# Patient Record
Sex: MALE | Race: WHITE | NOT HISPANIC OR LATINO | Employment: OTHER | URBAN - METROPOLITAN AREA
[De-identification: names, ages, dates, MRNs, and addresses within clinical notes are randomized per-mention and may not be internally consistent; named-entity substitution may affect disease eponyms.]

---

## 2017-02-09 ENCOUNTER — HOSPITAL ENCOUNTER (EMERGENCY)
Facility: HOSPITAL | Age: 37
Discharge: HOME | End: 2017-02-09
Attending: EMERGENCY MEDICINE

## 2017-02-09 VITALS
TEMPERATURE: 98 F | WEIGHT: 165 LBS | BODY MASS INDEX: 25.01 KG/M2 | HEIGHT: 68 IN | HEART RATE: 90 BPM | SYSTOLIC BLOOD PRESSURE: 164 MMHG | RESPIRATION RATE: 18 BRPM | OXYGEN SATURATION: 97 % | DIASTOLIC BLOOD PRESSURE: 94 MMHG

## 2017-02-09 DIAGNOSIS — M79.609 STUMP PAIN: Primary | ICD-10-CM

## 2017-02-09 DIAGNOSIS — T87.89 STUMP PAIN: Primary | ICD-10-CM

## 2017-02-09 PROCEDURE — 99283 EMERGENCY DEPT VISIT LOW MDM: CPT

## 2017-02-09 RX ORDER — HYDROCODONE BITARTRATE AND ACETAMINOPHEN 5; 325 MG/1; MG/1
1 TABLET ORAL EVERY 4 HOURS PRN
Qty: 18 TABLET | Refills: 0 | Status: SHIPPED | OUTPATIENT
Start: 2017-02-09 | End: 2017-09-03

## 2017-02-09 NOTE — ED AVS SNAPSHOT
[facility]  [address]  [place] [address]               [place]   2017  1:22 AM   ED    Description:  Male : 1980   Department:  [facility]           Your Care was Coordinated By:     Provider Role From To    Rashmi Saldaña MD Attending Provider 17 0129 --      Reason for Visit     Neck Pain     Leg Pain           Diagnoses this Visit        Comments    Stump pain    -  Primary       ED Disposition     None           To Do List           Follow-up Information     Follow up with Donald Vega MD In 2 days.    Specialty:  Family Medicine    Why:  For further care    Contact information:    [address]  [place] [address]  [phone]         These Medications        Disp Refills Start End    hydrocodone-acetaminophen 5-325mg (NORCO) 5-325 mg per tablet 18 tablet 0 2017     Take 1 tablet by mouth every 4 (four) hours as needed for Pain. - Oral    Pharmacy: [facility]/pharmacy #[id number] - [place] - [address] [facility] Ph #: [phone]         [facility]     [facility] -  Assistance  Registered nurses in the [facility] provide clinical advisement, health education, appointment booking, and other advisory services.  Call for this free service at [phone].             Medications           Message regarding Medications     Verify the changes and/or additions to your medication regime listed below are the same as discussed with your clinician today.  If any of these changes or additions are incorrect, please notify your healthcare provider.        START taking these NEW medications        Refills    hydrocodone-acetaminophen 5-325mg (NORCO) 5-325 mg per tablet 0    Sig: Take 1 tablet by mouth every 4 (four) hours as needed for Pain.    Class: Print    Route: Oral           Verify that the below list of medications is an accurate  "representation of the medications you are currently taking.  If none reported, the list may be blank. If incorrect, please contact your healthcare provider. Carry this list with you in case of emergency.           Current Medications     alprazolam (XANAX) 0.5 MG tablet Take 0.5 mg by mouth 3 (three) times daily as needed for Anxiety.     duloxetine (CYMBALTA) 60 MG capsule Take 60 mg by mouth once daily.    gabapentin (NEURONTIN) 300 MG capsule Take 2 capsules (600 mg total) by mouth 3 (three) times daily. Take one tablet by mouth 3 to 4 times daily    ibuprofen (ADVIL,MOTRIN) 200 MG tablet Take 2 tablets (400 mg total) by mouth every 6 (six) hours as needed for Pain.    INV LISINOPRIL-HCTZ 20-12.5 Take 1 tablet by mouth once daily. For investigational use only.    acetaminophen (TYLENOL) 500 MG tablet Take 2 tablets (1,000 mg total) by mouth 3 (three) times daily as needed for Pain.    amlodipine (NORVASC) 10 MG tablet Take 1 tablet (10 mg total) by mouth once daily.    hydrocodone-acetaminophen 5-325mg (NORCO) 5-325 mg per tablet Take 1 tablet by mouth every 4 (four) hours as needed for Pain.    naproxen (NAPROSYN) 500 MG tablet Take 500 mg by mouth 2 (two) times daily.    oxycodone-acetaminophen (PERCOCET) 5-325 mg per tablet Take 1 tablet by mouth every 6 (six) hours as needed for Pain.           Clinical Reference Information           Your Vitals Were     BP Pulse Temp Resp Height Weight    161/111 (BP Location: Right arm, Patient Position: Sitting) 94 98 °F (36.7 °C) (Oral) 20 5' 8" (1.727 m) 74.8 kg (165 lb)    SpO2 BMI             100% 25.09 kg/m2         Allergies as of 2/9/2017     No Known Allergies      Immunizations Administered on Date of Encounter - 2/9/2017     None      ED Micro, Lab, POCT     None      ED Imaging Orders     None      Discharge References/Attachments     STUMP WRAPPING, BELOW THE KNEE, DISCHARGE INSTRUCTIONS (ENGLISH)      [facility] Sign-Up     Activating your [facility] account is " as easy as 1-2-3!     1) Visit [email or web address], select Sign Up Now, enter this activation code and your date of birth, then select Next.  [email or web address]  Expires: 3/26/2017  2:12 AM      2) Create a username and password to use when you visit [facility] in the future and select a security question in case you lose your password and select Next.    3) Enter your e-mail address and click Sign Up!    Additional Information  If you have questions, please e-mail [email or web address] or call [phone] to talk to our [employer] staff. Remember, [facility] is NOT to be used for urgent needs. For medical emergencies, dial 911.         Smoking Cessation     If you would like to quit smoking:   You may be eligible for free services if you are a [place] resident and started smoking cigarettes before September 1, 1988.  Call the Smoking Cessation Trust ([facility]) toll free at (599) 999-6689 or ([phone] [phone].   Call 3-074-QUIT-NOW if you do not meet the above criteria.             [facility] complies with applicable Federal civil rights laws and does not discriminate on the basis of race, color, national origin, age, disability, or sex.        Language Assistance Services     ATTENTION: Language assistance services are available, free of charge. Please call [phone].      ATENCIÓN: Si habla español, tiene a aly disposición servicios gratuitos de asistencia lingüística. Llame al [phone].     CHÚ Ý: N?u b?n nói Ti?ng Vi?t, có các d?ch v? h? tr? ngôn ng? mi?n phí dành cho b?n. G?i s? [phone].

## 2017-09-03 ENCOUNTER — HOSPITAL ENCOUNTER (EMERGENCY)
Facility: HOSPITAL | Age: 37
Discharge: HOME | End: 2017-09-03
Attending: FAMILY MEDICINE

## 2017-09-03 VITALS
DIASTOLIC BLOOD PRESSURE: 108 MMHG | TEMPERATURE: 98 F | SYSTOLIC BLOOD PRESSURE: 184 MMHG | HEIGHT: 68 IN | OXYGEN SATURATION: 97 % | WEIGHT: 188 LBS | HEART RATE: 90 BPM | RESPIRATION RATE: 18 BRPM | BODY MASS INDEX: 28.49 KG/M2

## 2017-09-03 DIAGNOSIS — M79.602 ARM PAIN, ANTERIOR, LEFT: ICD-10-CM

## 2017-09-03 DIAGNOSIS — M79.622 LEFT UPPER ARM PAIN: Primary | ICD-10-CM

## 2017-09-03 DIAGNOSIS — S46.912A MUSCLE STRAIN OF LEFT UPPER ARM, INITIAL ENCOUNTER: ICD-10-CM

## 2017-09-03 PROCEDURE — 99283 EMERGENCY DEPT VISIT LOW MDM: CPT

## 2017-09-03 PROCEDURE — 25000003 PHARM REV CODE 250: Performed by: FAMILY MEDICINE

## 2017-09-03 RX ORDER — HYDROCODONE BITARTRATE AND ACETAMINOPHEN 5; 325 MG/1; MG/1
1 TABLET ORAL EVERY 8 HOURS PRN
Qty: 10 TABLET | Refills: 0 | Status: ON HOLD | OUTPATIENT
Start: 2017-09-03 | End: 2020-02-21 | Stop reason: CLARIF

## 2017-09-03 RX ORDER — ATORVASTATIN CALCIUM 40 MG/1
40 TABLET, FILM COATED ORAL DAILY
Status: ON HOLD | COMMUNITY
End: 2020-02-27 | Stop reason: HOSPADM

## 2017-09-03 RX ORDER — KETOROLAC TROMETHAMINE 10 MG/1
10 TABLET, FILM COATED ORAL
Status: COMPLETED | OUTPATIENT
Start: 2017-09-03 | End: 2017-09-03

## 2017-09-03 RX ADMIN — KETOROLAC TROMETHAMINE 10 MG: 10 TABLET, FILM COATED ORAL at 11:09

## 2017-09-04 NOTE — ED NOTES
1st encounter, patient ambulatory to room with steady gait.  C/O left arm pain 7.5/10 after near fall down stairs this afternoon.  Patient states he tripped on stairs and reached out to catch self, stretching left arm; also tripped and fell two weeks ago landing on left arm on carpet.  Since this afternoon patient states he is unable to straighten left arm and feels like bicep muscle is really tight, unable to completely close fist.  (+) distal pulses and cap refill, has scar from laceration to left bicep from fall two weeks ago, states he is not sure what he cut it on but it did not require sutures.  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

## 2017-09-04 NOTE — ED PROVIDER NOTES
Encounter Date: 9/3/2017       History     Chief Complaint   Patient presents with    Arm Pain     Two weeks ago lost balance on crutches and fell on left arm. Tonight dog dropped a ball under foot and he fell. He dropped to grab something with left arm to prevent fall and aggrevated the arm     Patient says he injured his left arm while slipped and fell when his dog ran.  And also he had injury to his left arm today.  Complains of pain in the biceps region.  Patient has a right BKA and uses crutches.  Patient took OTC pain medication at home which did not make him any better.  Patient is able to move his arm but with some pain.  Patient also complains of mild pain in his left shoulder area.  Mild bruising on his skin that nothing deep.  Patient has breath of alcohol and when I asked him if he had any drinks initially denied but in and I told them that I can feel the smells and he admitted to drinking one beer about 4 hours ago but smell looks much stronger than what he says.      The history is provided by the patient.     Review of patient's allergies indicates:  No Known Allergies  Past Medical History:   Diagnosis Date    Amputation of lower limb     left below the knee    Compartment syndrome of left lower extremity     Compartment syndrome of right lower extremity     Depression 3/18/2016    DVT (deep venous thrombosis)     Encounter for cholesteral screening for cardiovascular disease     Hypertension     Tachycardia      Past Surgical History:   Procedure Laterality Date    AMPUTATION  2012    right BKA    KNEE SURGERY      TIBIAL FASCIOTOMY Right 12/08/2015     No family history on file.  Social History   Substance Use Topics    Smoking status: Current Every Day Smoker     Packs/day: 1.00     Types: Cigarettes    Smokeless tobacco: Never Used    Alcohol use Yes      Comment: occas     Review of Systems   Constitutional: Negative for appetite change, chills and fever.   HENT: Negative for  congestion, ear pain and sore throat.    Eyes: Negative for pain, discharge, redness and itching.   Respiratory: Negative for cough, choking, shortness of breath and wheezing.    Cardiovascular: Negative for chest pain, palpitations and leg swelling.   Gastrointestinal: Negative for abdominal distention, abdominal pain and diarrhea.   Genitourinary: Negative for dysuria and frequency.   Musculoskeletal: Negative for back pain, neck pain and neck stiffness.   Skin: Negative for rash.   Neurological: Negative for dizziness, tremors, speech difficulty, weakness, light-headedness and headaches.   Psychiatric/Behavioral: The patient is not nervous/anxious.    All other systems reviewed and are negative.      Physical Exam     Initial Vitals [09/03/17 2107]   BP Pulse Resp Temp SpO2   (!) 188/108 96 18 98.4 °F (36.9 °C) 97 %      MAP       134.67         Physical Exam    Nursing note and vitals reviewed.  Constitutional: He appears well-developed and well-nourished.   HENT:   Head: Normocephalic and atraumatic.   Right Ear: External ear normal.   Left Ear: External ear normal.   Nose: Nose normal.   Mouth/Throat: Oropharynx is clear and moist.   Eyes: Conjunctivae and EOM are normal. Pupils are equal, round, and reactive to light.   Neck: Normal range of motion. Neck supple.   Cardiovascular: Normal rate, regular rhythm, normal heart sounds and intact distal pulses. Exam reveals no friction rub.    No murmur heard.  Pulmonary/Chest: Breath sounds normal. No respiratory distress. He has no wheezes. He has no rhonchi. He has no rales.   Abdominal: Soft. Bowel sounds are normal. He exhibits no distension. There is no tenderness. There is no guarding.   Musculoskeletal: Normal range of motion.        Left shoulder: He exhibits tenderness and pain.        Left elbow: He exhibits normal range of motion. Tenderness found.   Most of the tenderness is localized to the biceps muscle and tendon insertion area.  Mild tenderness in  the left shoulder area.   Neurological: He is alert and oriented to person, place, and time. He has normal reflexes. He displays normal reflexes. No cranial nerve deficit or sensory deficit.   Skin: Skin is warm. Capillary refill takes less than 2 seconds.   Psychiatric: He has a normal mood and affect. Thought content normal.         ED Course   Procedures  Labs Reviewed - No data to display          Medical Decision Making:   Differential Diagnosis:   Biceps tendon strain.  Humerus fracture.  Muscle strain.                   ED Course      Clinical Impression:   The primary encounter diagnosis was Left upper arm pain. Diagnoses of Arm pain, anterior, left and Muscle strain of left upper arm, initial encounter were also pertinent to this visit.    Disposition:   Disposition: Discharged  Condition: Fair  Advised to follow PCP if pain persist.                        Alphonso Zhao MD  09/04/17 0602

## 2017-09-18 ENCOUNTER — HOSPITAL ENCOUNTER (EMERGENCY)
Facility: HOSPITAL | Age: 37
Discharge: HOME | End: 2017-09-18
Attending: EMERGENCY MEDICINE

## 2017-09-18 VITALS
HEART RATE: 88 BPM | HEIGHT: 69 IN | TEMPERATURE: 98 F | WEIGHT: 190 LBS | SYSTOLIC BLOOD PRESSURE: 206 MMHG | RESPIRATION RATE: 17 BRPM | DIASTOLIC BLOOD PRESSURE: 116 MMHG | BODY MASS INDEX: 28.14 KG/M2 | OXYGEN SATURATION: 98 %

## 2017-09-18 DIAGNOSIS — F19.90 MISUSE OF PRESCRIPTION ONLY DRUGS: Primary | ICD-10-CM

## 2017-09-18 DIAGNOSIS — M25.569 KNEE PAIN, ACUTE: ICD-10-CM

## 2017-09-18 PROCEDURE — 99284 EMERGENCY DEPT VISIT MOD MDM: CPT

## 2017-09-18 RX ORDER — TRAMADOL HYDROCHLORIDE 50 MG/1
50 TABLET ORAL
Status: DISCONTINUED | OUTPATIENT
Start: 2017-09-18 | End: 2017-09-18 | Stop reason: HOSPADM

## 2017-09-18 RX ORDER — TRAMADOL HYDROCHLORIDE 50 MG/1
50 TABLET ORAL EVERY 6 HOURS PRN
Qty: 8 TABLET | Refills: 0 | Status: SHIPPED | OUTPATIENT
Start: 2017-09-18 | End: 2018-01-17

## 2018-01-17 ENCOUNTER — HOSPITAL ENCOUNTER (EMERGENCY)
Facility: HOSPITAL | Age: 38
Discharge: HOME | End: 2018-01-17
Attending: FAMILY MEDICINE

## 2018-01-17 VITALS
DIASTOLIC BLOOD PRESSURE: 90 MMHG | HEART RATE: 93 BPM | SYSTOLIC BLOOD PRESSURE: 160 MMHG | HEIGHT: 68 IN | RESPIRATION RATE: 18 BRPM | WEIGHT: 195 LBS | OXYGEN SATURATION: 98 % | BODY MASS INDEX: 29.55 KG/M2 | TEMPERATURE: 98 F

## 2018-01-17 DIAGNOSIS — M25.569 KNEE PAIN: Primary | ICD-10-CM

## 2018-01-17 PROCEDURE — 99283 EMERGENCY DEPT VISIT LOW MDM: CPT | Mod: 25

## 2018-01-17 PROCEDURE — 96372 THER/PROPH/DIAG INJ SC/IM: CPT

## 2018-01-17 PROCEDURE — 63600175 PHARM REV CODE 636 W HCPCS: Performed by: FAMILY MEDICINE

## 2018-01-17 RX ORDER — TRAMADOL HYDROCHLORIDE 50 MG/1
50 TABLET ORAL
Status: DISCONTINUED | OUTPATIENT
Start: 2018-01-17 | End: 2018-01-17 | Stop reason: HOSPADM

## 2018-01-17 RX ORDER — DICLOFENAC SODIUM 50 MG/1
50 TABLET, DELAYED RELEASE ORAL 2 TIMES DAILY
Qty: 21 TABLET | Refills: 0 | Status: ON HOLD | OUTPATIENT
Start: 2018-01-17 | End: 2020-02-21 | Stop reason: CLARIF

## 2018-01-17 RX ORDER — TRAMADOL HYDROCHLORIDE 50 MG/1
50 TABLET ORAL EVERY 6 HOURS PRN
Qty: 8 TABLET | Refills: 0 | Status: ON HOLD | OUTPATIENT
Start: 2018-01-17 | End: 2020-02-21 | Stop reason: CLARIF

## 2018-01-17 RX ORDER — KETOROLAC TROMETHAMINE 30 MG/ML
60 INJECTION, SOLUTION INTRAMUSCULAR; INTRAVENOUS
Status: COMPLETED | OUTPATIENT
Start: 2018-01-17 | End: 2018-01-17

## 2018-01-17 RX ADMIN — KETOROLAC TROMETHAMINE 60 MG: 30 INJECTION, SOLUTION INTRAMUSCULAR at 12:01

## 2018-01-17 NOTE — ED PROVIDER NOTES
Encounter Date: 1/17/2018       History     Chief Complaint   Patient presents with    Leg Pain     c/o R leg/stump pain s/p slip and fall tonight; denies LOC or hitting head     37-year-old male patient comes in with complaint of a recent fall injuring his right stump and right knee.  Patient states that he had a prosthesis on slipped on the ice and twisted his knee behind his leg.  Otherwise patient has no obvious injury no swelling no redness or bruises.  Upon reviewing patient's chart it was noted to patient's last visit that he was at risk for overuse of narcotics for lying about receiving multiple prescriptions from multiple doctors at several hospitals for oxycodone and pain medicines.          Review of patient's allergies indicates:  No Known Allergies  Past Medical History:   Diagnosis Date    Amputation of lower limb     left below the knee    Compartment syndrome of left lower extremity     Compartment syndrome of right lower extremity     Depression 3/18/2016    DVT (deep venous thrombosis)     Encounter for cholesteral screening for cardiovascular disease     Hypertension     Tachycardia      Past Surgical History:   Procedure Laterality Date    AMPUTATION  2012    right BKA    KNEE SURGERY      TIBIAL FASCIOTOMY Right 12/08/2015     History reviewed. No pertinent family history.  Social History   Substance Use Topics    Smoking status: Current Every Day Smoker     Packs/day: 1.00     Types: Cigarettes    Smokeless tobacco: Never Used    Alcohol use Yes      Comment: occas     Review of Systems   Constitutional: Negative for fever.   HENT: Negative for sore throat.    Respiratory: Negative for shortness of breath.    Cardiovascular: Negative for chest pain.   Gastrointestinal: Negative for nausea.   Genitourinary: Negative for dysuria.   Musculoskeletal: Positive for arthralgias. Negative for back pain.   Skin: Negative for rash.   Neurological: Negative for weakness.   Hematological:  Does not bruise/bleed easily.   All other systems reviewed and are negative.      Physical Exam     Initial Vitals [01/17/18 0039]   BP Pulse Resp Temp SpO2   (!) 188/99 95 18 97.9 °F (36.6 °C) 97 %      MAP       128.67         Physical Exam    Nursing note and vitals reviewed.  Constitutional: He appears well-developed and well-nourished.   HENT:   Head: Normocephalic and atraumatic.   Eyes: Conjunctivae and EOM are normal. Pupils are equal, round, and reactive to light.   Neck: Normal range of motion. Neck supple.   Cardiovascular: Normal rate, regular rhythm and normal heart sounds.   Pulmonary/Chest: Breath sounds normal.   Abdominal: Soft. Bowel sounds are normal.   Musculoskeletal: He exhibits tenderness.   Patient has no prosthetic on his below-the-knee amputation of his right leg complains of right knee pain based on a fall where he twisted his leg patient has stability of the medial collateral lateral collateral ligament also has stability of the anterior cruciate ligament and PCL.  Patient has no joint effusion.  Does complain of pain with movement and palpation although patient has full stability to the knee.  No obvious signs of injury   Neurological: He is alert. He has normal reflexes.         ED Course   Procedures  Labs Reviewed - No data to display          Medical Decision Making:   Initial Assessment:   Patient in moderate distress and no other complains    Differential Diagnosis:   Fracture  Edema  Sprain   strain    ED Management:  Even before I finished examining the patient the patient is comfortable and talking but he did ask for pain medicines 3 times during my initial encounter with the patient.  By the time I was finishing examining the patient the patient asked for pain medicines very aggressively but otherwise did not appear to be in pain while he was talking and relaying his history of falls.                   ED Course      Clinical Impression:   The encounter diagnosis was Knee  pain.                           Krishna Lerma MD  01/17/18 0124

## 2019-02-04 ENCOUNTER — HOSPITAL ENCOUNTER (INPATIENT)
Facility: HOSPITAL | Age: 39
LOS: 10 days | Discharge: HOME | DRG: 565 | End: 2019-02-15
Attending: EMERGENCY MEDICINE | Admitting: HOSPITALIST
Payer: MEDICARE

## 2019-02-04 DIAGNOSIS — Z72.0 TOBACCO ABUSE: Chronic | ICD-10-CM

## 2019-02-04 DIAGNOSIS — Z79.01 WARFARIN ANTICOAGULATION: ICD-10-CM

## 2019-02-04 DIAGNOSIS — I99.9 VASCULAR COMPLICATION: Primary | ICD-10-CM

## 2019-02-04 DIAGNOSIS — F41.9 ANXIETY AND DEPRESSION: Chronic | ICD-10-CM

## 2019-02-04 DIAGNOSIS — F32.A ANXIETY AND DEPRESSION: Chronic | ICD-10-CM

## 2019-02-04 DIAGNOSIS — E87.1 HYPONATREMIA: ICD-10-CM

## 2019-02-04 DIAGNOSIS — M79.661 PAIN IN RIGHT LOWER LEG: ICD-10-CM

## 2019-02-04 DIAGNOSIS — E78.2 MIXED HYPERLIPIDEMIA: Chronic | ICD-10-CM

## 2019-02-04 DIAGNOSIS — T87.9 BKA STUMP COMPLICATION: ICD-10-CM

## 2019-02-04 DIAGNOSIS — R58 ECCHYMOSIS: ICD-10-CM

## 2019-02-04 DIAGNOSIS — I10 ESSENTIAL HYPERTENSION: Chronic | ICD-10-CM

## 2019-02-04 LAB
ALBUMIN SERPL BCP-MCNC: 4 G/DL
ALP SERPL-CCNC: 97 U/L
ALT SERPL W/O P-5'-P-CCNC: 10 U/L
ANION GAP SERPL CALC-SCNC: 9 MMOL/L
AST SERPL-CCNC: 21 U/L
BASOPHILS # BLD AUTO: 0.07 K/UL
BASOPHILS NFR BLD: 0.6 %
BILIRUB SERPL-MCNC: 0.4 MG/DL
BUN SERPL-MCNC: 12 MG/DL
CALCIUM SERPL-MCNC: 9.9 MG/DL
CHLORIDE SERPL-SCNC: 108 MMOL/L
CK SERPL-CCNC: 411 U/L
CO2 SERPL-SCNC: 23 MMOL/L
CREAT SERPL-MCNC: 0.7 MG/DL
CRP SERPL-MCNC: 8.7 MG/L
DIFFERENTIAL METHOD: NORMAL
EOSINOPHIL # BLD AUTO: 0.1 K/UL
EOSINOPHIL NFR BLD: 1.1 %
ERYTHROCYTE [DISTWIDTH] IN BLOOD BY AUTOMATED COUNT: 13.1 %
ERYTHROCYTE [SEDIMENTATION RATE] IN BLOOD BY WESTERGREN METHOD: 43 MM/HR
EST. GFR  (AFRICAN AMERICAN): >60 ML/MIN/1.73 M^2
EST. GFR  (NON AFRICAN AMERICAN): >60 ML/MIN/1.73 M^2
GLUCOSE SERPL-MCNC: 89 MG/DL
HCT VFR BLD AUTO: 46.6 %
HGB BLD-MCNC: 16.1 G/DL
IMM GRANULOCYTES # BLD AUTO: 0.03 K/UL
IMM GRANULOCYTES NFR BLD AUTO: 0.3 %
LACTATE SERPL-SCNC: 0.8 MMOL/L
LYMPHOCYTES # BLD AUTO: 3.5 K/UL
LYMPHOCYTES NFR BLD: 29.6 %
MCH RBC QN AUTO: 30.9 PG
MCHC RBC AUTO-ENTMCNC: 34.5 G/DL
MCV RBC AUTO: 89 FL
MONOCYTES # BLD AUTO: 0.7 K/UL
MONOCYTES NFR BLD: 5.5 %
NEUTROPHILS # BLD AUTO: 7.5 K/UL
NEUTROPHILS NFR BLD: 62.9 %
NRBC BLD-RTO: 0 /100 WBC
PLATELET # BLD AUTO: 268 K/UL
PMV BLD AUTO: 10.4 FL
POTASSIUM SERPL-SCNC: 3.7 MMOL/L
PROT SERPL-MCNC: 7.9 G/DL
RBC # BLD AUTO: 5.21 M/UL
SODIUM SERPL-SCNC: 140 MMOL/L
WBC # BLD AUTO: 11.91 K/UL

## 2019-02-04 PROCEDURE — G0378 HOSPITAL OBSERVATION PER HR: HCPCS

## 2019-02-04 PROCEDURE — 82550 ASSAY OF CK (CPK): CPT

## 2019-02-04 PROCEDURE — 99285 EMERGENCY DEPT VISIT HI MDM: CPT

## 2019-02-04 PROCEDURE — 85025 COMPLETE CBC W/AUTO DIFF WBC: CPT

## 2019-02-04 PROCEDURE — 25500020 PHARM REV CODE 255: Performed by: EMERGENCY MEDICINE

## 2019-02-04 PROCEDURE — 99285 PR EMERGENCY DEPT VISIT,LEVEL V: ICD-10-PCS | Mod: ,,, | Performed by: EMERGENCY MEDICINE

## 2019-02-04 PROCEDURE — 99285 EMERGENCY DEPT VISIT HI MDM: CPT | Mod: ,,, | Performed by: EMERGENCY MEDICINE

## 2019-02-04 PROCEDURE — 99223 PR INITIAL HOSPITAL CARE,LEVL III: ICD-10-PCS | Mod: ,,, | Performed by: PHYSICIAN ASSISTANT

## 2019-02-04 PROCEDURE — 85652 RBC SED RATE AUTOMATED: CPT

## 2019-02-04 PROCEDURE — 99223 1ST HOSP IP/OBS HIGH 75: CPT | Mod: ,,, | Performed by: PHYSICIAN ASSISTANT

## 2019-02-04 PROCEDURE — 96374 THER/PROPH/DIAG INJ IV PUSH: CPT

## 2019-02-04 PROCEDURE — 83605 ASSAY OF LACTIC ACID: CPT

## 2019-02-04 PROCEDURE — 86140 C-REACTIVE PROTEIN: CPT

## 2019-02-04 PROCEDURE — 25000003 PHARM REV CODE 250: Performed by: PHYSICIAN ASSISTANT

## 2019-02-04 PROCEDURE — 80053 COMPREHEN METABOLIC PANEL: CPT

## 2019-02-04 PROCEDURE — 63600175 PHARM REV CODE 636 W HCPCS: Performed by: PHYSICIAN ASSISTANT

## 2019-02-04 RX ORDER — ONDANSETRON 2 MG/ML
4 INJECTION INTRAMUSCULAR; INTRAVENOUS EVERY 8 HOURS PRN
Status: DISCONTINUED | OUTPATIENT
Start: 2019-02-04 | End: 2019-02-05

## 2019-02-04 RX ORDER — SODIUM CHLORIDE 0.9 % (FLUSH) 0.9 %
5 SYRINGE (ML) INJECTION
Status: DISCONTINUED | OUTPATIENT
Start: 2019-02-04 | End: 2019-02-15 | Stop reason: HOSPADM

## 2019-02-04 RX ORDER — ACETAMINOPHEN 325 MG/1
650 TABLET ORAL EVERY 8 HOURS PRN
Status: DISCONTINUED | OUTPATIENT
Start: 2019-02-04 | End: 2019-02-05

## 2019-02-04 RX ORDER — HYDROCODONE BITARTRATE AND ACETAMINOPHEN 10; 325 MG/1; MG/1
1 TABLET ORAL
Status: COMPLETED | OUTPATIENT
Start: 2019-02-04 | End: 2019-02-04

## 2019-02-04 RX ORDER — HYDROCODONE BITARTRATE AND ACETAMINOPHEN 10; 325 MG/1; MG/1
1 TABLET ORAL EVERY 4 HOURS PRN
Status: DISCONTINUED | OUTPATIENT
Start: 2019-02-04 | End: 2019-02-05

## 2019-02-04 RX ORDER — KETOROLAC TROMETHAMINE 30 MG/ML
10 INJECTION, SOLUTION INTRAMUSCULAR; INTRAVENOUS
Status: COMPLETED | OUTPATIENT
Start: 2019-02-04 | End: 2019-02-04

## 2019-02-04 RX ADMIN — HYDROCODONE BITARTRATE AND ACETAMINOPHEN 1 TABLET: 10; 325 TABLET ORAL at 07:02

## 2019-02-04 RX ADMIN — KETOROLAC TROMETHAMINE 10 MG: 30 INJECTION, SOLUTION INTRAMUSCULAR at 06:02

## 2019-02-04 RX ADMIN — IOHEXOL 100 ML: 350 INJECTION, SOLUTION INTRAVENOUS at 07:02

## 2019-02-04 NOTE — ED NOTES
.  Patient identifiers for Ren Grove 38 y.o. male checked and correct.  Chief Complaint   Patient presents with    Leg Pain     bka stump cold to touch, had blood clot that caused amputation     Past Medical History:   Diagnosis Date    Amputation of lower limb     left below the knee    Compartment syndrome of left lower extremity     Compartment syndrome of right lower extremity     Depression 3/18/2016    DVT (deep venous thrombosis)     Encounter for cholesteral screening for cardiovascular disease     Hypertension     Tachycardia      Allergies reported: Review of patient's allergies indicates:  No Known Allergies      LOC: Patient is awake, alert, and aware of environment with an appropriate affect. Patient is oriented x 3 and speaking appropriately.  APPEARANCE: Patient resting comfortably and in no acute distress. Patient is clean and well groomed, patient's clothing is properly fastened.  SKIN: The skin is warm and dry. Patient has normal skin turgor and moist mucus membranes. Skin is intact; pt right leg below the amputation from 2012, cold and purple color. Pt reports leg is painful, symptoms started last night. Popliteal pulses present, femoral pulse present.   MUSKULOSKELETAL: Patient is moving all extremities well, no obvious deformities noted. Pulses intact.   RESPIRATORY: Airway is open and patent. Respirations are spontaneous and non-labored with normal effort and rate, BBS=clear  CARDIAC: Patient has a normal rate and rhythm.ABDOMEN: No distention noted. Bowel sounds active in all 4 quadrants. Soft and non-tender upon palpation.  NEUROLOGICAL:  PERRL. Facial expression is symmetrical. Hand grasps are equal bilaterally. Normal sensation in all extremities when touched with finger.

## 2019-02-04 NOTE — ED PROVIDER NOTES
Encounter Date: 2/4/2019       History     Chief Complaint   Patient presents with    Leg Pain     bka stump cold to touch, had blood clot that caused amputation     The patient, who has a past medical history significant for DVT, compartment syndrome, and right BKA, presents to the ER for an emergent evaluation due to acute pain of his right stump since last night. He states that the stump feels cold. He states that he noticed skin color change to the area of the leg that is in contact with his prosthetic, which he describes as purple. He describes the pain degree as moderate. He states that the pain course is constant. He states that the pain is worse with palpation. He denies any mitigating factors. He denies any pre-arrival treatment.            Review of patient's allergies indicates:  No Known Allergies  Past Medical History:   Diagnosis Date    Amputation of lower limb     left below the knee    Compartment syndrome of left lower extremity     Compartment syndrome of right lower extremity     Depression 3/18/2016    DVT (deep venous thrombosis)     Encounter for cholesteral screening for cardiovascular disease     Hypertension     Tachycardia      Past Surgical History:   Procedure Laterality Date     Fasciotomy closure Left 12/11/2015    Performed by Kaylan Valencia MD at [facility] OR [address] [place] [address]    AMPUTATION, LOWER LIMB  2012    right BKA    FASCIOTOMY-COMPARTMENT Left 12/9/2015    [facility]    INCISION-DRAINAGE-HEMATOMA Left 12/30/2015    [facility]    KNEE SURGERY      TIBIAL FASCIOTOMY Right 12/08/2015    VENOGRAM Left 3/3/2016    Performed by Filemon Goddard MD at [facility] CATH LAB     History reviewed. No pertinent family history.  Social History     Tobacco Use    Smoking status: Current Every Day Smoker     Packs/day: 1.00     Types: Cigarettes    Smokeless tobacco: Never Used   Substance Use Topics     Alcohol use: Yes     Comment: occas    Drug use: No     Review of Systems   Constitutional: Negative for chills and fever.   Respiratory: Negative for chest tightness and shortness of breath.    Cardiovascular: Negative for chest pain and leg swelling.   Gastrointestinal: Negative for abdominal pain, diarrhea, nausea and vomiting.   Genitourinary: Negative for decreased urine volume.   Musculoskeletal: Negative for arthralgias, back pain and joint swelling.   Skin: Positive for color change. Negative for wound.   Neurological: Negative for dizziness, syncope, weakness, light-headedness, numbness and headaches.   Psychiatric/Behavioral: Negative for confusion.       Physical Exam     Initial Vitals [02/04/19 1510]   BP Pulse Resp Temp SpO2   (!) 176/99 108 18 97.8 °F (36.6 °C) 97 %      MAP       --         Physical Exam    Nursing note and vitals reviewed.  Constitutional: He appears well-developed and well-nourished. He is not diaphoretic.   HENT:   Head: Normocephalic.   Eyes: Conjunctivae are normal.   Cardiovascular: Normal rate.   Palpable right femoral and popliteal pulses. Right distal stump is cool to touch.    Pulmonary/Chest: No respiratory distress.   Abdominal: Soft. He exhibits no distension. There is no tenderness. There is no guarding.   Musculoskeletal:   S/P right BKA; pain to palpation of right stump; skin intact; confluent ecchymosis noted; no erythema or heat; stump significantly cool to touch compared to contralateral leg; compartments soft and compressible; normal ROM to right knee.    Neurological: He is alert and oriented to person, place, and time. He has normal strength. No sensory deficit.   Skin: Skin is warm and dry. No rash noted. No erythema.   Psychiatric: He has a normal mood and affect. His behavior is normal.         ED Course   Procedures  Labs Reviewed   CK - Abnormal; Notable for the following components:       Result Value     (*)     All other components within normal  limits    Narrative:     shared lavender   SEDIMENTATION RATE - Abnormal; Notable for the following components:    Sed Rate 43 (*)     All other components within normal limits    Narrative:     shared lavender   C-REACTIVE PROTEIN - Abnormal; Notable for the following components:    CRP 8.7 (*)     All other components within normal limits    Narrative:     shared lavender   CBC W/ AUTO DIFFERENTIAL    Narrative:     shared lavender   COMPREHENSIVE METABOLIC PANEL    Narrative:     shared lavender   LACTIC ACID, PLASMA    Narrative:     shared lavender     Results for orders placed or performed during the hospital encounter of 02/04/19   CBC auto differential   Result Value Ref Range    WBC 11.91 3.90 - 12.70 K/uL    RBC 5.21 4.60 - 6.20 M/uL    Hemoglobin 16.1 14.0 - 18.0 g/dL    Hematocrit 46.6 40.0 - 54.0 %    MCV 89 82 - 98 fL    MCH 30.9 27.0 - 31.0 pg    MCHC 34.5 32.0 - 36.0 g/dL    RDW 13.1 11.5 - 14.5 %    Platelets 268 150 - 350 K/uL    MPV 10.4 9.2 - 12.9 fL    Immature Granulocytes 0.3 0.0 - 0.5 %    Gran # (ANC) 7.5 1.8 - 7.7 K/uL    Immature Grans (Abs) 0.03 0.00 - 0.04 K/uL    Lymph # 3.5 1.0 - 4.8 K/uL    Mono # 0.7 0.3 - 1.0 K/uL    Eos # 0.1 0.0 - 0.5 K/uL    Baso # 0.07 0.00 - 0.20 K/uL    nRBC 0 0 /100 WBC    Gran% 62.9 38.0 - 73.0 %    Lymph% 29.6 18.0 - 48.0 %    Mono% 5.5 4.0 - 15.0 %    Eosinophil% 1.1 0.0 - 8.0 %    Basophil% 0.6 0.0 - 1.9 %    Differential Method Automated    Comprehensive metabolic panel   Result Value Ref Range    Sodium 140 136 - 145 mmol/L    Potassium 3.7 3.5 - 5.1 mmol/L    Chloride 108 95 - 110 mmol/L    CO2 23 23 - 29 mmol/L    Glucose 89 70 - 110 mg/dL    BUN, Bld 12 6 - 20 mg/dL    Creatinine 0.7 0.5 - 1.4 mg/dL    Calcium 9.9 8.7 - 10.5 mg/dL    Total Protein 7.9 6.0 - 8.4 g/dL    Albumin 4.0 3.5 - 5.2 g/dL    Total Bilirubin 0.4 0.1 - 1.0 mg/dL    Alkaline Phosphatase 97 55 - 135 U/L    AST 21 10 - 40 U/L    ALT 10 10 - 44 U/L    Anion Gap 9 8 - 16 mmol/L     eGFR if African American >60.0 >60 mL/min/1.73 m^2    eGFR if non African American >60.0 >60 mL/min/1.73 m^2   CPK   Result Value Ref Range     (H) 20 - 200 U/L   Lactic acid, plasma   Result Value Ref Range    Lactate (Lactic Acid) 0.8 0.5 - 2.2 mmol/L   Sedimentation rate   Result Value Ref Range    Sed Rate 43 (H) 0 - 23 mm/Hr   C-reactive protein   Result Value Ref Range    CRP 8.7 (H) 0.0 - 8.2 mg/L            Imaging Results          CTA Lower Extremity (In process)                US Lower Extremity Veins Right (Final result)  Result time 02/04/19 17:35:45    Final result by Eddie Giraldo MD (02/04/19 17:35:45)                 Impression:      No convincing evidence of deep venous thrombosis in the right lower extremity, noting patient is status post right-sided below-the-knee amputation.      Electronically signed by: Eddie Giraldo MD  Date:    02/04/2019  Time:    17:35             Narrative:    EXAMINATION:  US LOWER EXTREMITY VEINS RIGHT    CLINICAL HISTORY:  Pain in right lower leg    TECHNIQUE:  Duplex and color flow Doppler evaluation and graded compression of the right lower extremity veins was performed.    COMPARISON:  Bilateral lower extremity venous upper ultrasound 02/12/2016 and right lower extremity venous upper ultrasound 09/18/2017    FINDINGS:  Right thigh veins: The common femoral, femoral, popliteal, upper greater saphenous, and deep femoral veins are patent and free of thrombus.  Popliteal vein is compressible; however demonstrates slow flow but grossly similar to previous exam of 09/18/2017.  The remaining veins are normally compressible and have normal phasic flow and augmentation response.    Patient is status post right sided below the knee amputation.    Contralateral CFV: The contralateral (left) common femoral vein is patent and free of thrombus.    Miscellaneous: None                               US Lower Extremity Arteries Right (Final result)  Result time 02/04/19  17:49:09    Final result by Eddie Giraldo MD (02/04/19 17:49:09)                 Impression:      1.  No evidence of hemodynamically significant stenosis in the imaged right lower extremity, noting patient is status post right-sided below-the-knee amputation.      Electronically signed by: Eddie Giraldo MD  Date:    02/04/2019  Time:    17:49             Narrative:    EXAMINATION:  US LOWER EXTREMITY ARTERIES RIGHT    CLINICAL HISTORY:  Pain in leg, unspecified    TECHNIQUE:  Spectral, color and grayscale images of the large arteries of the right lower extremity was performed.    COMPARISON:  Right lower extremity venous upper ultrasound same day and 09/18/2017    FINDINGS:  Triphasic and biphasic waveforms are noted throughout the right lower extremity, noting patient is status post right-sided below the knee amputation.    Peak systolic velocities are as follows:    Right:    Common femoral artery: 113 centimeters/second    Superficial femoral artery proximal: 79centimeters/seconds    Superficial femoral artery mid: 83centimeters/seconds    Superficial femoral artery distal: 45centimeters/seconds    Popliteal artery: 8 centimeters/seconds                                 Medical Decision Making:   History:   Old Medical Records: I decided to obtain old medical records.  Initial Assessment:   Pt here for an emergent evaluation due to right lower extremity stump c/o acute pain, ecchymosis, and cold to touch since last night.   Differential Diagnosis:   Arterial occlusion, DVT, Infection, Ischemia, Compartment syndrome, phantom pain, Rash, etc   Clinical Tests:   Radiological Study: Ordered and Reviewed  ED Management:  I discussed the case in detail with the ER attending physician who also examined the patient. She requested that we notify vascular surgery promptly. Stat US ordered.   Vascular returned page, case discussed in detail, no further testing or imaging requested, states that he will evaluate the patient  in the ER.  Vascular evaluated the patient, requested CTA of RLE   I discussed with  - obs requested   Will discusse with hospital medicine to request admission for further evaluation and treatment.      Additional MDM:     Well's Criteria Score:  -Clinical symptoms of DVT (leg swelling, pain with palpation) = 3.0  -Other diagnosis less likely than pulmonary embolism =            0.0  -Heart Rate >100 =   1.5  -Immobilization (= or > than 3 days) or surgery in the previous 4 weeks = 0.0  -Previous DVT/PE = 1.5  -Hemoptysis =          0.0  -Malignancy =           0.0  Well's Probability Score =    6                 Attending Attestation:     Physician Attestation Statement for NP/PA:   I have conducted a face to face encounter with this patient in addition to the NP/PA, due to Medical Complexity    Other NP/PA Attestation Additions:    History of Present Illness: Pt with hx of vascular dz with previous unprovoked dvt and compartment syndrome with bka hit right leg presents to the ED with leg pain and darkening of his leg in the area of his prostetic sleeve. The leg is cool to the touch. No recent trauma, f/c, n/v/d    Physical Exam: Right stump is cool to the touch, and darkened in the area where sleeve the sleeve was with a strong line of demarcation in color and warmth, palpable pulse, thigh compartment is soft    Medical Decision Making: Pt with prevous vascular occlusive dz will eval for recurrence, sono and pt to be seen by vascular.    9:33 PM  Pt being admitted to medicine for continued eval and close monitoring of leg. No occulusion seen at this time.                     Clinical Impression:   The primary encounter diagnosis was Vascular complication. Diagnoses of Pain in right lower leg, BKA stump complication, and Ecchymosis were also pertinent to this visit.      Disposition:   Disposition: Admitted  Condition: Stable                        Stephon Guadarrama PA-C  02/04/19 0468        Rufina Freed MD  02/04/19 [id number]       Rufina Freed MD  02/04/19 2873

## 2019-02-04 NOTE — ED TRIAGE NOTES
Pt presents with c/o pain to right leg, and cold to the touch, redness to right leg. Pt has below knee amputation.  Symptoms started last night.

## 2019-02-05 PROBLEM — Z72.0 TOBACCO ABUSE: Chronic | Status: ACTIVE | Noted: 2019-02-04

## 2019-02-05 LAB
ALBUMIN SERPL BCP-MCNC: 3.7 G/DL
ALP SERPL-CCNC: 78 U/L
ALT SERPL W/O P-5'-P-CCNC: 11 U/L
ANION GAP SERPL CALC-SCNC: 9 MMOL/L
APTT BLDCRRT: 30.2 SEC
APTT BLDCRRT: 31.1 SEC
APTT BLDCRRT: 75.9 SEC
APTT BLDCRRT: 85.8 SEC
AST SERPL-CCNC: 20 U/L
BASOPHILS # BLD AUTO: 0.05 K/UL
BASOPHILS # BLD AUTO: 0.08 K/UL
BASOPHILS NFR BLD: 0.6 %
BASOPHILS NFR BLD: 0.8 %
BILIRUB SERPL-MCNC: 0.7 MG/DL
BUN SERPL-MCNC: 12 MG/DL
CALCIUM SERPL-MCNC: 9.4 MG/DL
CHLORIDE SERPL-SCNC: 105 MMOL/L
CO2 SERPL-SCNC: 25 MMOL/L
CREAT SERPL-MCNC: 0.8 MG/DL
DIFFERENTIAL METHOD: ABNORMAL
DIFFERENTIAL METHOD: NORMAL
EOSINOPHIL # BLD AUTO: 0.2 K/UL
EOSINOPHIL # BLD AUTO: 0.3 K/UL
EOSINOPHIL NFR BLD: 2.6 %
EOSINOPHIL NFR BLD: 2.8 %
ERYTHROCYTE [DISTWIDTH] IN BLOOD BY AUTOMATED COUNT: 13.2 %
ERYTHROCYTE [DISTWIDTH] IN BLOOD BY AUTOMATED COUNT: 13.2 %
EST. GFR  (AFRICAN AMERICAN): >60 ML/MIN/1.73 M^2
EST. GFR  (NON AFRICAN AMERICAN): >60 ML/MIN/1.73 M^2
ESTIMATED AVG GLUCOSE: 100 MG/DL
GLUCOSE SERPL-MCNC: 88 MG/DL
HBA1C MFR BLD HPLC: 5.1 %
HCT VFR BLD AUTO: 34.4 %
HCT VFR BLD AUTO: 45.5 %
HGB BLD-MCNC: 11.4 G/DL
HGB BLD-MCNC: 15.1 G/DL
IMM GRANULOCYTES # BLD AUTO: 0.01 K/UL
IMM GRANULOCYTES # BLD AUTO: 0.01 K/UL
IMM GRANULOCYTES NFR BLD AUTO: 0.1 %
IMM GRANULOCYTES NFR BLD AUTO: 0.1 %
INR PPP: 0.9
LYMPHOCYTES # BLD AUTO: 3.1 K/UL
LYMPHOCYTES # BLD AUTO: 4 K/UL
LYMPHOCYTES NFR BLD: 38.5 %
LYMPHOCYTES NFR BLD: 41.7 %
MAGNESIUM SERPL-MCNC: 2.3 MG/DL
MCH RBC QN AUTO: 30.4 PG
MCH RBC QN AUTO: 30.6 PG
MCHC RBC AUTO-ENTMCNC: 33.1 G/DL
MCHC RBC AUTO-ENTMCNC: 33.2 G/DL
MCV RBC AUTO: 92 FL
MCV RBC AUTO: 93 FL
MONOCYTES # BLD AUTO: 0.6 K/UL
MONOCYTES # BLD AUTO: 0.6 K/UL
MONOCYTES NFR BLD: 6.4 %
MONOCYTES NFR BLD: 6.9 %
NEUTROPHILS # BLD AUTO: 4.1 K/UL
NEUTROPHILS # BLD AUTO: 4.6 K/UL
NEUTROPHILS NFR BLD: 48.2 %
NEUTROPHILS NFR BLD: 51.3 %
NRBC BLD-RTO: 0 /100 WBC
NRBC BLD-RTO: 0 /100 WBC
PHOSPHATE SERPL-MCNC: 2.8 MG/DL
PLATELET # BLD AUTO: 193 K/UL
PLATELET # BLD AUTO: 265 K/UL
PMV BLD AUTO: 10.4 FL
PMV BLD AUTO: 11.1 FL
POTASSIUM SERPL-SCNC: 3.6 MMOL/L
PROT SERPL-MCNC: 7.1 G/DL
PROTHROMBIN TIME: 10 SEC
RBC # BLD AUTO: 3.72 M/UL
RBC # BLD AUTO: 4.97 M/UL
SODIUM SERPL-SCNC: 139 MMOL/L
WBC # BLD AUTO: 8 K/UL
WBC # BLD AUTO: 9.48 K/UL

## 2019-02-05 PROCEDURE — 84100 ASSAY OF PHOSPHORUS: CPT

## 2019-02-05 PROCEDURE — 99406 PR TOBACCO USE CESSATION INTERMEDIATE 3-10 MINUTES: ICD-10-PCS | Mod: ,,, | Performed by: INTERNAL MEDICINE

## 2019-02-05 PROCEDURE — 83735 ASSAY OF MAGNESIUM: CPT

## 2019-02-05 PROCEDURE — 99232 PR SUBSEQUENT HOSPITAL CARE,LEVL II: ICD-10-PCS | Mod: ,,, | Performed by: INTERNAL MEDICINE

## 2019-02-05 PROCEDURE — 83036 HEMOGLOBIN GLYCOSYLATED A1C: CPT

## 2019-02-05 PROCEDURE — 85730 THROMBOPLASTIN TIME PARTIAL: CPT | Mod: 91

## 2019-02-05 PROCEDURE — 25000003 PHARM REV CODE 250: Performed by: PHYSICIAN ASSISTANT

## 2019-02-05 PROCEDURE — 85730 THROMBOPLASTIN TIME PARTIAL: CPT

## 2019-02-05 PROCEDURE — 63600175 PHARM REV CODE 636 W HCPCS: Performed by: PHYSICIAN ASSISTANT

## 2019-02-05 PROCEDURE — 99223 PR INITIAL HOSPITAL CARE,LEVL III: ICD-10-PCS | Mod: ,,, | Performed by: SURGERY

## 2019-02-05 PROCEDURE — 85610 PROTHROMBIN TIME: CPT

## 2019-02-05 PROCEDURE — 80053 COMPREHEN METABOLIC PANEL: CPT

## 2019-02-05 PROCEDURE — 99223 1ST HOSP IP/OBS HIGH 75: CPT | Mod: ,,, | Performed by: SURGERY

## 2019-02-05 PROCEDURE — 36415 COLL VENOUS BLD VENIPUNCTURE: CPT

## 2019-02-05 PROCEDURE — 85025 COMPLETE CBC W/AUTO DIFF WBC: CPT

## 2019-02-05 PROCEDURE — 11000001 HC ACUTE MED/SURG PRIVATE ROOM

## 2019-02-05 PROCEDURE — 99406 BEHAV CHNG SMOKING 3-10 MIN: CPT | Mod: ,,, | Performed by: INTERNAL MEDICINE

## 2019-02-05 PROCEDURE — 99232 SBSQ HOSP IP/OBS MODERATE 35: CPT | Mod: ,,, | Performed by: INTERNAL MEDICINE

## 2019-02-05 PROCEDURE — S4991 NICOTINE PATCH NONLEGEND: HCPCS | Performed by: PHYSICIAN ASSISTANT

## 2019-02-05 RX ORDER — ACETAMINOPHEN 325 MG/1
650 TABLET ORAL EVERY 4 HOURS PRN
Status: DISCONTINUED | OUTPATIENT
Start: 2019-02-05 | End: 2019-02-15 | Stop reason: HOSPADM

## 2019-02-05 RX ORDER — ALPRAZOLAM 0.25 MG/1
0.5 TABLET ORAL 3 TIMES DAILY PRN
Status: DISCONTINUED | OUTPATIENT
Start: 2019-02-05 | End: 2019-02-15 | Stop reason: HOSPADM

## 2019-02-05 RX ORDER — SODIUM CHLORIDE 0.9 % (FLUSH) 0.9 %
5 SYRINGE (ML) INJECTION
Status: DISCONTINUED | OUTPATIENT
Start: 2019-02-05 | End: 2019-02-15 | Stop reason: HOSPADM

## 2019-02-05 RX ORDER — ONDANSETRON 8 MG/1
8 TABLET, ORALLY DISINTEGRATING ORAL EVERY 8 HOURS PRN
Status: DISCONTINUED | OUTPATIENT
Start: 2019-02-05 | End: 2019-02-15 | Stop reason: HOSPADM

## 2019-02-05 RX ORDER — IBUPROFEN 200 MG
1 TABLET ORAL DAILY
Status: DISCONTINUED | OUTPATIENT
Start: 2019-02-05 | End: 2019-02-06

## 2019-02-05 RX ORDER — RAMELTEON 8 MG/1
8 TABLET ORAL NIGHTLY PRN
Status: DISCONTINUED | OUTPATIENT
Start: 2019-02-05 | End: 2019-02-15 | Stop reason: HOSPADM

## 2019-02-05 RX ORDER — HEPARIN SODIUM,PORCINE/D5W 25000/250
18 INTRAVENOUS SOLUTION INTRAVENOUS CONTINUOUS
Status: DISCONTINUED | OUTPATIENT
Start: 2019-02-05 | End: 2019-02-05

## 2019-02-05 RX ORDER — HYDROCHLOROTHIAZIDE 12.5 MG/1
12.5 TABLET ORAL DAILY
Status: DISCONTINUED | OUTPATIENT
Start: 2019-02-05 | End: 2019-02-15

## 2019-02-05 RX ORDER — AMOXICILLIN 250 MG
1 CAPSULE ORAL 2 TIMES DAILY
Status: DISCONTINUED | OUTPATIENT
Start: 2019-02-05 | End: 2019-02-15 | Stop reason: HOSPADM

## 2019-02-05 RX ORDER — LISINOPRIL 20 MG/1
20 TABLET ORAL DAILY
Status: DISCONTINUED | OUTPATIENT
Start: 2019-02-05 | End: 2019-02-15 | Stop reason: HOSPADM

## 2019-02-05 RX ORDER — GLUCAGON 1 MG
1 KIT INJECTION
Status: DISCONTINUED | OUTPATIENT
Start: 2019-02-05 | End: 2019-02-15 | Stop reason: HOSPADM

## 2019-02-05 RX ORDER — GABAPENTIN 300 MG/1
600 CAPSULE ORAL 3 TIMES DAILY
Status: DISCONTINUED | OUTPATIENT
Start: 2019-02-05 | End: 2019-02-15 | Stop reason: HOSPADM

## 2019-02-05 RX ORDER — NAPROXEN 250 MG/1
500 TABLET ORAL 2 TIMES DAILY
Status: DISCONTINUED | OUTPATIENT
Start: 2019-02-05 | End: 2019-02-10

## 2019-02-05 RX ORDER — ENOXAPARIN SODIUM 100 MG/ML
40 INJECTION SUBCUTANEOUS EVERY 24 HOURS
Status: DISCONTINUED | OUTPATIENT
Start: 2019-02-06 | End: 2019-02-06

## 2019-02-05 RX ORDER — OXYCODONE HYDROCHLORIDE 5 MG/1
5 TABLET ORAL EVERY 6 HOURS PRN
Status: DISCONTINUED | OUTPATIENT
Start: 2019-02-05 | End: 2019-02-15 | Stop reason: HOSPADM

## 2019-02-05 RX ORDER — MORPHINE SULFATE 2 MG/ML
2 INJECTION, SOLUTION INTRAMUSCULAR; INTRAVENOUS EVERY 4 HOURS PRN
Status: DISCONTINUED | OUTPATIENT
Start: 2019-02-05 | End: 2019-02-10

## 2019-02-05 RX ORDER — NAPROXEN SODIUM 220 MG/1
81 TABLET, FILM COATED ORAL DAILY
Status: DISCONTINUED | OUTPATIENT
Start: 2019-02-06 | End: 2019-02-15 | Stop reason: HOSPADM

## 2019-02-05 RX ORDER — HEPARIN SODIUM,PORCINE/D5W 25000/250
18 INTRAVENOUS SOLUTION INTRAVENOUS CONTINUOUS
Status: DISCONTINUED | OUTPATIENT
Start: 2019-02-06 | End: 2019-02-15

## 2019-02-05 RX ORDER — IBUPROFEN 200 MG
16 TABLET ORAL
Status: DISCONTINUED | OUTPATIENT
Start: 2019-02-05 | End: 2019-02-15 | Stop reason: HOSPADM

## 2019-02-05 RX ORDER — IBUPROFEN 200 MG
24 TABLET ORAL
Status: DISCONTINUED | OUTPATIENT
Start: 2019-02-05 | End: 2019-02-15 | Stop reason: HOSPADM

## 2019-02-05 RX ORDER — PROMETHAZINE HYDROCHLORIDE 25 MG/1
25 TABLET ORAL EVERY 6 HOURS PRN
Status: DISCONTINUED | OUTPATIENT
Start: 2019-02-05 | End: 2019-02-15 | Stop reason: HOSPADM

## 2019-02-05 RX ORDER — ATORVASTATIN CALCIUM 10 MG/1
40 TABLET, FILM COATED ORAL DAILY
Status: DISCONTINUED | OUTPATIENT
Start: 2019-02-05 | End: 2019-02-15 | Stop reason: HOSPADM

## 2019-02-05 RX ORDER — IPRATROPIUM BROMIDE AND ALBUTEROL SULFATE 2.5; .5 MG/3ML; MG/3ML
3 SOLUTION RESPIRATORY (INHALATION) EVERY 4 HOURS PRN
Status: DISCONTINUED | OUTPATIENT
Start: 2019-02-05 | End: 2019-02-15 | Stop reason: HOSPADM

## 2019-02-05 RX ORDER — DULOXETIN HYDROCHLORIDE 60 MG/1
60 CAPSULE, DELAYED RELEASE ORAL DAILY
Status: DISCONTINUED | OUTPATIENT
Start: 2019-02-05 | End: 2019-02-15 | Stop reason: HOSPADM

## 2019-02-05 RX ORDER — AMLODIPINE BESYLATE 10 MG/1
10 TABLET ORAL DAILY
Status: DISCONTINUED | OUTPATIENT
Start: 2019-02-05 | End: 2019-02-15 | Stop reason: HOSPADM

## 2019-02-05 RX ORDER — OXYCODONE HYDROCHLORIDE 10 MG/1
10 TABLET ORAL EVERY 6 HOURS PRN
Status: DISCONTINUED | OUTPATIENT
Start: 2019-02-05 | End: 2019-02-05

## 2019-02-05 RX ADMIN — OXYCODONE HYDROCHLORIDE 5 MG: 5 TABLET ORAL at 03:02

## 2019-02-05 RX ADMIN — Medication 2 MG: at 06:02

## 2019-02-05 RX ADMIN — NICOTINE 1 PATCH: 21 PATCH, EXTENDED RELEASE TRANSDERMAL at 02:02

## 2019-02-05 RX ADMIN — Medication 2 MG: at 11:02

## 2019-02-05 RX ADMIN — OXYCODONE HYDROCHLORIDE 5 MG: 5 TABLET ORAL at 02:02

## 2019-02-05 RX ADMIN — DULOXETINE 60 MG: 60 CAPSULE, DELAYED RELEASE ORAL at 08:02

## 2019-02-05 RX ADMIN — Medication 2 MG: at 08:02

## 2019-02-05 RX ADMIN — HYDROCODONE BITARTRATE AND ACETAMINOPHEN 1 TABLET: 10; 325 TABLET ORAL at 12:02

## 2019-02-05 RX ADMIN — HYDROCHLOROTHIAZIDE 12.5 MG: 12.5 TABLET ORAL at 08:02

## 2019-02-05 RX ADMIN — LISINOPRIL 20 MG: 20 TABLET ORAL at 08:02

## 2019-02-05 RX ADMIN — GABAPENTIN 600 MG: 100 CAPSULE ORAL at 03:02

## 2019-02-05 RX ADMIN — ATORVASTATIN CALCIUM 40 MG: 10 TABLET, FILM COATED ORAL at 08:02

## 2019-02-05 RX ADMIN — NAPROXEN 500 MG: 250 TABLET ORAL at 08:02

## 2019-02-05 RX ADMIN — GABAPENTIN 600 MG: 100 CAPSULE ORAL at 08:02

## 2019-02-05 RX ADMIN — OXYCODONE HYDROCHLORIDE 5 MG: 5 TABLET ORAL at 10:02

## 2019-02-05 RX ADMIN — OXYCODONE HYDROCHLORIDE 5 MG: 5 TABLET ORAL at 08:02

## 2019-02-05 RX ADMIN — STANDARDIZED SENNA CONCENTRATE AND DOCUSATE SODIUM 1 TABLET: 8.6; 5 TABLET, FILM COATED ORAL at 08:02

## 2019-02-05 RX ADMIN — AMLODIPINE BESYLATE 10 MG: 10 TABLET ORAL at 08:02

## 2019-02-05 NOTE — PROGRESS NOTES
[facility]  Vascular Surgery  Progress Note    Patient Name: Ren Grove  MRN: [id number]  Admission Date: 2/4/2019  Primary Care Provider: Donald Vega MD    Subjective:     Interval History: NAEON. AFVSS. Pt resting comfortably in bed. Pt has improved pain and increased warmth to the R stump with smoking cessation.    Post-Op Info:  * No surgery found *           Medications Prior to Admission   Medication Sig Dispense Refill Last Dose    alprazolam (XANAX) 0.5 MG tablet Take 0.5 mg by mouth 3 (three) times daily as needed for Anxiety.    Past Week    amlodipine (NORVASC) 10 MG tablet Take 1 tablet (10 mg total) by mouth once daily. 30 tablet 11 9/18/2017    atorvastatin (LIPITOR) 40 MG tablet Take 40 mg by mouth once daily.   9/18/2017    diclofenac (VOLTAREN) 50 MG EC tablet Take 1 tablet (50 mg total) by mouth 2 (two) times daily. 21 tablet 0     duloxetine (CYMBALTA) 60 MG capsule Take 60 mg by mouth once daily.   9/18/2017    gabapentin (NEURONTIN) 300 MG capsule Take 2 capsules (600 mg total) by mouth 3 (three) times daily. Take one tablet by mouth 3 to 4 times daily 180 capsule 1 2/9/2017    hydrocodone-acetaminophen 5-325mg (NORCO) 5-325 mg per tablet Take 1 tablet by mouth every 8 (eight) hours as needed. 10 tablet 0 Unknown    ibuprofen (ADVIL,MOTRIN) 200 MG tablet Take 2 tablets (400 mg total) by mouth every 6 (six) hours as needed for Pain. 30 tablet 0 Past Week    INV LISINOPRIL-HCTZ 20-12.5 Take 1 tablet by mouth once daily. For investigational use only.   9/18/2017    naproxen (NAPROSYN) 500 MG tablet Take 500 mg by mouth 2 (two) times daily.  0 Unknown    traMADol (ULTRAM) 50 mg tablet Take 1 tablet (50 mg total) by mouth every 6 (six) hours as needed for Pain. 8 tablet 0        Review of patient's allergies indicates:  No Known Allergies    Past Medical History:   Diagnosis Date    Amputation of lower limb     left below the knee    Compartment syndrome of  left lower extremity     Compartment syndrome of right lower extremity     Depression 3/18/2016    DVT (deep venous thrombosis)     Encounter for cholesteral screening for cardiovascular disease     Hypertension     Tachycardia      Past Surgical History:   Procedure Laterality Date     Fasciotomy closure Left 12/11/2015    [facility] OR [facility]    AMPUTATION, LOWER LIMB  2012    right BKA    FASCIOTOMY-COMPARTMENT Left 12/9/2015    Per[facility] Kaylan [facility] at [facility] OR [facility]    INCISION-DRAINAGE-HEMATOMA Left 12/30/2015    Performed by Kaylan Valencia MD at [facility] OR [facility]ND [facility]    KNEE SURGERY      TIBIAL FASCIOTOMY Right 12/08/2015    VENOGRAM Left 3/3/2016    Performed by Filemon Goddard MD at [facility] CATH LAB     Family History     None        Tobacco Use    Smoking status: Current Every Day Smoker     Packs/day: 1.00     Types: Cigarettes    Smokeless tobacco: Never Used   Substance and Sexual Activity    Alcohol use: Yes     Comment: occas    Drug use: No    Sexual activity: Not on file     Review of Systems   Constitutional: Negative for chills and fever.   Respiratory: Negative for cough and shortness of breath.    Cardiovascular: Negative for chest pain and palpitations.   Gastrointestinal: Negative for abdominal distention and abdominal pain.   Neurological: Negative for dizziness and headaches.   Psychiatric/Behavioral: Negative for agitation and confusion.     Objective:     Vital Signs (Most Recent):  Temp: 98.4 °F (36.9 °C) (02/05/19 0343)  Pulse: 85 (02/05/19 0343)  Resp: 18 (02/05/19 0343)  BP: (!) 145/94 (02/05/19 0343)  SpO2: 96 % (02/05/19 0343) Vital Signs (24h Range):  Temp:  [97.8 °F (36.6 °C)-98.5 °F (36.9 °C)] 98.4 °F (36.9 °C)  Pulse:  [] 85  Resp:  [16-20] 18  SpO2:  [96 %-99 %] 96 %  BP: (140-176)/(74-99) 145/94     Weight: 90.7 kg (200 lb)  Body mass index is 29.53 kg/m².    Physical Exam   Constitutional: He is oriented to person,  place, and time. He appears well-developed and well-nourished. No distress.   Cardiovascular: Normal rate.   Pulmonary/Chest: Effort normal. No respiratory distress.   Musculoskeletal:   Right BKA tender to palpation, well demarcated erythema extending to mid thigh. Stump cool to touch. 2+ femoral pulses.  Left leg with well healed fasciotomy scars   Neurological: He is alert and oriented to person, place, and time.       Significant Labs:  CBC:   Recent Labs   Lab 02/05/19  0523   WBC 9.48   RBC 4.97   HGB 15.1   HCT 45.5      MCV 92   MCH 30.4   MCHC 33.2     CMP:   Recent Labs   Lab 02/05/19  0523   GLU 88   CALCIUM 9.4   ALBUMIN 3.7   PROT 7.1      K 3.6   CO2 25      BUN 12   CREATININE 0.8   ALKPHOS 78   ALT 11   AST 20   BILITOT 0.7       Significant Diagnostics:  I have reviewed all pertinent imaging results/findings within the past 24 hours.    Assessment/Plan:     Leg erythema    Similar presentation to 2015, cool leg with strict demarcation of erythema at mid thigh, no leukocytosis. No evidence of DVT on doppler.     - CTA lower extremity with 50% stenosis to R popliteal.  - Discussed smoking cessation again. Patient improving with increased warmth to the R stump. Will continue smoking cessation and continue to monitor.           Donn Cardona MD  Vascular Surgery  [facility]

## 2019-02-05 NOTE — ASSESSMENT & PLAN NOTE
Similar presentation to 2015, cool leg with strict demarcation of erythema at mid thigh, no leukocytosis. No evidence of DVT on doppler.     - CTA lower extremity with 50% stenosis to R popliteal.  - Discussed smoking cessation again. Patient improving with increased warmth to the R stump. Will continue smoking cessation and continue to monitor.

## 2019-02-05 NOTE — SUBJECTIVE & OBJECTIVE
Past Medical History:   Diagnosis Date    Amputation of lower limb     left below the knee    Compartment syndrome of left lower extremity     Compartment syndrome of right lower extremity     Depression 3/18/2016    DVT (deep venous thrombosis)     Encounter for cholesteral screening for cardiovascular disease     Hypertension     Tachycardia        Past Surgical History:   Procedure Laterality Date     Fasciotomy closure Left 12/11/2015    Performed by [facility]lan Valencia MD at [facility] OR 2ND FLR    AMPUTATION, LOWER LIMB  2012    right BKA    FASCIOTOMY-COMPARTMENT Left 12/9/2015    Performed by Kay[facility] MD at [facility] OR 2ND FLR    INCISION-DRAINAGE-HEMATOMA Left 12/30/2015    Performed by Kaylan Valencia MD at [facility] OR 2ND FLR    KNEE SURGERY      TIBIAL FASCIOTOMY Right 12/08/2015    VENOGRAM Left 3/3/2016    Performed by Filemon Goddard MD at [facility] CATH LAB       Review of patient's allergies indicates:  No Known Allergies    No current facility-administered medications on file prior to encounter.      Current Outpatient Medications on File Prior to Encounter   Medication Sig    alprazolam (XANAX) 0.5 MG tablet Take 0.5 mg by mouth 3 (three) times daily as needed for Anxiety.     amlodipine (NORVASC) 10 MG tablet Take 1 tablet (10 mg total) by mouth once daily.    atorvastatin (LIPITOR) 40 MG tablet Take 40 mg by mouth once daily.    diclofenac (VOLTAREN) 50 MG EC tablet Take 1 tablet (50 mg total) by mouth 2 (two) times daily.    duloxetine (CYMBALTA) 60 MG capsule Take 60 mg by mouth once daily.    gabapentin (NEURONTIN) 300 MG capsule Take 2 capsules (600 mg total) by mouth 3 (three) times daily. Take one tablet by mouth 3 to 4 times daily    hydrocodone-acetaminophen 5-325mg (NORCO) 5-325 mg per tablet Take 1 tablet by mouth every 8 (eight) hours as needed.    ibuprofen (ADVIL,MOTRIN) 200 MG tablet Take 2 tablets (400 mg total) by mouth every 6 (six) hours as needed for Pain.     INV LISINOPRIL-HCTZ 20-12.5 Take 1 tablet by mouth once daily. For investigational use only.    naproxen (NAPROSYN) 500 MG tablet Take 500 mg by mouth 2 (two) times daily.    traMADol (ULTRAM) 50 mg tablet Take 1 tablet (50 mg total) by mouth every 6 (six) hours as needed for Pain.     Family History     None        Tobacco Use    Smoking status: Current Every Day Smoker     Packs/day: 1.00     Types: Cigarettes    Smokeless tobacco: Never Used   Substance and Sexual Activity    Alcohol use: Yes     Comment: occas    Drug use: No    Sexual activity: Not on file     Review of Systems   Constitutional: Negative for activity change, appetite change, chills, diaphoresis, fatigue, fever and unexpected weight change.   HENT: Negative for congestion, rhinorrhea, sore throat, trouble swallowing and voice change.    Eyes: Negative for visual disturbance.   Respiratory: Negative for cough, choking, chest tightness, shortness of breath and wheezing.    Cardiovascular: Negative for chest pain, palpitations and leg swelling.   Gastrointestinal: Negative for abdominal distention, abdominal pain, anal bleeding, blood in stool, constipation, diarrhea, nausea and vomiting.   Endocrine: Negative for cold intolerance, heat intolerance, polydipsia and polyuria.   Genitourinary: Negative for decreased urine volume, dysuria, flank pain, frequency, hematuria and urgency.   Musculoskeletal: Positive for arthralgias (RLE) and myalgias (RLE). Negative for back pain and joint swelling.   Skin: Positive for color change (RLE). Negative for rash.   Neurological: Negative for dizziness, seizures, syncope, facial asymmetry, speech difficulty, weakness, light-headedness, numbness and headaches.   Hematological: Negative for adenopathy. Does not bruise/bleed easily.   Psychiatric/Behavioral: Negative for agitation, confusion, hallucinations and suicidal ideas.     Objective:     Vital Signs (Most Recent):  Temp: 97.9 °F (36.6 °C) (02/04/19  2000)  Pulse: 82 (02/05/19 0030)  Resp: 20 (02/05/19 0030)  BP: (!) 140/74 (02/05/19 0030)  SpO2: 99 % (02/05/19 0030) Vital Signs (24h Range):  Temp:  [97.8 °F (36.6 °C)-98.5 °F (36.9 °C)] 97.9 °F (36.6 °C)  Pulse:  [] 82  Resp:  [16-20] 20  SpO2:  [97 %-99 %] 99 %  BP: (140-176)/(74-99) 140/74     Weight: 90.7 kg (200 lb)  Body mass index is 29.53 kg/m².    Physical Exam   Constitutional: He is oriented to person, place, and time. He appears well-developed and well-nourished. No distress.   HENT:   Head: Normocephalic and atraumatic.   Eyes: Pupils are equal, round, and reactive to light.   Neck: Neck supple. Carotid bruit is not present. No thyromegaly present.   Cardiovascular: Normal rate and regular rhythm. Exam reveals no gallop.   No murmur heard.  Pulmonary/Chest: Effort normal and breath sounds normal. No respiratory distress. He has no wheezes.   Abdominal: Bowel sounds are normal. He exhibits no distension. There is no splenomegaly or hepatomegaly. There is no tenderness.   Musculoskeletal: Normal range of motion. He exhibits no edema.   S/p right BKA now with pain to palpation of R stump, erythema to mid thigh, 2+ femoral pulses, no evidence of trauma, skin cool to touch   Neurological: He is alert and oriented to person, place, and time. No cranial nerve deficit or sensory deficit.   Skin: Skin is warm and dry. No rash noted.   Psychiatric: He has a normal mood and affect. His behavior is normal.         CRANIAL NERVES     CN III, IV, VI   Pupils are equal, round, and reactive to light.       Significant Labs:   Blood Culture: No results for input(s): LABBLOO in the last 48 hours.  CBC:   Recent Labs   Lab 02/04/19  1640 02/05/19  0159   WBC 11.91 8.00   HGB 16.1 11.4*   HCT 46.6 34.4*    193     CMP:   Recent Labs   Lab 02/04/19  1640      K 3.7      CO2 23   GLU 89   BUN 12   CREATININE 0.7   CALCIUM 9.9   PROT 7.9   ALBUMIN 4.0   BILITOT 0.4   ALKPHOS 97   AST 21   ALT 10    ANIONGAP 9   EGFRNONAA >60.0     Coagulation:   Recent Labs   Lab 02/05/19  0159   INR 0.9   APTT 30.2       Significant Imaging: I have reviewed all pertinent imaging results/findings within the past 24 hours.

## 2019-02-05 NOTE — PROGRESS NOTES
Hospital Medicine   Progress note   Team: [facility] HOSP MED O Letty Todd MD   Admit Date: 2/4/2019   COLEEN 2/6/2019   Code status: Full Code   Principal Problem: Vascular complication     Interval hx:  Patient admitted overnight for right BKA pain.  CTA performed which shows about 50% stenosis at the right popliteal artery. Vascular surgery on board, no acute surgical intervention.  Stump is much warmer today.  Currently on heparin drip.  No evidence of DVT.    ROS   Constitutional: Negative for chills, fatigue, fever.   HENT: Negative for sore throat, trouble swallowing.    Eyes: Negative for photophobia, visual disturbance.   Respiratory: Negative for cough, shortness of breath.    Cardiovascular: Negative for chest pain, palpitations, leg swelling.   Gastrointestinal: Negative for abdominal pain, constipation, diarrhea, nausea, vomiting.   Endocrine: Negative for cold intolerance, heat intolerance.   Genitourinary: Negative for dysuria, frequency.   Musculoskeletal: + arthralgias, myalgias (RLE)  Skin: Negative for rash, erythema , +skin irritation in RLE  Neurological: Negative for dizziness, syncope, weakness, light-headedness.   Psychiatric/Behavioral: Negative for confusion, hallucinations, anxiety  All other systems reviewed and are negative.    PEx   Temp:  [97.8 °F (36.6 °C)-98.5 °F (36.9 °C)]   Pulse:  []   Resp:  [16-20]   BP: (140-176)/(74-99)   SpO2:  [96 %-99 %]      I & O (Last 24H):   No intake or output data in the 24 hours ending 02/05/19 0753    General appearance: no distress, alert and oriented x 3  HEENT:  conjunctivae/corneas clear, PERRL, mucous membranes moist   Neck: supple, thyroid not enlarged  Pulm:   normal respiratory effort, CTAB, no wheezes, rales or rhonchi  Card: RRR, S1, S2 normal, no murmur, click, rub or gallop  Abd: soft, NT, ND, BS present; no masses, no organomegaly  Ext: R BKA with warmth noted and mild erythema, still less warm then LLE, mild skin irritation at the base  around the site where prosthesis attaches  Pulses: 2+, symmetric  Skin: color, texture, turgor normal. No rashes or lesions  Neuro: CN II-XII grossly intact, no focal numbness or weakness, normal strength and tone   Psych: normal mood and affect      Recent Results (from the past 24 hour(s))   CBC auto differential    Collection Time: 02/04/19  4:40 PM   Result Value Ref Range    WBC 11.91 3.90 - 12.70 K/uL    RBC 5.21 4.60 - 6.20 M/uL    Hemoglobin 16.1 14.0 - 18.0 g/dL    Hematocrit 46.6 40.0 - 54.0 %    MCV 89 82 - 98 fL    MCH 30.9 27.0 - 31.0 pg    MCHC 34.5 32.0 - 36.0 g/dL    RDW 13.1 11.5 - 14.5 %    Platelets 268 150 - 350 K/uL    MPV 10.4 9.2 - 12.9 fL    Immature Granulocytes 0.3 0.0 - 0.5 %    Gran # (ANC) 7.5 1.8 - 7.7 K/uL    Immature Grans (Abs) 0.03 0.00 - 0.04 K/uL    Lymph # 3.5 1.0 - 4.8 K/uL    Mono # 0.7 0.3 - 1.0 K/uL    Eos # 0.1 0.0 - 0.5 K/uL    Baso # 0.07 0.00 - 0.20 K/uL    nRBC 0 0 /100 WBC    Gran% 62.9 38.0 - 73.0 %    Lymph% 29.6 18.0 - 48.0 %    Mono% 5.5 4.0 - 15.0 %    Eosinophil% 1.1 0.0 - 8.0 %    Basophil% 0.6 0.0 - 1.9 %    Differential Method Automated    Comprehensive metabolic panel    Collection Time: 02/04/19  4:40 PM   Result Value Ref Range    Sodium 140 136 - 145 mmol/L    Potassium 3.7 3.5 - 5.1 mmol/L    Chloride 108 95 - 110 mmol/L    CO2 23 23 - 29 mmol/L    Glucose 89 70 - 110 mg/dL    BUN, Bld 12 6 - 20 mg/dL    Creatinine 0.7 0.5 - 1.4 mg/dL    Calcium 9.9 8.7 - 10.5 mg/dL    Total Protein 7.9 6.0 - 8.4 g/dL    Albumin 4.0 3.5 - 5.2 g/dL    Total Bilirubin 0.4 0.1 - 1.0 mg/dL    Alkaline Phosphatase 97 55 - 135 U/L    AST 21 10 - 40 U/L    ALT 10 10 - 44 U/L    Anion Gap 9 8 - 16 mmol/L    eGFR if African American >60.0 >60 mL/min/1.73 m^2    eGFR if non African American >60.0 >60 mL/min/1.73 m^2   CPK    Collection Time: 02/04/19  4:40 PM   Result Value Ref Range     (H) 20 - 200 U/L   Lactic acid, plasma    Collection Time: 02/04/19  4:40 PM   Result  Value Ref Range    Lactate (Lactic Acid) 0.8 0.5 - 2.2 mmol/L   Sedimentation rate    Collection Time: 02/04/19  4:40 PM   Result Value Ref Range    Sed Rate 43 (H) 0 - 23 mm/Hr   C-reactive protein    Collection Time: 02/04/19  4:40 PM   Result Value Ref Range    CRP 8.7 (H) 0.0 - 8.2 mg/L   APTT    Collection Time: 02/05/19  1:59 AM   Result Value Ref Range    aPTT 30.2 21.0 - 32.0 sec   Protime-INR    Collection Time: 02/05/19  1:59 AM   Result Value Ref Range    Prothrombin Time 10.0 9.0 - 12.5 sec    INR 0.9 0.8 - 1.2   CBC auto differential    Collection Time: 02/05/19  1:59 AM   Result Value Ref Range    WBC 8.00 3.90 - 12.70 K/uL    RBC 3.72 (L) 4.60 - 6.20 M/uL    Hemoglobin 11.4 (L) 14.0 - 18.0 g/dL    Hematocrit 34.4 (L) 40.0 - 54.0 %    MCV 93 82 - 98 fL    MCH 30.6 27.0 - 31.0 pg    MCHC 33.1 32.0 - 36.0 g/dL    RDW 13.2 11.5 - 14.5 %    Platelets 193 150 - 350 K/uL    MPV 10.4 9.2 - 12.9 fL    Immature Granulocytes 0.1 0.0 - 0.5 %    Gran # (ANC) 4.1 1.8 - 7.7 K/uL    Immature Grans (Abs) 0.01 0.00 - 0.04 K/uL    Lymph # 3.1 1.0 - 4.8 K/uL    Mono # 0.6 0.3 - 1.0 K/uL    Eos # 0.2 0.0 - 0.5 K/uL    Baso # 0.05 0.00 - 0.20 K/uL    nRBC 0 0 /100 WBC    Gran% 51.3 38.0 - 73.0 %    Lymph% 38.5 18.0 - 48.0 %    Mono% 6.9 4.0 - 15.0 %    Eosinophil% 2.6 0.0 - 8.0 %    Basophil% 0.6 0.0 - 1.9 %    Differential Method Automated    CBC auto differential    Collection Time: 02/05/19  5:23 AM   Result Value Ref Range    WBC 9.48 3.90 - 12.70 K/uL    RBC 4.97 4.60 - 6.20 M/uL    Hemoglobin 15.1 14.0 - 18.0 g/dL    Hematocrit 45.5 40.0 - 54.0 %    MCV 92 82 - 98 fL    MCH 30.4 27.0 - 31.0 pg    MCHC 33.2 32.0 - 36.0 g/dL    RDW 13.2 11.5 - 14.5 %    Platelets 265 150 - 350 K/uL    MPV 11.1 9.2 - 12.9 fL    Immature Granulocytes 0.1 0.0 - 0.5 %    Gran # (ANC) 4.6 1.8 - 7.7 K/uL    Immature Grans (Abs) 0.01 0.00 - 0.04 K/uL    Lymph # 4.0 1.0 - 4.8 K/uL    Mono # 0.6 0.3 - 1.0 K/uL    Eos # 0.3 0.0 - 0.5 K/uL     Baso # 0.08 0.00 - 0.20 K/uL    nRBC 0 0 /100 WBC    Gran% 48.2 38.0 - 73.0 %    Lymph% 41.7 18.0 - 48.0 %    Mono% 6.4 4.0 - 15.0 %    Eosinophil% 2.8 0.0 - 8.0 %    Basophil% 0.8 0.0 - 1.9 %    Differential Method Automated    Comprehensive Metabolic Panel (CMP)    Collection Time: 02/05/19  5:23 AM   Result Value Ref Range    Sodium 139 136 - 145 mmol/L    Potassium 3.6 3.5 - 5.1 mmol/L    Chloride 105 95 - 110 mmol/L    CO2 25 23 - 29 mmol/L    Glucose 88 70 - 110 mg/dL    BUN, Bld 12 6 - 20 mg/dL    Creatinine 0.8 0.5 - 1.4 mg/dL    Calcium 9.4 8.7 - 10.5 mg/dL    Total Protein 7.1 6.0 - 8.4 g/dL    Albumin 3.7 3.5 - 5.2 g/dL    Total Bilirubin 0.7 0.1 - 1.0 mg/dL    Alkaline Phosphatase 78 55 - 135 U/L    AST 20 10 - 40 U/L    ALT 11 10 - 44 U/L    Anion Gap 9 8 - 16 mmol/L    eGFR if African American >60.0 >60 mL/min/1.73 m^2    eGFR if non African American >60.0 >60 mL/min/1.73 m^2   Magnesium    Collection Time: 02/05/19  5:23 AM   Result Value Ref Range    Magnesium 2.3 1.6 - 2.6 mg/dL   Phosphorus    Collection Time: 02/05/19  5:23 AM   Result Value Ref Range    Phosphorus 2.8 2.7 - 4.5 mg/dL   Hemoglobin A1c    Collection Time: 02/05/19  5:23 AM   Result Value Ref Range    Hemoglobin A1C 5.1 4.0 - 5.6 %    Estimated Avg Glucose 100 68 - 131 mg/dL       No results for input(s): POCTGLUCOSE in the last 168 hours.    Hemoglobin A1C   Date Value Ref Range Status   02/05/2019 5.1 4.0 - 5.6 % Final     Comment:     ADA Screening Guidelines:  5.7-6.4%  Consistent with prediabetes  >or=6.5%  Consistent with diabetes  High levels of fetal hemoglobin interfere with the HbA1C  assay. Heterozygous hemoglobin variants (HbS, HgC, etc)do  not significantly interfere with this assay.   However, presence of multiple variants may affect accuracy.     12/12/2015 5.3 4.5 - 6.2 % Final        Active Hospital Problems    Diagnosis  POA    *Vascular complication [I99.9]  Yes    Tobacco abuse [Z72.0]  Yes     Chronic    HLD  (hyperlipidemia) [E78.5]  Yes     Chronic    Essential hypertension [I10]  Yes     Chronic    Anxiety and depression [F41.9, F32.9]  Yes     Chronic      Resolved Hospital Problems   No resolved problems to display.         amLODIPine  10 mg Oral Daily    atorvastatin  40 mg Oral Daily    DULoxetine  60 mg Oral Daily    gabapentin  600 mg Oral TID    hydroCHLOROthiazide  12.5 mg Oral Daily    lisinopril  20 mg Oral Daily    naproxen  500 mg Oral BID    nicotine  1 patch Transdermal Daily    senna-docusate 8.6-50 mg  1 tablet Oral BID     acetaminophen, albuterol-ipratropium, ALPRAZolam, dextrose 50%, dextrose 50%, glucagon (human recombinant), glucose, glucose, heparin (PORCINE), heparin (PORCINE), morphine, ondansetron, oxyCODONE, promethazine, ramelteon, sodium chloride 0.9%, sodium chloride 0.9%      Assessment and Plan for problems addressed today:   1. Vascular complication with history of right below knee amputation  - ESR 43, CRP 8.7, .  - UA RLE arteries and veins unremarkable.  - CTA RLE showed extensive atherosclerosis resulting in focal narrowing at the proximal aspect of the right popliteal artery likely representing 50% stenosis.  No vascular flow visualized beyond the proximal anterior tibial artery; uncertain if this represents normal postoperative vascular morphology given lack of prior imaging.  - Appreciate vascular surgery's assistance. No acute surgical intervention planned  - will d/c heparin drip per vascular recs      2. Tobacco abuse  - Nicoderm patch.  - Counseled on cessation      3. Anxiety and depression  - Continue Cymbalta 60mg daily and PRN Xanax 0.5mg TID PRN.     4. HLD (hyperlipidemia)  - Continue Lipitor 40mg daily.     5. Essential hypertension  - Continue amlodipine 10mg daily, HCTZ 12.5mg daily, lisinopril 20mg daily.           Diet: cardiac  DVT PPx: heparin   Code status: FULL     Discharge plan and follow up:  D/c heparin drip per vascular surgery recs, will  follow up blood pressure in a.m., if leg continues to improve, possible discharge home tomorrow, will ask case management if can get assistance with the prosthesis since he does not have insurance until June    Letty Todd MD  [facility] Medicine Staff  [phone] pager

## 2019-02-05 NOTE — ASSESSMENT & PLAN NOTE
Similar presentation to 2015, cool leg with strict demarcation of erythema at mid thigh, no leukocytosis. No evidence of DVT on doppler.     - CTA lower extremity, further recs to follow pending results   - Discussed smoking cessation

## 2019-02-05 NOTE — PLAN OF CARE
Problem: Adult Inpatient Plan of Care  Goal: Rounds/Family Conference  Patient resting in bed comfortably. IV intact and free of infection and irration, fall precautions maintained no falls noted. Call light in reach bed locked and in lowest position. Non skid socks on while out of bed. Patient instructed to call for assistance. Skin integrity maintained as patient is independent with frequent positioning, C/o pain managed with PRN meds, No other complaints or concerns. Progressing towards goals. Hep infusion stopped per order, Will continue to monitor and follow careplan of care.

## 2019-02-05 NOTE — SUBJECTIVE & OBJECTIVE
Interval Hx: NAEON. AFVSS. Pt resting comfortably in bed. Pt has improved pain and coldness to the RLE after smoking cessation.     Medications Prior to Admission   Medication Sig Dispense Refill Last Dose    alprazolam (XANAX) 0.5 MG tablet Take 0.5 mg by mouth 3 (three) times daily as needed for Anxiety.    Past Week    amlodipine (NORVASC) 10 MG tablet Take 1 tablet (10 mg total) by mouth once daily. 30 tablet 11 9/18/2017    atorvastatin (LIPITOR) 40 MG tablet Take 40 mg by mouth once daily.   9/18/2017    diclofenac (VOLTAREN) 50 MG EC tablet Take 1 tablet (50 mg total) by mouth 2 (two) times daily. 21 tablet 0     duloxetine (CYMBALTA) 60 MG capsule Take 60 mg by mouth once daily.   9/18/2017    gabapentin (NEURONTIN) 300 MG capsule Take 2 capsules (600 mg total) by mouth 3 (three) times daily. Take one tablet by mouth 3 to 4 times daily 180 capsule 1 2/9/2017    hydrocodone-acetaminophen 5-325mg (NORCO) 5-325 mg per tablet Take 1 tablet by mouth every 8 (eight) hours as needed. 10 tablet 0 Unknown    ibuprofen (ADVIL,MOTRIN) 200 MG tablet Take 2 tablets (400 mg total) by mouth every 6 (six) hours as needed for Pain. 30 tablet 0 Past Week    INV LISINOPRIL-HCTZ 20-12.5 Take 1 tablet by mouth once daily. For investigational use only.   9/18/2017    naproxen (NAPROSYN) 500 MG tablet Take 500 mg by mouth 2 (two) times daily.  0 Unknown    traMADol (ULTRAM) 50 mg tablet Take 1 tablet (50 mg total) by mouth every 6 (six) hours as needed for Pain. 8 tablet 0        Review of patient's allergies indicates:  No Known Allergies    Past Medical History:   Diagnosis Date    Amputation of lower limb     left below the knee    Compartment syndrome of left lower extremity     Compartment syndrome of right lower extremity     Depression 3/18/2016    DVT (deep venous thrombosis)     Encounter for cholesteral screening for cardiovascular disease     Hypertension     Tachycardia      Past Surgical History:    Procedure Laterality Date     Fasciotomy closure Left 12/11/2015    Performed by [facility] at [facility] 2ND FLR    AMPUTATION, LOWER LIMB  2012    right BKA    FASCIOTOMY-COMPARTMENT Left 12/9/2015    Performed by [facility] MD at [facility] OR 2ND FLR    INCISION-DRAINAGE-HEMATOMA Left 12/30/2015    Performed by [facility] MD at [facility] OR 2ND FLR    KNEE SURGERY      TIBIAL FASCIOTOMY Right 12/08/2015    VENOGRAM Left 3/3/2016    Performed by Filemon Goddard MD at [facility] CATH LAB     Family History     None        Tobacco Use    Smoking status: Current Every Day Smoker     Packs/day: 1.00     Types: Cigarettes    Smokeless tobacco: Never Used   Substance and Sexual Activity    Alcohol use: Yes     Comment: occas    Drug use: No    Sexual activity: Not on file     Review of Systems   Constitutional: Negative for chills and fever.   Respiratory: Negative for cough and shortness of breath.    Cardiovascular: Negative for chest pain and palpitations.   Gastrointestinal: Negative for abdominal distention and abdominal pain.   Neurological: Negative for dizziness and headaches.   Psychiatric/Behavioral: Negative for agitation and confusion.     Objective:     Vital Signs (Most Recent):  Temp: 98.4 °F (36.9 °C) (02/05/19 0343)  Pulse: 85 (02/05/19 0343)  Resp: 18 (02/05/19 0343)  BP: (!) 145/94 (02/05/19 0343)  SpO2: 96 % (02/05/19 0343) Vital Signs (24h Range):  Temp:  [97.8 °F (36.6 °C)-98.5 °F (36.9 °C)] 98.4 °F (36.9 °C)  Pulse:  [] 85  Resp:  [16-20] 18  SpO2:  [96 %-99 %] 96 %  BP: (140-176)/(74-99) 145/94     Weight: 90.7 kg (200 lb)  Body mass index is 29.53 kg/m².    Physical Exam   Constitutional: He is oriented to person, place, and time. He appears well-developed and well-nourished. No distress.   Cardiovascular: Normal rate.   Pulmonary/Chest: Effort normal. No respiratory distress.   Musculoskeletal:   Right BKA tender to palpation, well demarcated erythema extending to mid  thigh. Stump cool to touch. 2+ femoral pulses.  Left leg with well healed fasciotomy scars   Neurological: He is alert and oriented to person, place, and time.       Significant Labs:  CBC:   Recent Labs   Lab 02/05/19  0523   WBC 9.48   RBC 4.97   HGB 15.1   HCT 45.5      MCV 92   MCH 30.4   MCHC 33.2     CMP:   Recent Labs   Lab 02/05/19  0523   GLU 88   CALCIUM 9.4   ALBUMIN 3.7   PROT 7.1      K 3.6   CO2 25      BUN 12   CREATININE 0.8   ALKPHOS 78   ALT 11   AST 20   BILITOT 0.7       Significant Diagnostics:  I have reviewed all pertinent imaging results/findings within the past 24 hours.

## 2019-02-05 NOTE — PLAN OF CARE
Primary Care  Dr. Bermeo   Phone [phone]      [id number] - [place], [place] - [address]  [address] [address]  Phone: [phone] Fax: [phone]    Payor: /         02/05/19 1322   Discharge Assessment   Assessment Type Discharge Planning Assessment   Confirmed/corrected address and phone number on facesheet? Yes   Assessment information obtained from? Patient   Prior to hospitilization cognitive status: Alert/Oriented   Prior to hospitalization functional status: Assistive Equipment   Current cognitive status: Alert/Oriented   Current Functional Status: Assistive Equipment   Lives With spouse   Able to Return to Prior Arrangements yes   Is patient able to care for self after discharge? Yes   Patient currently receives any other outside agency services? No   Equipment Currently Used at Home crutches;bath bench  (right leg prosthesis )   Do you have any problems affording any of your prescribed medications? Yes  (patient only has Medicare part A coverage )   Does the patient have transportation home? Yes   Transportation Anticipated family or friend will provide   Discharge Plan A Home with family   Discharge Plan B Home with family   DME Needed Upon Discharge  none   Patient/Family in Agreement with Plan yes

## 2019-02-05 NOTE — SUBJECTIVE & OBJECTIVE
(Not in a hospital admission)    Review of patient's allergies indicates:  No Known Allergies    Past Medical History:   Diagnosis Date    Amputation of lower limb     left below the knee    Compartment syndrome of left lower extremity     Compartment syndrome of right lower extremity     Depression 3/18/2016    DVT (deep venous thrombosis)     Encounter for cholesteral screening for cardiovascular disease     Hypertension     Tachycardia      Past Surgical History:   Procedure Laterality Date     Fasciotomy closure Left 12/11/2015    Performed by Kaylan Valencia [facility] at [facility] OR 2ND FLR    AMPUTATION, LOWER LIMB  2012    right BKA    FASCIOTOMY-COMPARTMENT Left 12/9/2015    Performed by Kaylan Valencia MD at [facility] OR 2ND FLR    INCISION-DRAINAGE-HEMATOMA Left 12/30/2015    Performed by Kaylan Valencia MD at [facility] OR 2ND FLR    KNEE SURGERY      TIBIAL FASCIOTOMY Right 12/08/2015    VENOGRAM Left 3/3/2016    Performed by Filemon Goddard MD at [facility] CATH LAB     Family History     None        Tobacco Use    Smoking status: Current Every Day Smoker     Packs/day: 1.00     Types: Cigarettes    Smokeless tobacco: Never Used   Substance and Sexual Activity    Alcohol use: Yes     Comment: occas    Drug use: No    Sexual activity: Not on file     Review of Systems   Constitutional: Negative for chills and fever.   Respiratory: Negative for cough and shortness of breath.    Cardiovascular: Negative for chest pain and palpitations.   Gastrointestinal: Negative for abdominal distention and abdominal pain.   Neurological: Negative for dizziness and headaches.   Psychiatric/Behavioral: Negative for agitation and confusion.     Objective:     Vital Signs (Most Recent):  Temp: 98.5 °F (36.9 °C) (02/04/19 1758)  Pulse: 98 (02/04/19 1758)  Resp: 16 (02/04/19 1758)  BP: (!) 143/97 (02/04/19 1758)  SpO2: 97 % (02/04/19 1758) Vital Signs (24h Range):  Temp:  [97.8 °F (36.6 °C)-98.5 °F (36.9 °C)] 98.5 °F (36.9  °C)  Pulse:  [] 98  Resp:  [16-18] 16  SpO2:  [97 %] 97 %  BP: (143-176)/(97-99) 143/97     Weight: 90.7 kg (200 lb)  Body mass index is 29.53 kg/m².    Physical Exam   Constitutional: He is oriented to person, place, and time. He appears well-developed and well-nourished. No distress.   Cardiovascular: Normal rate.   Pulmonary/Chest: Effort normal. No respiratory distress.   Musculoskeletal:   Right BKA tender to palpation, well demarcated erythema extending to mid thigh. Stump cool to touch. 2+ femoral pulses.  Left leg with well healed fasciotomy scars   Neurological: He is alert and oriented to person, place, and time.       Significant Labs:  CBC:   Recent Labs   Lab 02/04/19  1640   WBC 11.91   RBC 5.21   HGB 16.1   HCT 46.6      MCV 89   MCH 30.9   MCHC 34.5     CMP:   Recent Labs   Lab 02/04/19  1640   GLU 89   CALCIUM 9.9   ALBUMIN 4.0   PROT 7.9      K 3.7   CO2 23      BUN 12   CREATININE 0.7   ALKPHOS 97   ALT 10   AST 21   BILITOT 0.4       Significant Diagnostics:  I have reviewed all pertinent imaging results/findings within the past 24 hours.

## 2019-02-05 NOTE — H&P
[facility] Hospital Medicine  History & Physical    Patient Name: Ren Grove  MRN: [id number]  Admission Date: 2/4/2019  Attending Physician: Monica Bradshaw MD   Primary Care Provider: Donald Vega MD    [facility] Medicine Team: [facility] HOSP MED F Graciela Petty PA-C     Patient information was obtained from patient, spouse/SO, past medical records and ER records.     Subjective:     Principal Problem:Vascular complication    Chief Complaint:   Chief Complaint   Patient presents with    Leg Pain     bka stump cold to touch, had blood clot that caused amputation        HPI: Patient is a 38 year old gentleman with a h/o R BKA following DVT in 2012 and L compartment syndrome, HTN, and anxiety.  He presents with pain, coldness, and erythema to his right stump.  Pain began Sunday night and continued to worsen the following day.  Color change is consistent with area of contact with his prostetic liner.  It has not spread past demarcation.  Pain is worse with palpation and movement.  Denies trauma and is otherwise in his usual state of health.  Denies chest pain, SOB, dizziness, palpitaitons, fever/chills, N/V/D.    ED Course:  CRP, ESR, CPK elevated.  US of arteries and veins of RLE was unremarkable.  Patient was seen by Vascular surgery, who recommended CTA of lower extremity.  This showed extensive atherosclerosis resulting in focal narrowing at the proximal aspect of the right popliteal artery likely representing 50% stenosis.  No vascular flow visualized beyond the proximal anterior tibial artery; uncertain if this represents normal postoperative vascular morphology given lack of prior imaging.    Past Medical History:   Diagnosis Date    Amputation of lower limb     left below the knee    Compartment syndrome of left lower extremity     Compartment syndrome of right lower extremity     Depression 3/18/2016    DVT (deep venous thrombosis)     Encounter for cholesteral screening for  cardiovascular disease     Hypertension     Tachycardia        Past Surgical History:   Procedure Laterality Date     Fasciotomy closure Left 12/11/2015    Performed by Kaylan [facility] MD at [facility] OR 2ND FLR    AMPUTATION, LOWER LIMB  2012    right BKA    FASCIOTOMY-COMPARTMENT Left 12/9/2015    Performed by Kaylan Valencia MD at [facility] OR 2ND FLR    INCISION-DRAINAGE-HEMATOMA Left 12/30/2015    Performed by Kaylan Valencia MD at [facility] OR 2ND FLR    KNEE SURGERY      TIBIAL FASCIOTOMY Right 12/08/2015    VENOGRAM Left 3/3/2016    Performed by Filemon Goddard MD at [facility] CATH LAB       Review of patient's allergies indicates:  No Known Allergies    No current facility-administered medications on file prior to encounter.      Current Outpatient Medications on File Prior to Encounter   Medication Sig    alprazolam (XANAX) 0.5 MG tablet Take 0.5 mg by mouth 3 (three) times daily as needed for Anxiety.     amlodipine (NORVASC) 10 MG tablet Take 1 tablet (10 mg total) by mouth once daily.    atorvastatin (LIPITOR) 40 MG tablet Take 40 mg by mouth once daily.    diclofenac (VOLTAREN) 50 MG EC tablet Take 1 tablet (50 mg total) by mouth 2 (two) times daily.    duloxetine (CYMBALTA) 60 MG capsule Take 60 mg by mouth once daily.    gabapentin (NEURONTIN) 300 MG capsule Take 2 capsules (600 mg total) by mouth 3 (three) times daily. Take one tablet by mouth 3 to 4 times daily    hydrocodone-acetaminophen 5-325mg (NORCO) 5-325 mg per tablet Take 1 tablet by mouth every 8 (eight) hours as needed.    ibuprofen (ADVIL,MOTRIN) 200 MG tablet Take 2 tablets (400 mg total) by mouth every 6 (six) hours as needed for Pain.    INV LISINOPRIL-HCTZ 20-12.5 Take 1 tablet by mouth once daily. For investigational use only.    naproxen (NAPROSYN) 500 MG tablet Take 500 mg by mouth 2 (two) times daily.    traMADol (ULTRAM) 50 mg tablet Take 1 tablet (50 mg total) by mouth every 6 (six) hours as needed for Pain.     Family  History     None        Tobacco Use    Smoking status: Current Every Day Smoker     Packs/day: 1.00     Types: Cigarettes    Smokeless tobacco: Never Used   Substance and Sexual Activity    Alcohol use: Yes     Comment: occas    Drug use: No    Sexual activity: Not on file     Review of Systems   Constitutional: Negative for activity change, appetite change, chills, diaphoresis, fatigue, fever and unexpected weight change.   HENT: Negative for congestion, rhinorrhea, sore throat, trouble swallowing and voice change.    Eyes: Negative for visual disturbance.   Respiratory: Negative for cough, choking, chest tightness, shortness of breath and wheezing.    Cardiovascular: Negative for chest pain, palpitations and leg swelling.   Gastrointestinal: Negative for abdominal distention, abdominal pain, anal bleeding, blood in stool, constipation, diarrhea, nausea and vomiting.   Endocrine: Negative for cold intolerance, heat intolerance, polydipsia and polyuria.   Genitourinary: Negative for decreased urine volume, dysuria, flank pain, frequency, hematuria and urgency.   Musculoskeletal: Positive for arthralgias (RLE) and myalgias (RLE). Negative for back pain and joint swelling.   Skin: Positive for color change (RLE). Negative for rash.   Neurological: Negative for dizziness, seizures, syncope, facial asymmetry, speech difficulty, weakness, light-headedness, numbness and headaches.   Hematological: Negative for adenopathy. Does not bruise/bleed easily.   Psychiatric/Behavioral: Negative for agitation, confusion, hallucinations and suicidal ideas.     Objective:     Vital Signs (Most Recent):  Temp: 97.9 °F (36.6 °C) (02/04/19 2000)  Pulse: 82 (02/05/19 0030)  Resp: 20 (02/05/19 0030)  BP: (!) 140/74 (02/05/19 0030)  SpO2: 99 % (02/05/19 0030) Vital Signs (24h Range):  Temp:  [97.8 °F (36.6 °C)-98.5 °F (36.9 °C)] 97.9 °F (36.6 °C)  Pulse:  [] 82  Resp:  [16-20] 20  SpO2:  [97 %-99 %] 99 %  BP: (140-176)/(74-99)  140/74     Weight: 90.7 kg (200 lb)  Body mass index is 29.53 kg/m².    Physical Exam   Constitutional: He is oriented to person, place, and time. He appears well-developed and well-nourished. No distress.   HENT:   Head: Normocephalic and atraumatic.   Eyes: Pupils are equal, round, and reactive to light.   Neck: Neck supple. Carotid bruit is not present. No thyromegaly present.   Cardiovascular: Normal rate and regular rhythm. Exam reveals no gallop.   No murmur heard.  Pulmonary/Chest: Effort normal and breath sounds normal. No respiratory distress. He has no wheezes.   Abdominal: Bowel sounds are normal. He exhibits no distension. There is no splenomegaly or hepatomegaly. There is no tenderness.   Musculoskeletal: Normal range of motion. He exhibits no edema.   S/p right BKA now with pain to palpation of R stump, erythema to mid thigh, 2+ femoral pulses, no evidence of trauma, skin cool to touch   Neurological: He is alert and oriented to person, place, and time. No cranial nerve deficit or sensory deficit.   Skin: Skin is warm and dry. No rash noted.   Psychiatric: He has a normal mood and affect. His behavior is normal.         CRANIAL NERVES     CN III, IV, VI   Pupils are equal, round, and reactive to light.       Significant Labs:   Blood Culture: No results for input(s): LABBLOO in the last 48 hours.  CBC:   Recent Labs   Lab 02/04/19  1640 02/05/19  0159   WBC 11.91 8.00   HGB 16.1 11.4*   HCT 46.6 34.4*    193     CMP:   Recent Labs   Lab 02/04/19  1640      K 3.7      CO2 23   GLU 89   BUN 12   CREATININE 0.7   CALCIUM 9.9   PROT 7.9   ALBUMIN 4.0   BILITOT 0.4   ALKPHOS 97   AST 21   ALT 10   ANIONGAP 9   EGFRNONAA >60.0     Coagulation:   Recent Labs   Lab 02/05/19  0159   INR 0.9   APTT 30.2       Significant Imaging: I have reviewed all pertinent imaging results/findings within the past 24 hours.    Assessment/Plan:     * Vascular complication    Leg erythema  History of right  below knee amputation    - ESR 43, CRP 8.7, .  - UA RLE arteries and veins unremarkable.  - CTA RLE showed extensive atherosclerosis resulting in focal narrowing at the proximal aspect of the right popliteal artery likely representing 50% stenosis.  No vascular flow visualized beyond the proximal anterior tibial artery; uncertain if this represents normal postoperative vascular morphology given lack of prior imaging.  - Appreciate vascular surgery's assistance.  Recommendations pending.  - Patient discussed with attending.  Will make NPO and order heparin gtt.  - Supportive care and close monitoring.     Tobacco abuse    - Nicoderm patch.  - Complete cessation recommended.       Anxiety and depression    - Continue Cymbalta 60mg daily and PRN Xanax 0.5mg TID PRN.       HLD (hyperlipidemia)    - Continue Lipitor 40mg daily.       Essential hypertension    - Continue amlodipine 10mg daily, HCTZ 12.5mg daily, lisinopril 20mg daily.         VTE Risk Mitigation (From admission, onward)        Ordered     heparin 25,000 units in dextrose 5% (100 units/ml) IV bolus from bag INITIAL BOLUS  Once      02/05/19 0143     heparin 25,000 units in dextrose 5% 250 mL (100 units/mL) infusion HIGH INTENSITY nomogram - [facility]  Continuous      02/05/19 0143     heparin 25,000 units in dextrose 5% (100 units/ml) IV bolus from bag - ADDITIONAL PRN BOLUS - 60 units/kg  As needed (PRN)      02/05/19 0143     heparin 25,000 units in dextrose 5% (100 units/ml) IV bolus from bag - ADDITIONAL PRN BOLUS - 30 units/kg  As needed (PRN)      02/05/19 0143             MARISSA LimaC  Department of Hospital Medicine   [facility]

## 2019-02-05 NOTE — ED NOTES
Assumed care for this patient, pt resting in stretcher with family at bedside, pt reports increasing pain, awaiting orders for pain medicine. Pt and family updated on POC, verbalizes understanding.    Patient Identifiers for Ren Grove checked and correct  LOC: The patient is awake, alert and aware of environment with an appropriate affect, the patient is oriented x 3 and speaking appropriate.  APPEARANCE: Patient resting comfortably and in no acute distress, patient is clean and well groomed, patient's clothing is properly fastened.  SKIN: The skin is warm and dry, patient has normal skin turgor and moist mucus membranes,no rashes or lesions.Skin Intact , No Breakdown Noted  Musculoskeletal :  Normal range of motion noted. Moves all extremeties well, pt has R BKA from 2012, redness and purple petechiae noted to extremity, redness stops at the area of patient's thigh where prosthetic sleeve ends. Extremity feels cool to touch  RESPIRATORY: Airway is open and patent, respirations are spontaneous, patient has a normal effort and rate   CARDIAC: Patient has a normal rate and rhythm, no periphreal edema noted, capillary refill < 3 seconds.   ABDOMEN: Soft and non tender to palpation, no distention noted.   PULSES: 2+  And symmetrical in all extremeties  NEUROLOGIC: PERRL. facial expression is symmetrical, patient moving all extremities, normal sensation in all extremities when touched with a finger.The patient is awake, alert and cooperative with a calm affect, patient is aware of environment.    Will continue to monitor

## 2019-02-05 NOTE — HPI
Ren Grove is a 38 y.o. male with h/o right BKA following DVT in 2012 and left compartment syndrome who presents with acute pain of BKA stump which started last night and is worsening. Reports color change which is sharply demarcated at the level where his prosthetic liner stops. The stump is cold and there is pain to palpation, especially at the distal aspect of the stump and behind the knee. He denies any trauma to the area. No fevers or chills. On admission, he is hemodynamically stable with no leukocytosis. Slight elevation in sed rate and CRP. US showed no stenosis or evidence of thrombosis.   Of note, patient has had similar symptoms in 2015 when he was last seen by Vascular Surgery. He presented with similar demarcation of his left leg (photos in media) and this resulted in fasciotomy. Rheumatology was also involved at that time to work up for autoimmune or vasculitis. Autoimmune work up was negative, skin biopsy showed non-inflammatory purpura not consistent with vasculitis.

## 2019-02-05 NOTE — ED NOTES
Pt resting comfortably and independently repositioned in stretcher with bed locked in lowest position for safety. NAD and patient denies pain at this time. Respirations even and unlabored and visible chest rise noted. Patient offered bathroom assistance and denies need at this time. Pt instructed to call if assistance is needed. Pt on  cardiac, BP, and O2 monitoring. No needs at this time. Will continue to monitor.     Family at bedside.

## 2019-02-05 NOTE — HPI
Patient is a 38 year old gentleman with a h/o R BKA following DVT in 2012 and L compartment syndrome, HTN, and anxiety.  He presents with pain, coldness, and erythema to his right stump.  Pain began Sunday night and continued to worsen the following day.  Color change is consistent with area of contact with his prostetic liner.  It has not spread past demarcation.  Pain is worse with palpation and movement.  Denies trauma and is otherwise in his usual state of health.  Denies chest pain, SOB, dizziness, palpitaitons, fever/chills, N/V/D.    ED Course:  CRP, ESR, CPK elevated.  US of arteries and veins of RLE was unremarkable.  Patient was seen by Vascular surgery, who recommended CTA of lower extremity.  This showed extensive atherosclerosis resulting in focal narrowing at the proximal aspect of the right popliteal artery likely representing 50% stenosis.  No vascular flow visualized beyond the proximal anterior tibial artery; uncertain if this represents normal postoperative vascular morphology given lack of prior imaging.

## 2019-02-05 NOTE — ASSESSMENT & PLAN NOTE
Leg erythema  History of right below knee amputation    - ESR 43, CRP 8.7, .  - UA RLE arteries and veins unremarkable.  - CTA RLE showed extensive atherosclerosis resulting in focal narrowing at the proximal aspect of the right popliteal artery likely representing 50% stenosis.  No vascular flow visualized beyond the proximal anterior tibial artery; uncertain if this represents normal postoperative vascular morphology given lack of prior imaging.  - Appreciate vascular surgery's assistance.  Recommendations pending.  - Patient discussed with attending.  Will make NPO and order heparin gtt.  - Supportive care and close monitoring.

## 2019-02-05 NOTE — CONSULTS
[facility]  Vascular Surgery  Consult Note    Consults  Subjective:     Chief Complaint/Reason for Admission: cold, painful BKA stump    History of Present Illness: Ren Grove is a 38 y.o. male with h/o right BKA following DVT in 2012 and left compartment syndrome who presents with acute pain of BKA stump which started last night and is worsening. Reports color change which is sharply demarcated at the level where his prosthetic liner stops. The stump is cold and there is pain to palpation, especially at the distal aspect of the stump and behind the knee. He denies any trauma to the area. No fevers or chills. On admission, he is hemodynamically stable with no leukocytosis. Slight elevation in sed rate and CRP. US showed no stenosis or evidence of thrombosis.   Of note, patient has had similar symptoms in 2015 when he was last seen by Vascular Surgery. He presented with similar demarcation of his left leg (photos in media) and this resulted in fasciotomy. Rheumatology was also involved at that time to work up for autoimmune or vasculitis. Autoimmune work up was negative, skin biopsy showed non-inflammatory purpura not consistent with vasculitis.           (Not in a hospital admission)    Review of patient's allergies indicates:  No Known Allergies    Past Medical History:   Diagnosis Date    Amputation of lower limb     left below the knee    Compartment syndrome of left lower extremity     Compartment syndrome of right lower extremity     Depression 3/18/2016    DVT (deep venous thrombosis)     Encounter for cholesteral screening for cardiovascular disease     Hypertension     Tachycardia      Past Surgical History:   Procedure Laterality Date     Fasciotomy closure Left 12/11/2015    [facility]    AMPUTATION, LOWER LIMB  2012    right BKA    FASCIOTOMY-COMPARTMENT Left 12/9/2015    [facility]     INCISION-DRAINAGE-HEMATOMA Left 12/30/2015    Per[facility] 2ND FLR    KNEE SURGERY      TIBIAL FASCIOTOMY Right 12/08/2015    VENOGRAM Left 3/3/2016    Performed by Filemon [facility]ard [facility] at [facility] CATH LAB     Family History     None        Tobacco Use    Smoking status: Current Every Day Smoker     Packs/day: 1.00     Types: Cigarettes    Smokeless tobacco: Never Used   Substance and Sexual Activity    Alcohol use: Yes     Comment: occas    Drug use: No    Sexual activity: Not on file     Review of Systems   Constitutional: Negative for chills and fever.   Respiratory: Negative for cough and shortness of breath.    Cardiovascular: Negative for chest pain and palpitations.   Gastrointestinal: Negative for abdominal distention and abdominal pain.   Neurological: Negative for dizziness and headaches.   Psychiatric/Behavioral: Negative for agitation and confusion.     Objective:     Vital Signs (Most Recent):  Temp: 98.5 °F (36.9 °C) (02/04/19 1758)  Pulse: 98 (02/04/19 1758)  Resp: 16 (02/04/19 1758)  BP: (!) 143/97 (02/04/19 1758)  SpO2: 97 % (02/04/19 1758) Vital Signs (24h Range):  Temp:  [97.8 °F (36.6 °C)-98.5 °F (36.9 °C)] 98.5 °F (36.9 °C)  Pulse:  [] 98  Resp:  [16-18] 16  SpO2:  [97 %] 97 %  BP: (143-176)/(97-99) 143/97     Weight: 90.7 kg (200 lb)  Body mass index is 29.53 kg/m².    Physical Exam   Constitutional: He is oriented to person, place, and time. He appears well-developed and well-nourished. No distress.   Cardiovascular: Normal rate.   Pulmonary/Chest: Effort normal. No respiratory distress.   Musculoskeletal:   Right BKA tender to palpation, well demarcated erythema extending to mid thigh. Stump cool to touch. 2+ femoral pulses.  Left leg with well healed fasciotomy scars   Neurological: He is alert and oriented to person, place, and time.       Significant Labs:  CBC:   Recent Labs   Lab 02/04/19  1640   WBC 11.91   RBC 5.21   HGB 16.1   HCT 46.6   PLT  268   MCV 89   MCH 30.9   MCHC 34.5     CMP:   Recent Labs   Lab 02/04/19  1640   GLU 89   CALCIUM 9.9   ALBUMIN 4.0   PROT 7.9      K 3.7   CO2 23      BUN 12   CREATININE 0.7   ALKPHOS 97   ALT 10   AST 21   BILITOT 0.4       Significant Diagnostics:  I have reviewed all pertinent imaging results/findings within the past 24 hours.    Assessment/Plan:     Leg erythema    Similar presentation to 2015, cool leg with strict demarcation of erythema at mid thigh, no leukocytosis. No evidence of DVT on doppler.     - CTA lower extremity, further recs to follow pending results   - Discussed smoking cessation             Ana Rosa Scherer MD  Vascular Surgery  [facility]

## 2019-02-06 LAB
ALBUMIN SERPL BCP-MCNC: 3.7 G/DL
ALP SERPL-CCNC: 82 U/L
ALT SERPL W/O P-5'-P-CCNC: 12 U/L
ANION GAP SERPL CALC-SCNC: 8 MMOL/L
ANISOCYTOSIS BLD QL SMEAR: SLIGHT
ANISOCYTOSIS BLD QL SMEAR: SLIGHT
APTT BLDCRRT: 32.5 SEC
APTT BLDCRRT: 40.1 SEC
APTT BLDCRRT: 92.7 SEC
AST SERPL-CCNC: 22 U/L
BASOPHILS # BLD AUTO: 0.06 K/UL
BASOPHILS # BLD AUTO: 0.08 K/UL
BASOPHILS # BLD AUTO: 0.08 K/UL
BASOPHILS NFR BLD: 0.8 %
BASOPHILS NFR BLD: 1 %
BASOPHILS NFR BLD: 1 %
BILIRUB SERPL-MCNC: 0.7 MG/DL
BUN SERPL-MCNC: 13 MG/DL
CALCIUM SERPL-MCNC: 9.4 MG/DL
CHLORIDE SERPL-SCNC: 106 MMOL/L
CO2 SERPL-SCNC: 22 MMOL/L
CREAT SERPL-MCNC: 0.8 MG/DL
DIFFERENTIAL METHOD: ABNORMAL
DIFFERENTIAL METHOD: ABNORMAL
DIFFERENTIAL METHOD: NORMAL
EOSINOPHIL # BLD AUTO: 0.3 K/UL
EOSINOPHIL NFR BLD: 3.4 %
EOSINOPHIL NFR BLD: 3.4 %
EOSINOPHIL NFR BLD: 3.7 %
ERYTHROCYTE [DISTWIDTH] IN BLOOD BY AUTOMATED COUNT: 12.8 %
ERYTHROCYTE [DISTWIDTH] IN BLOOD BY AUTOMATED COUNT: 12.8 %
ERYTHROCYTE [DISTWIDTH] IN BLOOD BY AUTOMATED COUNT: 12.9 %
EST. GFR  (AFRICAN AMERICAN): >60 ML/MIN/1.73 M^2
EST. GFR  (NON AFRICAN AMERICAN): >60 ML/MIN/1.73 M^2
GLUCOSE SERPL-MCNC: 88 MG/DL
HCT VFR BLD AUTO: 45.9 %
HCT VFR BLD AUTO: 46 %
HCT VFR BLD AUTO: 46 %
HGB BLD-MCNC: 14.9 G/DL
HYPOCHROMIA BLD QL SMEAR: ABNORMAL
HYPOCHROMIA BLD QL SMEAR: ABNORMAL
IMM GRANULOCYTES # BLD AUTO: 0.02 K/UL
IMM GRANULOCYTES NFR BLD AUTO: 0.2 %
IMM GRANULOCYTES NFR BLD AUTO: 0.2 %
IMM GRANULOCYTES NFR BLD AUTO: 0.3 %
INR PPP: 1
LYMPHOCYTES # BLD AUTO: 3.2 K/UL
LYMPHOCYTES # BLD AUTO: 4 K/UL
LYMPHOCYTES # BLD AUTO: 4 K/UL
LYMPHOCYTES NFR BLD: 44.2 %
LYMPHOCYTES NFR BLD: 48.4 %
LYMPHOCYTES NFR BLD: 48.4 %
MCH RBC QN AUTO: 29.4 PG
MCH RBC QN AUTO: 29.6 PG
MCH RBC QN AUTO: 29.6 PG
MCHC RBC AUTO-ENTMCNC: 32.4 G/DL
MCHC RBC AUTO-ENTMCNC: 32.4 G/DL
MCHC RBC AUTO-ENTMCNC: 32.5 G/DL
MCV RBC AUTO: 91 FL
MCV RBC AUTO: 92 FL
MCV RBC AUTO: 92 FL
MONOCYTES # BLD AUTO: 0.7 K/UL
MONOCYTES NFR BLD: 10 %
MONOCYTES NFR BLD: 8.4 %
MONOCYTES NFR BLD: 8.4 %
NEUTROPHILS # BLD AUTO: 3 K/UL
NEUTROPHILS # BLD AUTO: 3.2 K/UL
NEUTROPHILS # BLD AUTO: 3.2 K/UL
NEUTROPHILS NFR BLD: 38.6 %
NEUTROPHILS NFR BLD: 38.6 %
NEUTROPHILS NFR BLD: 41 %
NRBC BLD-RTO: 0 /100 WBC
OVALOCYTES BLD QL SMEAR: ABNORMAL
OVALOCYTES BLD QL SMEAR: ABNORMAL
PLATELET # BLD AUTO: 246 K/UL
PLATELET # BLD AUTO: 247 K/UL
PLATELET # BLD AUTO: 247 K/UL
PMV BLD AUTO: 10.1 FL
PMV BLD AUTO: 10.5 FL
PMV BLD AUTO: 10.5 FL
POIKILOCYTOSIS BLD QL SMEAR: SLIGHT
POIKILOCYTOSIS BLD QL SMEAR: SLIGHT
POLYCHROMASIA BLD QL SMEAR: ABNORMAL
POLYCHROMASIA BLD QL SMEAR: ABNORMAL
POTASSIUM SERPL-SCNC: 4 MMOL/L
PROT SERPL-MCNC: 7.2 G/DL
PROTHROMBIN TIME: 10.2 SEC
RBC # BLD AUTO: 5.03 M/UL
RBC # BLD AUTO: 5.03 M/UL
RBC # BLD AUTO: 5.07 M/UL
SODIUM SERPL-SCNC: 136 MMOL/L
WBC # BLD AUTO: 7.33 K/UL
WBC # BLD AUTO: 8.33 K/UL
WBC # BLD AUTO: 8.33 K/UL

## 2019-02-06 PROCEDURE — 85025 COMPLETE CBC W/AUTO DIFF WBC: CPT

## 2019-02-06 PROCEDURE — S4991 NICOTINE PATCH NONLEGEND: HCPCS | Performed by: PHYSICIAN ASSISTANT

## 2019-02-06 PROCEDURE — 25000003 PHARM REV CODE 250: Performed by: PHYSICIAN ASSISTANT

## 2019-02-06 PROCEDURE — 63600175 PHARM REV CODE 636 W HCPCS: Performed by: PHYSICIAN ASSISTANT

## 2019-02-06 PROCEDURE — 99232 SBSQ HOSP IP/OBS MODERATE 35: CPT | Mod: ,,, | Performed by: INTERNAL MEDICINE

## 2019-02-06 PROCEDURE — 80053 COMPREHEN METABOLIC PANEL: CPT

## 2019-02-06 PROCEDURE — 25000003 PHARM REV CODE 250: Performed by: INTERNAL MEDICINE

## 2019-02-06 PROCEDURE — 85610 PROTHROMBIN TIME: CPT

## 2019-02-06 PROCEDURE — 99232 PR SUBSEQUENT HOSPITAL CARE,LEVL II: ICD-10-PCS | Mod: ,,, | Performed by: INTERNAL MEDICINE

## 2019-02-06 PROCEDURE — 11000001 HC ACUTE MED/SURG PRIVATE ROOM

## 2019-02-06 PROCEDURE — 85730 THROMBOPLASTIN TIME PARTIAL: CPT | Mod: 91

## 2019-02-06 PROCEDURE — 36415 COLL VENOUS BLD VENIPUNCTURE: CPT

## 2019-02-06 RX ORDER — IBUPROFEN 200 MG
1 TABLET ORAL DAILY
Status: DISCONTINUED | OUTPATIENT
Start: 2019-02-06 | End: 2019-02-15 | Stop reason: HOSPADM

## 2019-02-06 RX ORDER — ACETAMINOPHEN 500 MG
1000 TABLET ORAL ONCE
Status: COMPLETED | OUTPATIENT
Start: 2019-02-06 | End: 2019-02-06

## 2019-02-06 RX ADMIN — Medication 2 MG: at 12:02

## 2019-02-06 RX ADMIN — ATORVASTATIN CALCIUM 40 MG: 10 TABLET, FILM COATED ORAL at 09:02

## 2019-02-06 RX ADMIN — ACETAMINOPHEN 1000 MG: 500 TABLET ORAL at 05:02

## 2019-02-06 RX ADMIN — OXYCODONE HYDROCHLORIDE 5 MG: 5 TABLET ORAL at 09:02

## 2019-02-06 RX ADMIN — ASPIRIN 81 MG CHEWABLE TABLET 81 MG: 81 TABLET CHEWABLE at 09:02

## 2019-02-06 RX ADMIN — GABAPENTIN 600 MG: 100 CAPSULE ORAL at 03:02

## 2019-02-06 RX ADMIN — Medication 2 MG: at 05:02

## 2019-02-06 RX ADMIN — Medication 2 MG: at 10:02

## 2019-02-06 RX ADMIN — HEPARIN SODIUM 18 UNITS/KG/HR: 10000 INJECTION, SOLUTION INTRAVENOUS at 12:02

## 2019-02-06 RX ADMIN — DULOXETINE 60 MG: 60 CAPSULE, DELAYED RELEASE ORAL at 09:02

## 2019-02-06 RX ADMIN — GABAPENTIN 600 MG: 100 CAPSULE ORAL at 08:02

## 2019-02-06 RX ADMIN — GABAPENTIN 600 MG: 100 CAPSULE ORAL at 09:02

## 2019-02-06 RX ADMIN — STANDARDIZED SENNA CONCENTRATE AND DOCUSATE SODIUM 1 TABLET: 8.6; 5 TABLET, FILM COATED ORAL at 09:02

## 2019-02-06 RX ADMIN — HEPARIN SODIUM 14 UNITS/KG/HR: 10000 INJECTION, SOLUTION INTRAVENOUS at 05:02

## 2019-02-06 RX ADMIN — OXYCODONE HYDROCHLORIDE 5 MG: 5 TABLET ORAL at 03:02

## 2019-02-06 RX ADMIN — NAPROXEN 500 MG: 250 TABLET ORAL at 09:02

## 2019-02-06 RX ADMIN — HEPARIN SODIUM 14 UNITS/KG/HR: 10000 INJECTION, SOLUTION INTRAVENOUS at 09:02

## 2019-02-06 RX ADMIN — NAPROXEN 500 MG: 250 TABLET ORAL at 08:02

## 2019-02-06 RX ADMIN — WARFARIN SODIUM 7.5 MG: 2.5 TABLET ORAL at 05:02

## 2019-02-06 RX ADMIN — LISINOPRIL 20 MG: 20 TABLET ORAL at 09:02

## 2019-02-06 RX ADMIN — Medication 2 MG: at 11:02

## 2019-02-06 RX ADMIN — STANDARDIZED SENNA CONCENTRATE AND DOCUSATE SODIUM 1 TABLET: 8.6; 5 TABLET, FILM COATED ORAL at 08:02

## 2019-02-06 RX ADMIN — NICOTINE 1 PATCH: 21 PATCH, EXTENDED RELEASE TRANSDERMAL at 02:02

## 2019-02-06 RX ADMIN — HYDROCHLOROTHIAZIDE 12.5 MG: 12.5 TABLET ORAL at 09:02

## 2019-02-06 RX ADMIN — AMLODIPINE BESYLATE 10 MG: 10 TABLET ORAL at 09:02

## 2019-02-06 NOTE — PROGRESS NOTES
Hospital Medicine   Progress note   Team: [facility] [place] [facility] O Letty Todd MD   Admit Date: 2/4/2019   COLEEN 2/8/2019   Code status: Full Code   Principal Problem: Vascular complication     Interval hx:  Heparin drip stopped yesterday per vascular recs but overnight leg was cold and heparin restarted.  D/w vascular this am and still do not recommend surgical intervention, will need anticoagulation.  Leg warm this am.      ROS   Constitutional: Negative for chills, fatigue, fever.   HENT: Negative for sore throat, trouble swallowing.    Eyes: Negative for photophobia, visual disturbance.   Respiratory: Negative for cough, shortness of breath.    Cardiovascular: Negative for chest pain, palpitations, leg swelling.   Gastrointestinal: Negative for abdominal pain, constipation, diarrhea, nausea, vomiting.   Endocrine: Negative for cold intolerance, heat intolerance.   Genitourinary: Negative for dysuria, frequency.   Musculoskeletal: + arthralgias, myalgias (RLE)  Skin: Negative for rash, erythema , +skin irritation in RLE  Neurological: Negative for dizziness, syncope, weakness, light-headedness.   Psychiatric/Behavioral: Negative for confusion, hallucinations, anxiety  All other systems reviewed and are negative.    PEx   Temp:  [96 °F (35.6 °C)-98.5 °F (36.9 °C)]   Pulse:  []   Resp:  [16-20]   BP: (127-177)/()   SpO2:  [94 %-100 %]      I & O (Last 24H):     Intake/Output Summary (Last 24 hours) at 2/6/2019 0751  Last data filed at 2/5/2019 1736  Gross per 24 hour   Intake 1000 ml   Output --   Net 1000 ml       General appearance: no distress, alert and oriented x 3  HEENT:  conjunctivae/corneas clear, PERRL, mucous membranes moist   Neck: supple, thyroid not enlarged  Pulm:   normal respiratory effort, CTAB, no wheezes, rales or rhonchi  Card: RRR, S1, S2 normal, no murmur, click, rub or gallop  Abd: soft, NT, ND, BS present; no masses, no organomegaly  Ext: R BKA with warmth noted and mild erythema, still  less warm then LLE but improving, mild skin irritation at the base around the site where prosthesis attaches  Pulses: 2+, symmetric  Skin: color, texture, turgor normal. No rashes or lesions  Neuro: CN II-XII grossly intact, no focal numbness or weakness, normal strength and tone   Psych: normal mood and affect      Recent Results (from the past 24 hour(s))   APTT    Collection Time: 02/05/19  8:04 AM   Result Value Ref Range    aPTT 85.8 (H) 21.0 - 32.0 sec   APTT    Collection Time: 02/05/19  9:49 AM   Result Value Ref Range    aPTT 75.9 (H) 21.0 - 32.0 sec   APTT    Collection Time: 02/05/19  5:53 PM   Result Value Ref Range    aPTT 31.1 21.0 - 32.0 sec   APTT    Collection Time: 02/05/19 11:58 PM   Result Value Ref Range    aPTT 32.5 (H) 21.0 - 32.0 sec   CBC auto differential    Collection Time: 02/05/19 11:58 PM   Result Value Ref Range    WBC 7.33 3.90 - 12.70 K/uL    RBC 5.07 4.60 - 6.20 M/uL    Hemoglobin 14.9 14.0 - 18.0 g/dL    Hematocrit 45.9 40.0 - 54.0 %    MCV 91 82 - 98 fL    MCH 29.4 27.0 - 31.0 pg    MCHC 32.5 32.0 - 36.0 g/dL    RDW 12.9 11.5 - 14.5 %    Platelets 246 150 - 350 K/uL    MPV 10.1 9.2 - 12.9 fL    Immature Granulocytes 0.3 0.0 - 0.5 %    Gran # (ANC) 3.0 1.8 - 7.7 K/uL    Immature Grans (Abs) 0.02 0.00 - 0.04 K/uL    Lymph # 3.2 1.0 - 4.8 K/uL    Mono # 0.7 0.3 - 1.0 K/uL    Eos # 0.3 0.0 - 0.5 K/uL    Baso # 0.06 0.00 - 0.20 K/uL    nRBC 0 0 /100 WBC    Gran% 41.0 38.0 - 73.0 %    Lymph% 44.2 18.0 - 48.0 %    Mono% 10.0 4.0 - 15.0 %    Eosinophil% 3.7 0.0 - 8.0 %    Basophil% 0.8 0.0 - 1.9 %    Differential Method Automated    Comprehensive Metabolic Panel (CMP)    Collection Time: 02/06/19  5:35 AM   Result Value Ref Range    Sodium 136 136 - 145 mmol/L    Potassium 4.0 3.5 - 5.1 mmol/L    Chloride 106 95 - 110 mmol/L    CO2 22 (L) 23 - 29 mmol/L    Glucose 88 70 - 110 mg/dL    BUN, Bld 13 6 - 20 mg/dL    Creatinine 0.8 0.5 - 1.4 mg/dL    Calcium 9.4 8.7 - 10.5 mg/dL    Total  Protein 7.2 6.0 - 8.4 g/dL    Albumin 3.7 3.5 - 5.2 g/dL    Total Bilirubin 0.7 0.1 - 1.0 mg/dL    Alkaline Phosphatase 82 55 - 135 U/L    AST 22 10 - 40 U/L    ALT 12 10 - 44 U/L    Anion Gap 8 8 - 16 mmol/L    eGFR if African American >60.0 >60 mL/min/1.73 m^2    eGFR if non African American >60.0 >60 mL/min/1.73 m^2   CBC with Automated Differential    Collection Time: 02/06/19  5:35 AM   Result Value Ref Range    WBC 8.33 3.90 - 12.70 K/uL    RBC 5.03 4.60 - 6.20 M/uL    Hemoglobin 14.9 14.0 - 18.0 g/dL    Hematocrit 46.0 40.0 - 54.0 %    MCV 92 82 - 98 fL    MCH 29.6 27.0 - 31.0 pg    MCHC 32.4 32.0 - 36.0 g/dL    RDW 12.8 11.5 - 14.5 %    Platelets 247 150 - 350 K/uL    MPV 10.5 9.2 - 12.9 fL    Immature Granulocytes 0.2 0.0 - 0.5 %    Gran # (ANC) 3.2 1.8 - 7.7 K/uL    Immature Grans (Abs) 0.02 0.00 - 0.04 K/uL    Lymph # 4.0 1.0 - 4.8 K/uL    Mono # 0.7 0.3 - 1.0 K/uL    Eos # 0.3 0.0 - 0.5 K/uL    Baso # 0.08 0.00 - 0.20 K/uL    nRBC 0 0 /100 WBC    Gran% 38.6 38.0 - 73.0 %    Lymph% 48.4 (H) 18.0 - 48.0 %    Mono% 8.4 4.0 - 15.0 %    Eosinophil% 3.4 0.0 - 8.0 %    Basophil% 1.0 0.0 - 1.9 %    Aniso Slight     Poik Slight     Poly Occasional     Hypo Occasional     Ovalocytes Occasional     Differential Method Automated    CBC auto differential    Collection Time: 02/06/19  5:35 AM   Result Value Ref Range    WBC 8.33 3.90 - 12.70 K/uL    RBC 5.03 4.60 - 6.20 M/uL    Hemoglobin 14.9 14.0 - 18.0 g/dL    Hematocrit 46.0 40.0 - 54.0 %    MCV 92 82 - 98 fL    MCH 29.6 27.0 - 31.0 pg    MCHC 32.4 32.0 - 36.0 g/dL    RDW 12.8 11.5 - 14.5 %    Platelets 247 150 - 350 K/uL    MPV 10.5 9.2 - 12.9 fL    Immature Granulocytes 0.2 0.0 - 0.5 %    Gran # (ANC) 3.2 1.8 - 7.7 K/uL    Immature Grans (Abs) 0.02 0.00 - 0.04 K/uL    Lymph # 4.0 1.0 - 4.8 K/uL    Mono # 0.7 0.3 - 1.0 K/uL    Eos # 0.3 0.0 - 0.5 K/uL    Baso # 0.08 0.00 - 0.20 K/uL    nRBC 0 0 /100 WBC    Gran% 38.6 38.0 - 73.0 %    Lymph% 48.4 (H) 18.0 - 48.0 %     Mono% 8.4 4.0 - 15.0 %    Eosinophil% 3.4 0.0 - 8.0 %    Basophil% 1.0 0.0 - 1.9 %    Aniso Slight     Poik Slight     Poly Occasional     Hypo Occasional     Ovalocytes Occasional     Differential Method Automated    APTT    Collection Time: 02/06/19  5:35 AM   Result Value Ref Range    aPTT 92.7 (H) 21.0 - 32.0 sec       No results for input(s): POCTGLUCOSE in the last 168 hours.    Hemoglobin A1C   Date Value Ref Range Status   02/05/2019 5.1 4.0 - 5.6 % Final     Comment:     ADA Screening Guidelines:  5.7-6.4%  Consistent with prediabetes  >or=6.5%  Consistent with diabetes  High levels of fetal hemoglobin interfere with the HbA1C  assay. Heterozygous hemoglobin variants (HbS, HgC, etc)do  not significantly interfere with this assay.   However, presence of multiple variants may affect accuracy.     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         amLODIPine  10 mg Oral Daily    aspirin  81 mg Oral Daily    atorvastatin  40 mg Oral Daily    DULoxetine  60 mg Oral Daily    enoxaparin  40 mg Subcutaneous Daily    gabapentin  600 mg Oral TID    hydroCHLOROthiazide  12.5 mg Oral Daily    lisinopril  20 mg Oral Daily    naproxen  500 mg Oral BID    nicotine  1 patch Transdermal Daily    senna-docusate 8.6-50 mg  1 tablet Oral BID     acetaminophen, albuterol-ipratropium, ALPRAZolam, dextrose 50%, dextrose 50%, glucagon (human recombinant), glucose, glucose, heparin (PORCINE), heparin (PORCINE), morphine, ondansetron, oxyCODONE, promethazine, ramelteon,  sodium chloride 0.9%, sodium chloride 0.9%      Assessment and Plan for problems addressed today:   1. Vascular complication with history of right below knee amputation  - ESR 43, CRP 8.7, .  - UA RLE arteries and veins unremarkable.  - CTA RLE showed extensive atherosclerosis resulting in focal narrowing at the proximal aspect of the right popliteal artery likely representing 50% stenosis.  No vascular flow visualized beyond the proximal anterior tibial artery; uncertain if this represents normal postoperative vascular morphology given lack of prior imaging, good flow per vascular sx review  - Appreciate vascular surgery's assistance. No acute surgical intervention planned  - continue heparin drip, will start coumadin tonight  - consult pharm to assist with coumadin dosing       2. Tobacco abuse  - Nicoderm patch.  - Counseled on cessation      3. Anxiety and depression  - Continue Cymbalta 60mg daily and PRN Xanax 0.5mg TID PRN.     4. HLD (hyperlipidemia)  - Continue Lipitor 40mg daily.     5. Essential hypertension  - Continue amlodipine 10mg daily, HCTZ 12.5mg daily, lisinopril 20mg daily.           Diet: cardiac  DVT PPx: heparin   Code status: FULL     Discharge plan and follow up:  Continue heparin drip, start coumadin tonight,will need to await until therpeutic, vascular to re-evaluate today but no acute surgical plan as of now, leg is warm on exam today       Letty Todd MD  Hospital Medicine Staff  [phone] pager

## 2019-02-06 NOTE — CONSULTS
PHARMACY CONSULT NOTE: WARFARIN  Ren Grove is a 38 y.o. male on warfarin therapy for right leg stenosis. PharmD has been consulted for warfarin dosing.    Current order: not yet ordered  Home dose: never on warfarin before  Coumadin clinic enrollment: Requires enrollment (only a candidate if he has a PCP or cardiologist at [facility])  INR goal: 2-3    Lab Results   Component Value Date    INR 1.0 02/06/2019    INR 0.9 02/05/2019    INR 0.9 02/12/2016     Significant drug interactions: naproxen  Diet: Adult Cardiac    Recommendation(s):    Start warfarin 7.5mg PO daily. Will order add on INR this AM for baseline, and daily thereafter.   APTT supratherapeutic, 92.7 this AM. Heparin gtt stopped and decreased by nurse per protocol. Goal 39-69.  · Will need to figure out if he can attend our coumadin clinic or needs to follow up elsewhere.    Pharmacy will continue to follow and monitor warfarin    Thank you for the consult,  Clara Saunders, PharmD, BCPS  [phone]      **Note: Consults are reviewed Monday-Friday 7:00am-3:30pm. THE ABOVE RECOMMENDATIONS ARE ONLY SUGGESTED.THE RECOMMENDATIONS SHOULD BE CONSIDERED IN CONJUNCTION WITH ALL PATIENT FACTORS. **

## 2019-02-06 NOTE — PLAN OF CARE
Problem: Adult Inpatient Plan of Care  Goal: Plan of Care Review  Outcome: Ongoing (interventions implemented as appropriate)  Patient resting in bed comfortably. IV intact and heparin infusing free of infection and irration, fall precautions maintained no falls noted. Call light in reach bed locked and in lowest position. Non skid socks on while out of bed. Patient instructed to call for assistance. Skin integrity maintained as patient is independent with frequent positioning, C/o pain managed with PRN meds, No other complaints or concerns. Progressing towards goals. Will continue to monitor and follow careplan of care.

## 2019-02-07 LAB
ALBUMIN SERPL BCP-MCNC: 3.9 G/DL
ALP SERPL-CCNC: 85 U/L
ALT SERPL W/O P-5'-P-CCNC: 12 U/L
ANION GAP SERPL CALC-SCNC: 9 MMOL/L
ANISOCYTOSIS BLD QL SMEAR: SLIGHT
APTT BLDCRRT: 41.5 SEC
APTT BLDCRRT: 42.3 SEC
APTT BLDCRRT: 44.9 SEC
AST SERPL-CCNC: 21 U/L
BASOPHILS # BLD AUTO: 0.07 K/UL
BASOPHILS NFR BLD: 1 %
BILIRUB SERPL-MCNC: 0.5 MG/DL
BUN SERPL-MCNC: 13 MG/DL
CALCIUM SERPL-MCNC: 9.5 MG/DL
CHLORIDE SERPL-SCNC: 104 MMOL/L
CO2 SERPL-SCNC: 26 MMOL/L
CREAT SERPL-MCNC: 0.8 MG/DL
DIFFERENTIAL METHOD: NORMAL
EOSINOPHIL # BLD AUTO: 0.3 K/UL
EOSINOPHIL NFR BLD: 3.7 %
ERYTHROCYTE [DISTWIDTH] IN BLOOD BY AUTOMATED COUNT: 12.7 %
EST. GFR  (AFRICAN AMERICAN): >60 ML/MIN/1.73 M^2
EST. GFR  (NON AFRICAN AMERICAN): >60 ML/MIN/1.73 M^2
GLUCOSE SERPL-MCNC: 89 MG/DL
HCT VFR BLD AUTO: 45.7 %
HGB BLD-MCNC: 15.2 G/DL
HYPOCHROMIA BLD QL SMEAR: NORMAL
IMM GRANULOCYTES # BLD AUTO: 0.02 K/UL
IMM GRANULOCYTES NFR BLD AUTO: 0.3 %
INR PPP: 1
LYMPHOCYTES # BLD AUTO: 2.9 K/UL
LYMPHOCYTES NFR BLD: 41.4 %
MCH RBC QN AUTO: 30.5 PG
MCHC RBC AUTO-ENTMCNC: 33.3 G/DL
MCV RBC AUTO: 92 FL
MONOCYTES # BLD AUTO: 0.4 K/UL
MONOCYTES NFR BLD: 6.3 %
NEUTROPHILS # BLD AUTO: 3.3 K/UL
NEUTROPHILS NFR BLD: 47.3 %
NRBC BLD-RTO: 0 /100 WBC
OVALOCYTES BLD QL SMEAR: NORMAL
PLATELET # BLD AUTO: 245 K/UL
PMV BLD AUTO: 10.6 FL
POIKILOCYTOSIS BLD QL SMEAR: SLIGHT
POLYCHROMASIA BLD QL SMEAR: NORMAL
POTASSIUM SERPL-SCNC: 4.1 MMOL/L
PROT SERPL-MCNC: 7.3 G/DL
PROTHROMBIN TIME: 10.1 SEC
RBC # BLD AUTO: 4.99 M/UL
SODIUM SERPL-SCNC: 139 MMOL/L
TARGETS BLD QL SMEAR: NORMAL
WBC # BLD AUTO: 6.94 K/UL

## 2019-02-07 PROCEDURE — 80053 COMPREHEN METABOLIC PANEL: CPT

## 2019-02-07 PROCEDURE — 36415 COLL VENOUS BLD VENIPUNCTURE: CPT

## 2019-02-07 PROCEDURE — 25000003 PHARM REV CODE 250: Performed by: INTERNAL MEDICINE

## 2019-02-07 PROCEDURE — 25000003 PHARM REV CODE 250: Performed by: PHYSICIAN ASSISTANT

## 2019-02-07 PROCEDURE — 85025 COMPLETE CBC W/AUTO DIFF WBC: CPT

## 2019-02-07 PROCEDURE — S4991 NICOTINE PATCH NONLEGEND: HCPCS | Performed by: PHYSICIAN ASSISTANT

## 2019-02-07 PROCEDURE — 11000001 HC ACUTE MED/SURG PRIVATE ROOM

## 2019-02-07 PROCEDURE — 99232 SBSQ HOSP IP/OBS MODERATE 35: CPT | Mod: ,,, | Performed by: INTERNAL MEDICINE

## 2019-02-07 PROCEDURE — 99232 PR SUBSEQUENT HOSPITAL CARE,LEVL II: ICD-10-PCS | Mod: ,,, | Performed by: INTERNAL MEDICINE

## 2019-02-07 PROCEDURE — 85610 PROTHROMBIN TIME: CPT

## 2019-02-07 PROCEDURE — 63600175 PHARM REV CODE 636 W HCPCS: Performed by: PHYSICIAN ASSISTANT

## 2019-02-07 PROCEDURE — 85730 THROMBOPLASTIN TIME PARTIAL: CPT

## 2019-02-07 RX ADMIN — ASPIRIN 81 MG CHEWABLE TABLET 81 MG: 81 TABLET CHEWABLE at 09:02

## 2019-02-07 RX ADMIN — OXYCODONE HYDROCHLORIDE 5 MG: 5 TABLET ORAL at 08:02

## 2019-02-07 RX ADMIN — LISINOPRIL 20 MG: 20 TABLET ORAL at 09:02

## 2019-02-07 RX ADMIN — HYDROCHLOROTHIAZIDE 12.5 MG: 12.5 TABLET ORAL at 09:02

## 2019-02-07 RX ADMIN — ATORVASTATIN CALCIUM 40 MG: 10 TABLET, FILM COATED ORAL at 09:02

## 2019-02-07 RX ADMIN — Medication 2 MG: at 09:02

## 2019-02-07 RX ADMIN — OXYCODONE HYDROCHLORIDE 5 MG: 5 TABLET ORAL at 02:02

## 2019-02-07 RX ADMIN — NICOTINE 1 PATCH: 21 PATCH, EXTENDED RELEASE TRANSDERMAL at 09:02

## 2019-02-07 RX ADMIN — Medication 2 MG: at 04:02

## 2019-02-07 RX ADMIN — GABAPENTIN 600 MG: 100 CAPSULE ORAL at 09:02

## 2019-02-07 RX ADMIN — WARFARIN SODIUM 7.5 MG: 2.5 TABLET ORAL at 04:02

## 2019-02-07 RX ADMIN — GABAPENTIN 600 MG: 100 CAPSULE ORAL at 02:02

## 2019-02-07 RX ADMIN — NAPROXEN 500 MG: 250 TABLET ORAL at 09:02

## 2019-02-07 RX ADMIN — Medication 2 MG: at 10:02

## 2019-02-07 RX ADMIN — Medication 2 MG: at 03:02

## 2019-02-07 RX ADMIN — STANDARDIZED SENNA CONCENTRATE AND DOCUSATE SODIUM 1 TABLET: 8.6; 5 TABLET, FILM COATED ORAL at 09:02

## 2019-02-07 RX ADMIN — AMLODIPINE BESYLATE 10 MG: 10 TABLET ORAL at 09:02

## 2019-02-07 RX ADMIN — HEPARIN SODIUM 14 UNITS/KG/HR: 10000 INJECTION, SOLUTION INTRAVENOUS at 06:02

## 2019-02-07 RX ADMIN — DULOXETINE 60 MG: 60 CAPSULE, DELAYED RELEASE ORAL at 09:02

## 2019-02-07 RX ADMIN — OXYCODONE HYDROCHLORIDE 5 MG: 5 TABLET ORAL at 09:02

## 2019-02-07 NOTE — PROGRESS NOTES
PHARMACY CONSULT NOTE: WARFARIN  Ren Grove is a 38 y.o. male on warfarin therapy for right leg stenosis. PharmD has been consulted for warfarin dosing.    Current order: 7.5mg daily  Home dose: never on warfarin before  Coumadin clinic enrollment: Cannot be enrolled(only a candidate if he has a PCP or cardiologist at [facility])  INR goal: 2-3    Lab Results   Component Value Date    INR 1.0 02/07/2019    INR 1.0 02/06/2019    INR 0.9 02/05/2019     Significant drug interactions: naproxen  Diet: Adult Cardiac    Recommendation(s):    Warfarin 7.5mg PO daily started last night, INR 1 today, continue current dosage.   APTT therapeutic, 42.3 this AM. Goal 39-69.  ·  working on contacting PCP to see if he can check his INR. Cannot attend [facility] b/c he does not have a PCP or cardiologist here.   Pharmacy will continue to follow and monitor warfarin    Thank you for the consult,  Clara Saunders, PharmD, BCPS  [phone]      **Note: Consults are reviewed Monday-Friday 7:00am-3:30pm. THE ABOVE RECOMMENDATIONS ARE ONLY SUGGESTED.THE RECOMMENDATIONS SHOULD BE CONSIDERED IN CONJUNCTION WITH ALL PATIENT FACTORS. **

## 2019-02-08 LAB
ALBUMIN SERPL BCP-MCNC: 3.9 G/DL
ALP SERPL-CCNC: 80 U/L
ALT SERPL W/O P-5'-P-CCNC: 20 U/L
ANION GAP SERPL CALC-SCNC: 9 MMOL/L
APTT BLDCRRT: 44.7 SEC
APTT BLDCRRT: 44.7 SEC
AST SERPL-CCNC: 36 U/L
BASOPHILS # BLD AUTO: 0.05 K/UL
BASOPHILS NFR BLD: 0.7 %
BILIRUB SERPL-MCNC: 0.4 MG/DL
BUN SERPL-MCNC: 15 MG/DL
CALCIUM SERPL-MCNC: 9.4 MG/DL
CHLORIDE SERPL-SCNC: 105 MMOL/L
CO2 SERPL-SCNC: 27 MMOL/L
CREAT SERPL-MCNC: 0.8 MG/DL
DIFFERENTIAL METHOD: ABNORMAL
EOSINOPHIL # BLD AUTO: 0.2 K/UL
EOSINOPHIL NFR BLD: 3.4 %
ERYTHROCYTE [DISTWIDTH] IN BLOOD BY AUTOMATED COUNT: 12.6 %
EST. GFR  (AFRICAN AMERICAN): >60 ML/MIN/1.73 M^2
EST. GFR  (NON AFRICAN AMERICAN): >60 ML/MIN/1.73 M^2
GLUCOSE SERPL-MCNC: 89 MG/DL
HCT VFR BLD AUTO: 48.1 %
HGB BLD-MCNC: 15.7 G/DL
IMM GRANULOCYTES # BLD AUTO: 0.01 K/UL
IMM GRANULOCYTES NFR BLD AUTO: 0.1 %
INR PPP: 1
LYMPHOCYTES # BLD AUTO: 3.6 K/UL
LYMPHOCYTES NFR BLD: 50.8 %
MCH RBC QN AUTO: 30.7 PG
MCHC RBC AUTO-ENTMCNC: 32.6 G/DL
MCV RBC AUTO: 94 FL
MONOCYTES # BLD AUTO: 0.5 K/UL
MONOCYTES NFR BLD: 7 %
NEUTROPHILS # BLD AUTO: 2.7 K/UL
NEUTROPHILS NFR BLD: 38 %
NRBC BLD-RTO: 0 /100 WBC
PLATELET # BLD AUTO: 222 K/UL
PMV BLD AUTO: 11 FL
POTASSIUM SERPL-SCNC: 4.5 MMOL/L
PROT SERPL-MCNC: 7.3 G/DL
PROTHROMBIN TIME: 10.3 SEC
RBC # BLD AUTO: 5.12 M/UL
SODIUM SERPL-SCNC: 141 MMOL/L
WBC # BLD AUTO: 7.05 K/UL

## 2019-02-08 PROCEDURE — 85025 COMPLETE CBC W/AUTO DIFF WBC: CPT

## 2019-02-08 PROCEDURE — 25000003 PHARM REV CODE 250: Performed by: PHYSICIAN ASSISTANT

## 2019-02-08 PROCEDURE — S4991 NICOTINE PATCH NONLEGEND: HCPCS | Performed by: PHYSICIAN ASSISTANT

## 2019-02-08 PROCEDURE — 80053 COMPREHEN METABOLIC PANEL: CPT

## 2019-02-08 PROCEDURE — 25000003 PHARM REV CODE 250: Performed by: INTERNAL MEDICINE

## 2019-02-08 PROCEDURE — 85730 THROMBOPLASTIN TIME PARTIAL: CPT

## 2019-02-08 PROCEDURE — 11000001 HC ACUTE MED/SURG PRIVATE ROOM

## 2019-02-08 PROCEDURE — 85610 PROTHROMBIN TIME: CPT

## 2019-02-08 PROCEDURE — 63600175 PHARM REV CODE 636 W HCPCS: Performed by: PHYSICIAN ASSISTANT

## 2019-02-08 PROCEDURE — 99231 PR SUBSEQUENT HOSPITAL CARE,LEVL I: ICD-10-PCS | Mod: ,,, | Performed by: INTERNAL MEDICINE

## 2019-02-08 PROCEDURE — 36415 COLL VENOUS BLD VENIPUNCTURE: CPT

## 2019-02-08 PROCEDURE — 99231 SBSQ HOSP IP/OBS SF/LOW 25: CPT | Mod: ,,, | Performed by: INTERNAL MEDICINE

## 2019-02-08 RX ORDER — WARFARIN SODIUM 5 MG/1
10 TABLET ORAL DAILY
Status: DISCONTINUED | OUTPATIENT
Start: 2019-02-08 | End: 2019-02-10

## 2019-02-08 RX ORDER — ACETAMINOPHEN 500 MG
1000 TABLET ORAL ONCE
Status: COMPLETED | OUTPATIENT
Start: 2019-02-08 | End: 2019-02-09

## 2019-02-08 RX ADMIN — WARFARIN SODIUM 10 MG: 5 TABLET ORAL at 05:02

## 2019-02-08 RX ADMIN — OXYCODONE HYDROCHLORIDE 5 MG: 5 TABLET ORAL at 04:02

## 2019-02-08 RX ADMIN — DULOXETINE 60 MG: 60 CAPSULE, DELAYED RELEASE ORAL at 09:02

## 2019-02-08 RX ADMIN — Medication 2 MG: at 08:02

## 2019-02-08 RX ADMIN — Medication 2 MG: at 05:02

## 2019-02-08 RX ADMIN — NICOTINE 1 PATCH: 21 PATCH, EXTENDED RELEASE TRANSDERMAL at 09:02

## 2019-02-08 RX ADMIN — STANDARDIZED SENNA CONCENTRATE AND DOCUSATE SODIUM 1 TABLET: 8.6; 5 TABLET, FILM COATED ORAL at 09:02

## 2019-02-08 RX ADMIN — HYDROCHLOROTHIAZIDE 12.5 MG: 12.5 TABLET ORAL at 09:02

## 2019-02-08 RX ADMIN — AMLODIPINE BESYLATE 10 MG: 10 TABLET ORAL at 09:02

## 2019-02-08 RX ADMIN — ATORVASTATIN CALCIUM 40 MG: 10 TABLET, FILM COATED ORAL at 09:02

## 2019-02-08 RX ADMIN — Medication 2 MG: at 12:02

## 2019-02-08 RX ADMIN — GABAPENTIN 600 MG: 100 CAPSULE ORAL at 09:02

## 2019-02-08 RX ADMIN — LISINOPRIL 20 MG: 20 TABLET ORAL at 09:02

## 2019-02-08 RX ADMIN — ASPIRIN 81 MG CHEWABLE TABLET 81 MG: 81 TABLET CHEWABLE at 09:02

## 2019-02-08 RX ADMIN — OXYCODONE HYDROCHLORIDE 5 MG: 5 TABLET ORAL at 05:02

## 2019-02-08 RX ADMIN — OXYCODONE HYDROCHLORIDE 5 MG: 5 TABLET ORAL at 10:02

## 2019-02-08 RX ADMIN — GABAPENTIN 600 MG: 100 CAPSULE ORAL at 02:02

## 2019-02-08 RX ADMIN — Medication 2 MG: at 09:02

## 2019-02-08 NOTE — PLAN OF CARE
Problem: Infection  Goal: Infection Symptom Resolution    Intervention: Prevent or Manage Infection   02/08/19 0224   Prevent or Manage Infection   Infection Management aseptic technique maintained

## 2019-02-08 NOTE — PLAN OF CARE
Problem: Adult Inpatient Plan of Care  Goal: Plan of Care Review  Outcome: Ongoing (interventions implemented as appropriate)  Pt verbalizes understanding of on going plan of care

## 2019-02-08 NOTE — PROGRESS NOTES
Hospital Medicine   Progress note   Team: [facility] [place] [facility] MED O Letty Todd MD   Admit Date: 2/4/2019   COLEEN 2/9/2019   Code status: Full Code   Principal Problem: Vascular complication     Interval hx: No events overnight.  Leg still warm this am.  Bridging coumadin, INR at 1 today, will increase coumadin dose to 10mg tonight.  No CP or SOB.      ROS   Constitutional: Negative for chills, fatigue, fever.   HENT: Negative for sore throat, trouble swallowing.    Eyes: Negative for photophobia, visual disturbance.   Respiratory: Negative for cough, shortness of breath.    Cardiovascular: Negative for chest pain, palpitations, leg swelling.   Gastrointestinal: Negative for abdominal pain, constipation, diarrhea, nausea, vomiting.   Endocrine: Negative for cold intolerance, heat intolerance.   Genitourinary: Negative for dysuria, frequency.   Musculoskeletal: + arthralgias, myalgias (RLE)  Skin: Negative for rash, erythema , +skin irritation in RLE  Neurological: Negative for dizziness, syncope, weakness, light-headedness.   Psychiatric/Behavioral: Negative for confusion, hallucinations, anxiety  All other systems reviewed and are negative.    PEx   Temp:  [96.1 °F (35.6 °C)-98.7 °F (37.1 °C)]   Pulse:  [77-97]   Resp:  [15-20]   BP: (123-147)/(79-90)   SpO2:  [95 %-98 %]      I & O (Last 24H):     Intake/Output Summary (Last 24 hours) at 2/8/2019 0741  Last data filed at 2/8/2019 0500  Gross per 24 hour   Intake 1440 ml   Output --   Net 1440 ml       General appearance: no distress, alert and oriented x 3  HEENT:  conjunctivae/corneas clear, PERRL, mucous membranes moist   Neck: supple, thyroid not enlarged  Pulm:   normal respiratory effort, CTAB, no wheezes, rales or rhonchi  Card: RRR, S1, S2 normal, no murmur, click, rub or gallop  Abd: soft, NT, ND, BS present; no masses, no organomegaly  Ext: R BKA with warmth noted and mild erythema, still less warm than LLE but improving, mild skin irritation at the base around the  site where prosthesis attaches  Pulses: 2+, symmetric  Skin: color, texture, turgor normal. No rashes or lesions  Neuro: CN II-XII grossly intact, no focal numbness or weakness, normal strength and tone   Psych: normal mood and affect      Recent Results (from the past 24 hour(s))   APTT    Collection Time: 02/07/19 12:22 PM   Result Value Ref Range    aPTT 44.9 (H) 21.0 - 32.0 sec   Comprehensive Metabolic Panel (CMP)    Collection Time: 02/08/19  4:22 AM   Result Value Ref Range    Sodium 141 136 - 145 mmol/L    Potassium 4.5 3.5 - 5.1 mmol/L    Chloride 105 95 - 110 mmol/L    CO2 27 23 - 29 mmol/L    Glucose 89 70 - 110 mg/dL    BUN, Bld 15 6 - 20 mg/dL    Creatinine 0.8 0.5 - 1.4 mg/dL    Calcium 9.4 8.7 - 10.5 mg/dL    Total Protein 7.3 6.0 - 8.4 g/dL    Albumin 3.9 3.5 - 5.2 g/dL    Total Bilirubin 0.4 0.1 - 1.0 mg/dL    Alkaline Phosphatase 80 55 - 135 U/L    AST 36 10 - 40 U/L    ALT 20 10 - 44 U/L    Anion Gap 9 8 - 16 mmol/L    eGFR if African American >60.0 >60 mL/min/1.73 m^2    eGFR if non African American >60.0 >60 mL/min/1.73 m^2   CBC with Automated Differential    Collection Time: 02/08/19  4:22 AM   Result Value Ref Range    WBC 7.05 3.90 - 12.70 K/uL    RBC 5.12 4.60 - 6.20 M/uL    Hemoglobin 15.7 14.0 - 18.0 g/dL    Hematocrit 48.1 40.0 - 54.0 %    MCV 94 82 - 98 fL    MCH 30.7 27.0 - 31.0 pg    MCHC 32.6 32.0 - 36.0 g/dL    RDW 12.6 11.5 - 14.5 %    Platelets 222 150 - 350 K/uL    MPV 11.0 9.2 - 12.9 fL    Immature Granulocytes 0.1 0.0 - 0.5 %    Gran # (ANC) 2.7 1.8 - 7.7 K/uL    Immature Grans (Abs) 0.01 0.00 - 0.04 K/uL    Lymph # 3.6 1.0 - 4.8 K/uL    Mono # 0.5 0.3 - 1.0 K/uL    Eos # 0.2 0.0 - 0.5 K/uL    Baso # 0.05 0.00 - 0.20 K/uL    nRBC 0 0 /100 WBC    Gran% 38.0 38.0 - 73.0 %    Lymph% 50.8 (H) 18.0 - 48.0 %    Mono% 7.0 4.0 - 15.0 %    Eosinophil% 3.4 0.0 - 8.0 %    Basophil% 0.7 0.0 - 1.9 %    Differential Method Automated    APTT    Collection Time: 02/08/19  4:22 AM   Result  Value Ref Range    aPTT 44.7 (H) 21.0 - 32.0 sec   Protime-INR    Collection Time: 02/08/19  4:22 AM   Result Value Ref Range    Prothrombin Time 10.3 9.0 - 12.5 sec    INR 1.0 0.8 - 1.2   APTT    Collection Time: 02/08/19  4:22 AM   Result Value Ref Range    aPTT 44.7 (H) 21.0 - 32.0 sec       No results for input(s): POCTGLUCOSE in the last 168 hours.    Hemoglobin A1C   Date Value Ref Range Status   02/05/2019 5.1 4.0 - 5.6 % Final     Comment:     ADA Screening Guidelines:  5.7-6.4%  Consistent with prediabetes  >or=6.5%  Consistent with diabetes  High levels of fetal hemoglobin interfere with the HbA1C  assay. Heterozygous hemoglobin variants (HbS, HgC, etc)do  not significantly interfere with this assay.   However, presence of multiple variants may affect accuracy.     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         amLODIPine  10 mg Oral Daily    aspirin  81 mg Oral Daily    atorvastatin  40 mg Oral Daily    DULoxetine  60 mg Oral Daily    gabapentin  600 mg Oral TID    hydroCHLOROthiazide  12.5 mg Oral Daily    lisinopril  20 mg Oral Daily    naproxen  500 mg Oral BID    nicotine  1 patch Transdermal Daily    senna-docusate 8.6-50 mg  1 tablet Oral BID    warfarin  10 mg Oral Daily     acetaminophen, albuterol-ipratropium, ALPRAZolam, dextrose 50%, dextrose 50%, glucagon (human recombinant), glucose, glucose, heparin (PORCINE), heparin (PORCINE), morphine, ondansetron, oxyCODONE, promethazine, ramelteon, sodium chloride 0.9%, sodium chloride  0.9%      Assessment and Plan for problems addressed today:   1. Vascular complication with history of right below knee amputation  - ESR 43, CRP 8.7, .  - UA RLE arteries and veins unremarkable.  - CTA RLE showed extensive atherosclerosis resulting in focal narrowing at the proximal aspect of the right popliteal artery likely representing 50% stenosis.  No vascular flow visualized beyond the proximal anterior tibial artery; uncertain if this represents normal postoperative vascular morphology given lack of prior imaging, good flow per vascular sx review  - Appreciate vascular surgery's assistance. No acute surgical intervention planned  - continue heparin drip until INR therapeutic, started coumadin 7.5mg, increase to 10mg tonight  - PCP will manage coumadin per patient   - consult pharm to assist with coumadin dosing       2. Tobacco abuse  - Nicoderm patch  - Counseled on cessation      3. Anxiety and depression  - Continue Cymbalta 60mg daily and PRN Xanax 0.5mg TID PRN.     4. HLD (hyperlipidemia)  - Continue Lipitor 40mg daily.     5. Essential hypertension  - Continue amlodipine 10mg daily, HCTZ 12.5mg daily, lisinopril 20mg daily.           Diet: cardiac  DVT PPx: heparin   Code status: FULL     Discharge plan and follow up:  Continue heparin drip and coumadin, awaiting until therpeutic, vascular has no acute surgical plan as of now, leg is warm on exam today, PCP will manage Coumadin as an outpatient, patient instructed to call to get an appointment next week      Letty Todd MD  [facility] Medicine Staff  [phone] pager

## 2019-02-08 NOTE — PROGRESS NOTES
PHARMACY CONSULT NOTE: WARFARIN  Ren Grove is a 38 y.o. male on warfarin therapy for right leg stenosis. PharmD has been consulted for warfarin dosing.    Current order: 7.5mg daily  Home dose: never on warfarin before  Coumadin clinic enrollment: Case mgmt set up pt with [facility] PCP so now patient has been referred to our CC  INR goal: 2-3    Lab Results   Component Value Date    INR 1.0 02/08/2019    INR 1.0 02/07/2019    INR 1.0 02/06/2019     Significant drug interactions: naproxen  Diet: Adult Cardiac    Recommendation(s):    INR 1 after two doses of warfarin 7.5mg, will increase to 10mg tonight.    APTT therapeutic, 44.7 this AM. Goal 39-69.  · Case mgmt set up pt with [facility] PCP so now patient has been referred to our CC.   Pharmacy will continue to follow and monitor warfarin    Thank you for the consult,  Clara Saunders, PharmD, BCPS  [phone]    **Note: Consults are reviewed Monday-Friday 7:00am-3:30pm. THE ABOVE RECOMMENDATIONS ARE ONLY SUGGESTED.THE RECOMMENDATIONS SHOULD BE CONSIDERED IN CONJUNCTION WITH ALL PATIENT FACTORS. **

## 2019-02-08 NOTE — NURSING
Patient rounds made more than every hour.  Vital signs stable.  Afebrile.  Pupils equal and reactive.  No complaints of chest pain or shortness of breath.  This shift he only complained of pain to stump to right BKA.  Ambulates to the bathroom without assistance using his right leg prothesis.  Voided x2 this shift.   Remained free from falls or incodents this shift.

## 2019-02-08 NOTE — PLAN OF CARE
02/08/19 1104   Discharge Reassessment   Assessment Type Discharge Planning Reassessment   Do you have any problems affording any of your prescribed medications? Yes   Discharge Plan A Home;Home with family     Continue heparin drip and coumadin, awaiting until therapeutic.     1257  Received call from patient's PCP Dr. Bermeo staff and Dr. Bermeo will not be able to follow patient's INR level.  Patient established with [facility] PCP and will be enrolled in [facility] per staff pharmacist.

## 2019-02-09 LAB
ALBUMIN SERPL BCP-MCNC: 3.7 G/DL
ALP SERPL-CCNC: 81 U/L
ALT SERPL W/O P-5'-P-CCNC: 51 U/L
ANION GAP SERPL CALC-SCNC: 6 MMOL/L
ANISOCYTOSIS BLD QL SMEAR: SLIGHT
APTT BLDCRRT: 48.7 SEC
AST SERPL-CCNC: 75 U/L
BASOPHILS # BLD AUTO: 0.08 K/UL
BASOPHILS NFR BLD: 1.1 %
BILIRUB SERPL-MCNC: 0.3 MG/DL
BUN SERPL-MCNC: 15 MG/DL
CALCIUM SERPL-MCNC: 9.9 MG/DL
CHLORIDE SERPL-SCNC: 103 MMOL/L
CO2 SERPL-SCNC: 27 MMOL/L
CREAT SERPL-MCNC: 0.8 MG/DL
DIFFERENTIAL METHOD: ABNORMAL
EOSINOPHIL # BLD AUTO: 0.2 K/UL
EOSINOPHIL NFR BLD: 3.3 %
ERYTHROCYTE [DISTWIDTH] IN BLOOD BY AUTOMATED COUNT: 12.6 %
EST. GFR  (AFRICAN AMERICAN): >60 ML/MIN/1.73 M^2
EST. GFR  (NON AFRICAN AMERICAN): >60 ML/MIN/1.73 M^2
GLUCOSE SERPL-MCNC: 96 MG/DL
HCT VFR BLD AUTO: 47.1 %
HGB BLD-MCNC: 15.7 G/DL
IMM GRANULOCYTES # BLD AUTO: 0.03 K/UL
IMM GRANULOCYTES NFR BLD AUTO: 0.4 %
INR PPP: 1.1
LYMPHOCYTES # BLD AUTO: 3.6 K/UL
LYMPHOCYTES NFR BLD: 49.4 %
MCH RBC QN AUTO: 30.1 PG
MCHC RBC AUTO-ENTMCNC: 33.3 G/DL
MCV RBC AUTO: 90 FL
MONOCYTES # BLD AUTO: 0.5 K/UL
MONOCYTES NFR BLD: 7.4 %
NEUTROPHILS # BLD AUTO: 2.8 K/UL
NEUTROPHILS NFR BLD: 38.4 %
NRBC BLD-RTO: 0 /100 WBC
PLATELET # BLD AUTO: 243 K/UL
PLATELET BLD QL SMEAR: ABNORMAL
PMV BLD AUTO: 10.7 FL
POTASSIUM SERPL-SCNC: 4.3 MMOL/L
PROT SERPL-MCNC: 7.4 G/DL
PROTHROMBIN TIME: 10.9 SEC
RBC # BLD AUTO: 5.22 M/UL
SODIUM SERPL-SCNC: 136 MMOL/L
WBC # BLD AUTO: 7.2 K/UL

## 2019-02-09 PROCEDURE — 11000001 HC ACUTE MED/SURG PRIVATE ROOM

## 2019-02-09 PROCEDURE — 63600175 PHARM REV CODE 636 W HCPCS: Performed by: PHYSICIAN ASSISTANT

## 2019-02-09 PROCEDURE — 25000003 PHARM REV CODE 250: Performed by: INTERNAL MEDICINE

## 2019-02-09 PROCEDURE — 85610 PROTHROMBIN TIME: CPT

## 2019-02-09 PROCEDURE — 85025 COMPLETE CBC W/AUTO DIFF WBC: CPT

## 2019-02-09 PROCEDURE — 25000003 PHARM REV CODE 250: Performed by: PHYSICIAN ASSISTANT

## 2019-02-09 PROCEDURE — S4991 NICOTINE PATCH NONLEGEND: HCPCS | Performed by: PHYSICIAN ASSISTANT

## 2019-02-09 PROCEDURE — 99231 PR SUBSEQUENT HOSPITAL CARE,LEVL I: ICD-10-PCS | Mod: ,,, | Performed by: INTERNAL MEDICINE

## 2019-02-09 PROCEDURE — 80053 COMPREHEN METABOLIC PANEL: CPT

## 2019-02-09 PROCEDURE — 85730 THROMBOPLASTIN TIME PARTIAL: CPT

## 2019-02-09 PROCEDURE — 99231 SBSQ HOSP IP/OBS SF/LOW 25: CPT | Mod: ,,, | Performed by: INTERNAL MEDICINE

## 2019-02-09 RX ADMIN — GABAPENTIN 600 MG: 100 CAPSULE ORAL at 08:02

## 2019-02-09 RX ADMIN — ATORVASTATIN CALCIUM 40 MG: 10 TABLET, FILM COATED ORAL at 08:02

## 2019-02-09 RX ADMIN — ACETAMINOPHEN 1000 MG: 500 TABLET ORAL at 01:02

## 2019-02-09 RX ADMIN — Medication 2 MG: at 01:02

## 2019-02-09 RX ADMIN — Medication 2 MG: at 07:02

## 2019-02-09 RX ADMIN — ASPIRIN 81 MG CHEWABLE TABLET 81 MG: 81 TABLET CHEWABLE at 08:02

## 2019-02-09 RX ADMIN — LISINOPRIL 20 MG: 20 TABLET ORAL at 08:02

## 2019-02-09 RX ADMIN — GABAPENTIN 600 MG: 100 CAPSULE ORAL at 02:02

## 2019-02-09 RX ADMIN — DULOXETINE 60 MG: 60 CAPSULE, DELAYED RELEASE ORAL at 08:02

## 2019-02-09 RX ADMIN — NICOTINE 1 PATCH: 21 PATCH, EXTENDED RELEASE TRANSDERMAL at 08:02

## 2019-02-09 RX ADMIN — Medication 2 MG: at 10:02

## 2019-02-09 RX ADMIN — STANDARDIZED SENNA CONCENTRATE AND DOCUSATE SODIUM 1 TABLET: 8.6; 5 TABLET, FILM COATED ORAL at 08:02

## 2019-02-09 RX ADMIN — NAPROXEN 500 MG: 250 TABLET ORAL at 08:02

## 2019-02-09 RX ADMIN — Medication 2 MG: at 02:02

## 2019-02-09 RX ADMIN — OXYCODONE HYDROCHLORIDE 5 MG: 5 TABLET ORAL at 08:02

## 2019-02-09 RX ADMIN — Medication 2 MG: at 06:02

## 2019-02-09 RX ADMIN — AMLODIPINE BESYLATE 10 MG: 10 TABLET ORAL at 08:02

## 2019-02-09 RX ADMIN — HYDROCHLOROTHIAZIDE 12.5 MG: 12.5 TABLET ORAL at 08:02

## 2019-02-09 RX ADMIN — WARFARIN SODIUM 10 MG: 5 TABLET ORAL at 05:02

## 2019-02-09 RX ADMIN — HEPARIN SODIUM 18 UNITS/KG/HR: 10000 INJECTION, SOLUTION INTRAVENOUS at 05:02

## 2019-02-09 RX ADMIN — OXYCODONE HYDROCHLORIDE 5 MG: 5 TABLET ORAL at 06:02

## 2019-02-09 NOTE — PROGRESS NOTES
Hospital Medicine   Progress note   Team: [facility] [place] HOSP MED O Letty Todd MD   Admit Date: 2/4/2019   COLEEN 2/9/2019   Code status: Full Code   Principal Problem: Vascular complication     Interval hx: No events overnight.  Leg still warm this am.  Bridging coumadin, INR at 1.1 today, will give another coumadin dose of 10mg tonight.  No CP or SOB.      ROS   Constitutional: Negative for chills, fatigue, fever.   HENT: Negative for sore throat, trouble swallowing.    Eyes: Negative for photophobia, visual disturbance.   Respiratory: Negative for cough, shortness of breath.    Cardiovascular: Negative for chest pain, palpitations, leg swelling.   Gastrointestinal: Negative for abdominal pain, constipation, diarrhea, nausea, vomiting.   Endocrine: Negative for cold intolerance, heat intolerance.   Genitourinary: Negative for dysuria, frequency.   Musculoskeletal: + arthralgias, myalgias (RLE)  Skin: Negative for rash, erythema , +skin irritation in RLE  Neurological: Negative for dizziness, syncope, weakness, light-headedness.   Psychiatric/Behavioral: Negative for confusion, hallucinations, anxiety  All other systems reviewed and are negative.    PEx   Temp:  [95.9 °F (35.5 °C)-97.4 °F (36.3 °C)]   Pulse:  [73-89]   Resp:  [17-20]   BP: (110-155)/(75-82)   SpO2:  [94 %-97 %]      I & O (Last 24H):   No intake or output data in the 24 hours ending 02/09/19 0810    General appearance: no distress, alert and oriented x 3  HEENT:  conjunctivae/corneas clear, PERRL, mucous membranes moist   Neck: supple, thyroid not enlarged  Pulm:   normal respiratory effort, CTAB, no wheezes, rales or rhonchi  Card: RRR, S1, S2 normal, no murmur, click, rub or gallop  Abd: soft, NT, ND, BS present; no masses, no organomegaly  Ext: R BKA with warmth noted and mild erythema, still less warm than LLE but improving, mild skin irritation at the base around the site where prosthesis attaches  Pulses: 2+, symmetric  Skin: color, texture, turgor  normal. No rashes or lesions  Neuro: CN II-XII grossly intact, no focal numbness or weakness, normal strength and tone   Psych: normal mood and affect      Recent Results (from the past 24 hour(s))   Comprehensive Metabolic Panel (CMP)    Collection Time: 02/09/19  5:28 AM   Result Value Ref Range    Sodium 136 136 - 145 mmol/L    Potassium 4.3 3.5 - 5.1 mmol/L    Chloride 103 95 - 110 mmol/L    CO2 27 23 - 29 mmol/L    Glucose 96 70 - 110 mg/dL    BUN, Bld 15 6 - 20 mg/dL    Creatinine 0.8 0.5 - 1.4 mg/dL    Calcium 9.9 8.7 - 10.5 mg/dL    Total Protein 7.4 6.0 - 8.4 g/dL    Albumin 3.7 3.5 - 5.2 g/dL    Total Bilirubin 0.3 0.1 - 1.0 mg/dL    Alkaline Phosphatase 81 55 - 135 U/L    AST 75 (H) 10 - 40 U/L    ALT 51 (H) 10 - 44 U/L    Anion Gap 6 (L) 8 - 16 mmol/L    eGFR if African American >60.0 >60 mL/min/1.73 m^2    eGFR if non African American >60.0 >60 mL/min/1.73 m^2   CBC with Automated Differential    Collection Time: 02/09/19  5:28 AM   Result Value Ref Range    WBC 7.20 3.90 - 12.70 K/uL    RBC 5.22 4.60 - 6.20 M/uL    Hemoglobin 15.7 14.0 - 18.0 g/dL    Hematocrit 47.1 40.0 - 54.0 %    MCV 90 82 - 98 fL    MCH 30.1 27.0 - 31.0 pg    MCHC 33.3 32.0 - 36.0 g/dL    RDW 12.6 11.5 - 14.5 %    Platelets 243 150 - 350 K/uL    MPV 10.7 9.2 - 12.9 fL    Immature Granulocytes 0.4 0.0 - 0.5 %    Gran # (ANC) 2.8 1.8 - 7.7 K/uL    Immature Grans (Abs) 0.03 0.00 - 0.04 K/uL    Lymph # 3.6 1.0 - 4.8 K/uL    Mono # 0.5 0.3 - 1.0 K/uL    Eos # 0.2 0.0 - 0.5 K/uL    Baso # 0.08 0.00 - 0.20 K/uL    nRBC 0 0 /100 WBC    Gran% 38.4 38.0 - 73.0 %    Lymph% 49.4 (H) 18.0 - 48.0 %    Mono% 7.4 4.0 - 15.0 %    Eosinophil% 3.3 0.0 - 8.0 %    Basophil% 1.1 0.0 - 1.9 %    Platelet Estimate Appears normal     Aniso Slight     Differential Method Automated    APTT    Collection Time: 02/09/19  5:28 AM   Result Value Ref Range    aPTT 48.7 (H) 21.0 - 32.0 sec   Protime-INR    Collection Time: 02/09/19  5:28 AM   Result Value Ref Range     Prothrombin Time 10.9 9.0 - 12.5 sec    INR 1.1 0.8 - 1.2       No results for input(s): POCTGLUCOSE in the last 168 hours.    Hemoglobin A1C   Date Value Ref Range Status   02/05/2019 5.1 4.0 - 5.6 % Final     Comment:     ADA Screening Guidelines:  5.7-6.4%  Consistent with prediabetes  >or=6.5%  Consistent with diabetes  High levels of fetal hemoglobin interfere with the HbA1C  assay. Heterozygous hemoglobin variants (HbS, HgC, etc)do  not significantly interfere with this assay.   However, presence of multiple variants may affect accuracy.     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         amLODIPine  10 mg Oral Daily    aspirin  81 mg Oral Daily    atorvastatin  40 mg Oral Daily    DULoxetine  60 mg Oral Daily    gabapentin  600 mg Oral TID    hydroCHLOROthiazide  12.5 mg Oral Daily    lisinopril  20 mg Oral Daily    naproxen  500 mg Oral BID    nicotine  1 patch Transdermal Daily    senna-docusate 8.6-50 mg  1 tablet Oral BID    warfarin  10 mg Oral Daily     acetaminophen, albuterol-ipratropium, ALPRAZolam, dextrose 50%, dextrose 50%, glucagon (human recombinant), glucose, glucose, heparin (PORCINE), heparin (PORCINE), morphine, ondansetron, oxyCODONE, promethazine, ramelteon, sodium chloride 0.9%, sodium chloride 0.9%      Assessment and Plan for problems addressed today:   1. Vascular complication with history of right below knee amputation  - ESR 43, CRP 8.7, .  - UA RLE arteries and veins unremarkable.  - CTA RLE showed extensive  atherosclerosis resulting in focal narrowing at the proximal aspect of the right popliteal artery likely representing 50% stenosis.  No vascular flow visualized beyond the proximal anterior tibial artery; uncertain if this represents normal postoperative vascular morphology given lack of prior imaging, good flow per vascular sx review  - Appreciate vascular surgery's assistance. No acute surgical intervention planned  - continue heparin drip until INR therapeutic, started coumadin 7.5mg, give another 10mg tonight  - PCP unable to manage coumadin so will need to establish here with new PCP   - consult pharm to assist with coumadin dosing       2. Tobacco abuse  - Nicoderm patch  - Counseled on cessation      3. Anxiety and depression  - Continue Cymbalta 60mg daily and PRN Xanax 0.5mg TID PRN.     4. HLD (hyperlipidemia)  - Continue Lipitor 40mg daily.     5. Essential hypertension  - Continue amlodipine 10mg daily, HCTZ 12.5mg daily, lisinopril 20mg daily.           Diet: cardiac  DVT PPx: heparin   Code status: FULL     Discharge plan and follow up:  Continue heparin drip and coumadin, awaiting until therpeutic, vascular has no acute surgical plan as of now, leg is warm on exam today, will establish with [facility] PCP to get into coumadin clinic to manage coumadin       Letty Todd MD  Hospital Medicine Staff  [phone] pager

## 2019-02-10 LAB
ALBUMIN SERPL BCP-MCNC: 3.9 G/DL
ALP SERPL-CCNC: 81 U/L
ALT SERPL W/O P-5'-P-CCNC: 70 U/L
ANION GAP SERPL CALC-SCNC: 9 MMOL/L
APTT BLDCRRT: 52.3 SEC
AST SERPL-CCNC: 85 U/L
BASOPHILS # BLD AUTO: 0.08 K/UL
BASOPHILS NFR BLD: 0.9 %
BILIRUB SERPL-MCNC: 0.4 MG/DL
BUN SERPL-MCNC: 15 MG/DL
CALCIUM SERPL-MCNC: 10 MG/DL
CHLORIDE SERPL-SCNC: 104 MMOL/L
CO2 SERPL-SCNC: 25 MMOL/L
CREAT SERPL-MCNC: 0.8 MG/DL
DIFFERENTIAL METHOD: NORMAL
EOSINOPHIL # BLD AUTO: 0.2 K/UL
EOSINOPHIL NFR BLD: 2.8 %
ERYTHROCYTE [DISTWIDTH] IN BLOOD BY AUTOMATED COUNT: 12.6 %
EST. GFR  (AFRICAN AMERICAN): >60 ML/MIN/1.73 M^2
EST. GFR  (NON AFRICAN AMERICAN): >60 ML/MIN/1.73 M^2
GLUCOSE SERPL-MCNC: 93 MG/DL
HCT VFR BLD AUTO: 48.4 %
HGB BLD-MCNC: 15.7 G/DL
IMM GRANULOCYTES # BLD AUTO: 0.03 K/UL
IMM GRANULOCYTES NFR BLD AUTO: 0.4 %
INR PPP: 1.1
LYMPHOCYTES # BLD AUTO: 3.9 K/UL
LYMPHOCYTES NFR BLD: 46.2 %
MCH RBC QN AUTO: 29.6 PG
MCHC RBC AUTO-ENTMCNC: 32.4 G/DL
MCV RBC AUTO: 91 FL
MONOCYTES # BLD AUTO: 0.7 K/UL
MONOCYTES NFR BLD: 8.1 %
NEUTROPHILS # BLD AUTO: 3.5 K/UL
NEUTROPHILS NFR BLD: 41.6 %
NRBC BLD-RTO: 0 /100 WBC
PLATELET # BLD AUTO: 243 K/UL
PMV BLD AUTO: 10.9 FL
POTASSIUM SERPL-SCNC: 4 MMOL/L
PROT SERPL-MCNC: 7.5 G/DL
PROTHROMBIN TIME: 11.2 SEC
RBC # BLD AUTO: 5.31 M/UL
SODIUM SERPL-SCNC: 138 MMOL/L
WBC # BLD AUTO: 8.48 K/UL

## 2019-02-10 PROCEDURE — 25000003 PHARM REV CODE 250: Performed by: PHYSICIAN ASSISTANT

## 2019-02-10 PROCEDURE — 85730 THROMBOPLASTIN TIME PARTIAL: CPT

## 2019-02-10 PROCEDURE — 11000001 HC ACUTE MED/SURG PRIVATE ROOM

## 2019-02-10 PROCEDURE — 25000003 PHARM REV CODE 250: Performed by: HOSPITALIST

## 2019-02-10 PROCEDURE — 80053 COMPREHEN METABOLIC PANEL: CPT

## 2019-02-10 PROCEDURE — 25000003 PHARM REV CODE 250: Performed by: INTERNAL MEDICINE

## 2019-02-10 PROCEDURE — 85025 COMPLETE CBC W/AUTO DIFF WBC: CPT

## 2019-02-10 PROCEDURE — 99231 PR SUBSEQUENT HOSPITAL CARE,LEVL I: ICD-10-PCS | Mod: ,,, | Performed by: INTERNAL MEDICINE

## 2019-02-10 PROCEDURE — 63600175 PHARM REV CODE 636 W HCPCS: Performed by: PHYSICIAN ASSISTANT

## 2019-02-10 PROCEDURE — 99231 SBSQ HOSP IP/OBS SF/LOW 25: CPT | Mod: ,,, | Performed by: INTERNAL MEDICINE

## 2019-02-10 PROCEDURE — 85610 PROTHROMBIN TIME: CPT

## 2019-02-10 PROCEDURE — S4991 NICOTINE PATCH NONLEGEND: HCPCS | Performed by: PHYSICIAN ASSISTANT

## 2019-02-10 RX ORDER — OXYCODONE HYDROCHLORIDE 10 MG/1
10 TABLET ORAL EVERY 4 HOURS PRN
Status: DISCONTINUED | OUTPATIENT
Start: 2019-02-10 | End: 2019-02-15 | Stop reason: HOSPADM

## 2019-02-10 RX ORDER — WARFARIN SODIUM 5 MG/1
15 TABLET ORAL DAILY
Status: DISCONTINUED | OUTPATIENT
Start: 2019-02-10 | End: 2019-02-10

## 2019-02-10 RX ADMIN — HYDROCHLOROTHIAZIDE 12.5 MG: 12.5 TABLET ORAL at 08:02

## 2019-02-10 RX ADMIN — Medication 2 MG: at 01:02

## 2019-02-10 RX ADMIN — GABAPENTIN 600 MG: 100 CAPSULE ORAL at 08:02

## 2019-02-10 RX ADMIN — Medication 2 MG: at 09:02

## 2019-02-10 RX ADMIN — ATORVASTATIN CALCIUM 40 MG: 10 TABLET, FILM COATED ORAL at 08:02

## 2019-02-10 RX ADMIN — OXYCODONE HYDROCHLORIDE 5 MG: 5 TABLET ORAL at 01:02

## 2019-02-10 RX ADMIN — Medication 2 MG: at 12:02

## 2019-02-10 RX ADMIN — LISINOPRIL 20 MG: 20 TABLET ORAL at 08:02

## 2019-02-10 RX ADMIN — OXYCODONE HYDROCHLORIDE 5 MG: 5 TABLET ORAL at 08:02

## 2019-02-10 RX ADMIN — Medication 2 MG: at 05:02

## 2019-02-10 RX ADMIN — OXYCODONE HYDROCHLORIDE 10 MG: 10 TABLET ORAL at 08:02

## 2019-02-10 RX ADMIN — STANDARDIZED SENNA CONCENTRATE AND DOCUSATE SODIUM 1 TABLET: 8.6; 5 TABLET, FILM COATED ORAL at 08:02

## 2019-02-10 RX ADMIN — NICOTINE 1 PATCH: 21 PATCH, EXTENDED RELEASE TRANSDERMAL at 08:02

## 2019-02-10 RX ADMIN — HEPARIN SODIUM 18 UNITS/KG/HR: 10000 INJECTION, SOLUTION INTRAVENOUS at 05:02

## 2019-02-10 RX ADMIN — ASPIRIN 81 MG CHEWABLE TABLET 81 MG: 81 TABLET CHEWABLE at 08:02

## 2019-02-10 RX ADMIN — AMLODIPINE BESYLATE 10 MG: 10 TABLET ORAL at 08:02

## 2019-02-10 RX ADMIN — GABAPENTIN 600 MG: 100 CAPSULE ORAL at 04:02

## 2019-02-10 RX ADMIN — DULOXETINE 60 MG: 60 CAPSULE, DELAYED RELEASE ORAL at 08:02

## 2019-02-10 RX ADMIN — WARFARIN SODIUM 12.5 MG: 2.5 TABLET ORAL at 04:02

## 2019-02-10 NOTE — PROGRESS NOTES
Hospital Medicine   Progress note   Team: [facility] [place] [facility] O Letty Todd MD   Admit Date: 2/4/2019   COLEEN 2/11/2019   Code status: Full Code   Principal Problem: Vascular complication     Interval hx: No events overnight.  Leg still warm this am.  Bridging coumadin, INR still at 1.1 today, will increase coumadin dose to 12.5mg tonight.  No CP or SOB.      ROS   Constitutional: Negative for chills, fatigue, fever.   HENT: Negative for sore throat, trouble swallowing.    Eyes: Negative for photophobia, visual disturbance.   Respiratory: Negative for cough, shortness of breath.    Cardiovascular: Negative for chest pain, palpitations, leg swelling.   Gastrointestinal: Negative for abdominal pain, constipation, diarrhea, nausea, vomiting.   Endocrine: Negative for cold intolerance, heat intolerance.   Genitourinary: Negative for dysuria, frequency.   Musculoskeletal: + arthralgias, myalgias (RLE)  Skin: Negative for rash, erythema , +skin irritation in RLE  Neurological: Negative for dizziness, syncope, weakness, light-headedness.   Psychiatric/Behavioral: Negative for confusion, hallucinations, anxiety  All other systems reviewed and are negative.    PEx   Temp:  [96.4 °F (35.8 °C)-97.1 °F (36.2 °C)]   Pulse:  [80-94]   Resp:  [18]   BP: (109-141)/(66-90)   SpO2:  [93 %-97 %]      I & O (Last 24H):   No intake or output data in the 24 hours ending 02/10/19 0815    General appearance: no distress, alert and oriented x 3  HEENT:  conjunctivae/corneas clear, PERRL, mucous membranes moist   Neck: supple, thyroid not enlarged  Pulm:   normal respiratory effort, CTAB, no wheezes, rales or rhonchi  Card: RRR, S1, S2 normal, no murmur, click, rub or gallop  Abd: soft, NT, ND, BS present; no masses, no organomegaly  Ext: R BKA with warmth noted and mild erythema, still less warm than LLE but improving, mild skin irritation at the base around the site where prosthesis attaches  Pulses: 2+, symmetric  Skin: color, texture, turgor  normal. No rashes or lesions  Neuro: CN II-XII grossly intact, no focal numbness or weakness, normal strength and tone   Psych: normal mood and affect      Recent Results (from the past 24 hour(s))   Comprehensive Metabolic Panel (CMP)    Collection Time: 02/10/19  5:11 AM   Result Value Ref Range    Sodium 138 136 - 145 mmol/L    Potassium 4.0 3.5 - 5.1 mmol/L    Chloride 104 95 - 110 mmol/L    CO2 25 23 - 29 mmol/L    Glucose 93 70 - 110 mg/dL    BUN, Bld 15 6 - 20 mg/dL    Creatinine 0.8 0.5 - 1.4 mg/dL    Calcium 10.0 8.7 - 10.5 mg/dL    Total Protein 7.5 6.0 - 8.4 g/dL    Albumin 3.9 3.5 - 5.2 g/dL    Total Bilirubin 0.4 0.1 - 1.0 mg/dL    Alkaline Phosphatase 81 55 - 135 U/L    AST 85 (H) 10 - 40 U/L    ALT 70 (H) 10 - 44 U/L    Anion Gap 9 8 - 16 mmol/L    eGFR if African American >60.0 >60 mL/min/1.73 m^2    eGFR if non African American >60.0 >60 mL/min/1.73 m^2   CBC with Automated Differential    Collection Time: 02/10/19  5:11 AM   Result Value Ref Range    WBC 8.48 3.90 - 12.70 K/uL    RBC 5.31 4.60 - 6.20 M/uL    Hemoglobin 15.7 14.0 - 18.0 g/dL    Hematocrit 48.4 40.0 - 54.0 %    MCV 91 82 - 98 fL    MCH 29.6 27.0 - 31.0 pg    MCHC 32.4 32.0 - 36.0 g/dL    RDW 12.6 11.5 - 14.5 %    Platelets 243 150 - 350 K/uL    MPV 10.9 9.2 - 12.9 fL    Immature Granulocytes 0.4 0.0 - 0.5 %    Gran # (ANC) 3.5 1.8 - 7.7 K/uL    Immature Grans (Abs) 0.03 0.00 - 0.04 K/uL    Lymph # 3.9 1.0 - 4.8 K/uL    Mono # 0.7 0.3 - 1.0 K/uL    Eos # 0.2 0.0 - 0.5 K/uL    Baso # 0.08 0.00 - 0.20 K/uL    nRBC 0 0 /100 WBC    Gran% 41.6 38.0 - 73.0 %    Lymph% 46.2 18.0 - 48.0 %    Mono% 8.1 4.0 - 15.0 %    Eosinophil% 2.8 0.0 - 8.0 %    Basophil% 0.9 0.0 - 1.9 %    Differential Method Automated    APTT    Collection Time: 02/10/19  5:11 AM   Result Value Ref Range    aPTT 52.3 (H) 21.0 - 32.0 sec   Protime-INR    Collection Time: 02/10/19  5:11 AM   Result Value Ref Range    Prothrombin Time 11.2 9.0 - 12.5 sec    INR 1.1 0.8 - 1.2        No results for input(s): POCTGLUCOSE in the last 168 hours.    Hemoglobin A1C   Date Value Ref Range Status   02/05/2019 5.1 4.0 - 5.6 % Final     Comment:     ADA Screening Guidelines:  5.7-6.4%  Consistent with prediabetes  >or=6.5%  Consistent with diabetes  High levels of fetal hemoglobin interfere with the HbA1C  assay. Heterozygous hemoglobin variants (HbS, HgC, etc)do  not significantly interfere with this assay.   However, presence of multiple variants may affect accuracy.     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         amLODIPine  10 mg Oral Daily    aspirin  81 mg Oral Daily    atorvastatin  40 mg Oral Daily    DULoxetine  60 mg Oral Daily    gabapentin  600 mg Oral TID    hydroCHLOROthiazide  12.5 mg Oral Daily    lisinopril  20 mg Oral Daily    naproxen  500 mg Oral BID    nicotine  1 patch Transdermal Daily    senna-docusate 8.6-50 mg  1 tablet Oral BID    warfarin  10 mg Oral Daily     acetaminophen, albuterol-ipratropium, ALPRAZolam, dextrose 50%, dextrose 50%, glucagon (human recombinant), glucose, glucose, heparin (PORCINE), heparin (PORCINE), morphine, ondansetron, oxyCODONE, promethazine, ramelteon, sodium chloride 0.9%, sodium chloride 0.9%      Assessment and Plan for problems addressed today:   1. Vascular complication with history of right below knee amputation  - ESR 43, CRP 8.7, .  - UA RLE arteries and veins unremarkable.  - CTA RLE showed extensive atherosclerosis resulting in focal narrowing at the proximal  aspect of the right popliteal artery likely representing 50% stenosis.  No vascular flow visualized beyond the proximal anterior tibial artery; uncertain if this represents normal postoperative vascular morphology given lack of prior imaging, good flow per vascular sx review  - Appreciate vascular surgery's assistance. No acute surgical intervention planned  - continue heparin drip until INR therapeutic, started coumadin 7.5mg, give 12.5mg tonight  - PCP unable to manage coumadin so will need to establish here with new PCP   - consult pharm to assist with coumadin dosing       2. Tobacco abuse  - Nicoderm patch  - Counseled on cessation      3. Anxiety and depression  - Continue Cymbalta 60mg daily and PRN Xanax 0.5mg TID PRN.     4. HLD (hyperlipidemia)  - Continue Lipitor 40mg daily.     5. Essential hypertension  - Continue amlodipine 10mg daily, HCTZ 12.5mg daily, lisinopril 20mg daily.           Diet: cardiac  DVT PPx: heparin   Code status: FULL     Discharge plan and follow up:  Continue heparin drip and coumadin, awaiting until therpeutic, vascular has no acute surgical plan as of now, leg is warm on exam today, will establish with [facility] PCP to get into coumadin clinic to manage coumadin       Letty Todd MD  [facility] Medicine Staff  [phone] pager

## 2019-02-10 NOTE — PLAN OF CARE
Problem: Adult Inpatient Plan of Care  Goal: Plan of Care Review  Outcome: Ongoing (interventions implemented as appropriate)  Patient AAOx4. VS stable, afrebrile. Neurovascular intact. Skin intact. Free from falls. Frequent rounds made for safety, pain and comfort. Bed at lowest position, call light within reach, side rails up x2.

## 2019-02-11 LAB
ALBUMIN SERPL BCP-MCNC: 4.2 G/DL
ALP SERPL-CCNC: 82 U/L
ALT SERPL W/O P-5'-P-CCNC: 73 U/L
ANION GAP SERPL CALC-SCNC: 9 MMOL/L
ANISOCYTOSIS BLD QL SMEAR: SLIGHT
APTT BLDCRRT: 66.6 SEC
AST SERPL-CCNC: 69 U/L
BASOPHILS # BLD AUTO: 0.07 K/UL
BASOPHILS NFR BLD: 0.8 %
BILIRUB SERPL-MCNC: 0.3 MG/DL
BUN SERPL-MCNC: 15 MG/DL
CALCIUM SERPL-MCNC: 9.9 MG/DL
CHLORIDE SERPL-SCNC: 99 MMOL/L
CO2 SERPL-SCNC: 29 MMOL/L
CREAT SERPL-MCNC: 1 MG/DL
DACRYOCYTES BLD QL SMEAR: NORMAL
DIFFERENTIAL METHOD: NORMAL
EOSINOPHIL # BLD AUTO: 0.3 K/UL
EOSINOPHIL NFR BLD: 2.9 %
ERYTHROCYTE [DISTWIDTH] IN BLOOD BY AUTOMATED COUNT: 12.6 %
EST. GFR  (AFRICAN AMERICAN): >60 ML/MIN/1.73 M^2
EST. GFR  (NON AFRICAN AMERICAN): >60 ML/MIN/1.73 M^2
GLUCOSE SERPL-MCNC: 99 MG/DL
HCT VFR BLD AUTO: 48.7 %
HGB BLD-MCNC: 16.2 G/DL
HYPOCHROMIA BLD QL SMEAR: NORMAL
IMM GRANULOCYTES # BLD AUTO: 0.02 K/UL
IMM GRANULOCYTES NFR BLD AUTO: 0.2 %
INR PPP: 1.3
LYMPHOCYTES # BLD AUTO: 4.2 K/UL
LYMPHOCYTES NFR BLD: 47.4 %
MCH RBC QN AUTO: 30.7 PG
MCHC RBC AUTO-ENTMCNC: 33.3 G/DL
MCV RBC AUTO: 92 FL
MONOCYTES # BLD AUTO: 0.8 K/UL
MONOCYTES NFR BLD: 8.5 %
NEUTROPHILS # BLD AUTO: 3.6 K/UL
NEUTROPHILS NFR BLD: 40.2 %
NRBC BLD-RTO: 0 /100 WBC
OVALOCYTES BLD QL SMEAR: NORMAL
PLATELET # BLD AUTO: 260 K/UL
PMV BLD AUTO: 10.8 FL
POIKILOCYTOSIS BLD QL SMEAR: SLIGHT
POLYCHROMASIA BLD QL SMEAR: NORMAL
POTASSIUM SERPL-SCNC: 4.4 MMOL/L
PROT SERPL-MCNC: 7.8 G/DL
PROTHROMBIN TIME: 13 SEC
RBC # BLD AUTO: 5.28 M/UL
SODIUM SERPL-SCNC: 137 MMOL/L
WBC # BLD AUTO: 8.92 K/UL

## 2019-02-11 PROCEDURE — 25000003 PHARM REV CODE 250: Performed by: PHYSICIAN ASSISTANT

## 2019-02-11 PROCEDURE — 99231 SBSQ HOSP IP/OBS SF/LOW 25: CPT | Mod: ,,, | Performed by: INTERNAL MEDICINE

## 2019-02-11 PROCEDURE — 63600175 PHARM REV CODE 636 W HCPCS: Performed by: PHYSICIAN ASSISTANT

## 2019-02-11 PROCEDURE — 25000003 PHARM REV CODE 250: Performed by: INTERNAL MEDICINE

## 2019-02-11 PROCEDURE — 85610 PROTHROMBIN TIME: CPT

## 2019-02-11 PROCEDURE — 80053 COMPREHEN METABOLIC PANEL: CPT

## 2019-02-11 PROCEDURE — 85730 THROMBOPLASTIN TIME PARTIAL: CPT

## 2019-02-11 PROCEDURE — 11000001 HC ACUTE MED/SURG PRIVATE ROOM

## 2019-02-11 PROCEDURE — 99231 PR SUBSEQUENT HOSPITAL CARE,LEVL I: ICD-10-PCS | Mod: ,,, | Performed by: INTERNAL MEDICINE

## 2019-02-11 PROCEDURE — 25000003 PHARM REV CODE 250: Performed by: HOSPITALIST

## 2019-02-11 PROCEDURE — 85025 COMPLETE CBC W/AUTO DIFF WBC: CPT

## 2019-02-11 PROCEDURE — S4991 NICOTINE PATCH NONLEGEND: HCPCS | Performed by: PHYSICIAN ASSISTANT

## 2019-02-11 RX ADMIN — OXYCODONE HYDROCHLORIDE 10 MG: 10 TABLET ORAL at 02:02

## 2019-02-11 RX ADMIN — OXYCODONE HYDROCHLORIDE 10 MG: 10 TABLET ORAL at 11:02

## 2019-02-11 RX ADMIN — OXYCODONE HYDROCHLORIDE 10 MG: 10 TABLET ORAL at 12:02

## 2019-02-11 RX ADMIN — ASPIRIN 81 MG CHEWABLE TABLET 81 MG: 81 TABLET CHEWABLE at 08:02

## 2019-02-11 RX ADMIN — GABAPENTIN 600 MG: 100 CAPSULE ORAL at 08:02

## 2019-02-11 RX ADMIN — WARFARIN SODIUM 12.5 MG: 2.5 TABLET ORAL at 04:02

## 2019-02-11 RX ADMIN — STANDARDIZED SENNA CONCENTRATE AND DOCUSATE SODIUM 1 TABLET: 8.6; 5 TABLET, FILM COATED ORAL at 08:02

## 2019-02-11 RX ADMIN — OXYCODONE HYDROCHLORIDE 10 MG: 10 TABLET ORAL at 05:02

## 2019-02-11 RX ADMIN — LISINOPRIL 20 MG: 20 TABLET ORAL at 08:02

## 2019-02-11 RX ADMIN — OXYCODONE HYDROCHLORIDE 10 MG: 10 TABLET ORAL at 10:02

## 2019-02-11 RX ADMIN — HYDROCHLOROTHIAZIDE 12.5 MG: 12.5 TABLET ORAL at 08:02

## 2019-02-11 RX ADMIN — OXYCODONE HYDROCHLORIDE 10 MG: 10 TABLET ORAL at 06:02

## 2019-02-11 RX ADMIN — GABAPENTIN 600 MG: 100 CAPSULE ORAL at 02:02

## 2019-02-11 RX ADMIN — NICOTINE 1 PATCH: 21 PATCH, EXTENDED RELEASE TRANSDERMAL at 09:02

## 2019-02-11 RX ADMIN — ATORVASTATIN CALCIUM 40 MG: 10 TABLET, FILM COATED ORAL at 08:02

## 2019-02-11 RX ADMIN — DULOXETINE 60 MG: 60 CAPSULE, DELAYED RELEASE ORAL at 08:02

## 2019-02-11 RX ADMIN — AMLODIPINE BESYLATE 10 MG: 10 TABLET ORAL at 08:02

## 2019-02-11 RX ADMIN — HEPARIN SODIUM 13.98 UNITS/KG/HR: 10000 INJECTION, SOLUTION INTRAVENOUS at 05:02

## 2019-02-11 NOTE — PROGRESS NOTES
Hospital Medicine   Progress note   Team: [facility] [place] HOSP MED O Letty Todd MD   Admit Date: 2/4/2019   COLEEN 2/11/2019   Code status: Full Code   Principal Problem: Vascular complication     Interval hx: No events overnight.  Leg still warm this am.  Bridging coumadin, INR still at 1.3 today, will continue coumadin dose of 12.5mg tonight.  No CP or SOB.      ROS   Constitutional: Negative for chills, fatigue, fever.   HENT: Negative for sore throat, trouble swallowing.    Eyes: Negative for photophobia, visual disturbance.   Respiratory: Negative for cough, shortness of breath.    Cardiovascular: Negative for chest pain, palpitations, leg swelling.   Gastrointestinal: Negative for abdominal pain, constipation, diarrhea, nausea, vomiting.   Endocrine: Negative for cold intolerance, heat intolerance.   Genitourinary: Negative for dysuria, frequency.   Musculoskeletal: + arthralgias, myalgias (RLE)  Skin: Negative for rash, erythema , +skin irritation in RLE  Neurological: Negative for dizziness, syncope, weakness, light-headedness.   Psychiatric/Behavioral: Negative for confusion, hallucinations, anxiety  All other systems reviewed and are negative.    PEx   Temp:  [96.5 °F (35.8 °C)-98.5 °F (36.9 °C)]   Pulse:  [80-94]   Resp:  [14-20]   BP: (116-140)/(69-91)   SpO2:  [93 %-97 %]      I & O (Last 24H):     Intake/Output Summary (Last 24 hours) at 2/11/2019 0748  Last data filed at 2/10/2019 1258  Gross per 24 hour   Intake 895 ml   Output --   Net 895 ml       General appearance: no distress, alert and oriented x 3  HEENT:  conjunctivae/corneas clear, PERRL, mucous membranes moist   Neck: supple, thyroid not enlarged  Pulm:   normal respiratory effort, CTAB, no wheezes, rales or rhonchi  Card: RRR, S1, S2 normal, no murmur, click, rub or gallop  Abd: soft, NT, ND, BS present; no masses, no organomegaly  Ext: R BKA with warmth noted and mild erythema, still less warm than LLE but improving, mild skin irritation at the base  around the site where prosthesis attaches  Pulses: 2+, symmetric  Skin: color, texture, turgor normal. No rashes or lesions  Neuro: CN II-XII grossly intact, no focal numbness or weakness, normal strength and tone   Psych: normal mood and affect      Recent Results (from the past 24 hour(s))   Comprehensive Metabolic Panel (CMP)    Collection Time: 02/11/19  5:37 AM   Result Value Ref Range    Sodium 137 136 - 145 mmol/L    Potassium 4.4 3.5 - 5.1 mmol/L    Chloride 99 95 - 110 mmol/L    CO2 29 23 - 29 mmol/L    Glucose 99 70 - 110 mg/dL    BUN, Bld 15 6 - 20 mg/dL    Creatinine 1.0 0.5 - 1.4 mg/dL    Calcium 9.9 8.7 - 10.5 mg/dL    Total Protein 7.8 6.0 - 8.4 g/dL    Albumin 4.2 3.5 - 5.2 g/dL    Total Bilirubin 0.3 0.1 - 1.0 mg/dL    Alkaline Phosphatase 82 55 - 135 U/L    AST 69 (H) 10 - 40 U/L    ALT 73 (H) 10 - 44 U/L    Anion Gap 9 8 - 16 mmol/L    eGFR if African American >60.0 >60 mL/min/1.73 m^2    eGFR if non African American >60.0 >60 mL/min/1.73 m^2   CBC with Automated Differential    Collection Time: 02/11/19  5:37 AM   Result Value Ref Range    WBC 8.92 3.90 - 12.70 K/uL    RBC 5.28 4.60 - 6.20 M/uL    Hemoglobin 16.2 14.0 - 18.0 g/dL    Hematocrit 48.7 40.0 - 54.0 %    MCV 92 82 - 98 fL    MCH 30.7 27.0 - 31.0 pg    MCHC 33.3 32.0 - 36.0 g/dL    RDW 12.6 11.5 - 14.5 %    Platelets 260 150 - 350 K/uL    MPV 10.8 9.2 - 12.9 fL    Immature Granulocytes 0.2 0.0 - 0.5 %    Gran # (ANC) 3.6 1.8 - 7.7 K/uL    Immature Grans (Abs) 0.02 0.00 - 0.04 K/uL    Lymph # 4.2 1.0 - 4.8 K/uL    Mono # 0.8 0.3 - 1.0 K/uL    Eos # 0.3 0.0 - 0.5 K/uL    Baso # 0.07 0.00 - 0.20 K/uL    nRBC 0 0 /100 WBC    Gran% 40.2 38.0 - 73.0 %    Lymph% 47.4 18.0 - 48.0 %    Mono% 8.5 4.0 - 15.0 %    Eosinophil% 2.9 0.0 - 8.0 %    Basophil% 0.8 0.0 - 1.9 %    Aniso Slight     Poik Slight     Poly Occasional     Hypo Occasional     Ovalocytes Occasional     Tear Drop Cells Occasional     Differential Method Automated    APTT     Collection Time: 02/11/19  5:37 AM   Result Value Ref Range    aPTT 66.6 (H) 21.0 - 32.0 sec   Protime-INR    Collection Time: 02/11/19  5:37 AM   Result Value Ref Range    Prothrombin Time 13.0 (H) 9.0 - 12.5 sec    INR 1.3 (H) 0.8 - 1.2       No results for input(s): POCTGLUCOSE in the last 168 hours.    Hemoglobin A1C   Date Value Ref Range Status   02/05/2019 5.1 4.0 - 5.6 % Final     Comment:     ADA Screening Guidelines:  5.7-6.4%  Consistent with prediabetes  >or=6.5%  Consistent with diabetes  High levels of fetal hemoglobin interfere with the HbA1C  assay. Heterozygous hemoglobin variants (HbS, HgC, etc)do  not significantly interfere with this assay.   However, presence of multiple variants may affect accuracy.     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         amLODIPine  10 mg Oral Daily    aspirin  81 mg Oral Daily    atorvastatin  40 mg Oral Daily    DULoxetine  60 mg Oral Daily    gabapentin  600 mg Oral TID    hydroCHLOROthiazide  12.5 mg Oral Daily    lisinopril  20 mg Oral Daily    nicotine  1 patch Transdermal Daily    senna-docusate 8.6-50 mg  1 tablet Oral BID    warfarin  12.5 mg Oral Daily     acetaminophen, albuterol-ipratropium, ALPRAZolam, dextrose 50%, dextrose 50%, glucagon (human recombinant), glucose, glucose, heparin (PORCINE), heparin (PORCINE), ondansetron, oxyCODONE, oxyCODONE, promethazine, ramelteon, sodium chloride 0.9%, sodium chloride 0.9%      Assessment and Plan for problems addressed today:   1. Vascular  complication with history of right below knee amputation  - ESR 43, CRP 8.7, .  - UA RLE arteries and veins unremarkable.  - CTA RLE showed extensive atherosclerosis resulting in focal narrowing at the proximal aspect of the right popliteal artery likely representing 50% stenosis.  No vascular flow visualized beyond the proximal anterior tibial artery; uncertain if this represents normal postoperative vascular morphology given lack of prior imaging, good flow per vascular sx review  - Appreciate vascular surgery's assistance. No acute surgical intervention planned  - continue heparin drip until INR therapeutic, started coumadin 7.5mg, give another 12.5mg tonight  - PCP unable to manage coumadin so will need to establish here with new PCP   - consult pharm to assist with coumadin dosing       2. Tobacco abuse  - Nicoderm patch  - Counseled on cessation      3. Anxiety and depression  - Continue Cymbalta 60mg daily and PRN Xanax 0.5mg TID PRN.     4. HLD (hyperlipidemia)  - Continue Lipitor 40mg daily.     5. Essential hypertension  - Continue amlodipine 10mg daily, HCTZ 12.5mg daily, lisinopril 20mg daily.           Diet: cardiac  DVT PPx: heparin   Code status: FULL     Discharge plan and follow up:  Continue heparin drip and coumadin, awaiting until therpeutic, INR at 1.3, vascular has no acute surgical plan as of now, leg is warm on exam today, will establish with [facility] PCP to get into coumadin clinic to manage coumadin       Letty Todd MD  [facility] Medicine Staff  [phone] pager

## 2019-02-11 NOTE — PLAN OF CARE
Problem: Adult Inpatient Plan of Care  Goal: Plan of Care Review  Outcome: Ongoing (interventions implemented as appropriate)  Pt aaox4, vs stable, no falls or injuries. Fall precautions in place w/ personal items near by. Pain level being monitor and control by medication. No signs of infection or distress. Call light in reach; will continue to monitor pt.

## 2019-02-11 NOTE — PROGRESS NOTES
PHARMACY CONSULT NOTE: WARFARIN  Ren Grove is a 38 y.o. male on warfarin therapy for right leg stenosis. PharmD has been consulted for warfarin dosing.    Current order: 12.5 mg daily  Home dose: never on warfarin before  Coumadin clinic enrollment: Case mgmt set up pt with [facility]CP so now patient has been referred to our CC  INR goal: 2-3    Lab Results   Component Value Date    INR 1.3 (H) 02/11/2019    INR 1.1 02/10/2019    INR 1.1 02/09/2019       Diet: Adult Cardiac    Recommendation(s):    INR 1.3 today, patient received 10mg on Saturday and 12.5mg on Sunday. Would continue 12.5mg PO tonight.   APTT therapeutic, 66.6 this AM. Goal 39-69.  · Case mgmt set up pt with [facility] PCP so now patient has been referred to our CC.   Pharmacy will continue to follow and monitor warfarin    Thank you for the consult,  Clara Saunders, PharmD, BCPS  [id number]    **Note: Consults are reviewed Monday-Friday 7:00am-3:30pm. THE ABOVE RECOMMENDATIONS ARE ONLY SUGGESTED.THE RECOMMENDATIONS SHOULD BE CONSIDERED IN CONJUNCTION WITH ALL PATIENT FACTORS. **

## 2019-02-12 LAB
ALBUMIN SERPL BCP-MCNC: 4.3 G/DL
ALP SERPL-CCNC: 89 U/L
ALT SERPL W/O P-5'-P-CCNC: 71 U/L
ANION GAP SERPL CALC-SCNC: 12 MMOL/L
APTT BLDCRRT: 56 SEC
APTT BLDCRRT: 59.5 SEC
APTT BLDCRRT: 71 SEC
AST SERPL-CCNC: 54 U/L
BASOPHILS # BLD AUTO: 0.08 K/UL
BASOPHILS NFR BLD: 0.8 %
BILIRUB SERPL-MCNC: 0.4 MG/DL
BUN SERPL-MCNC: 14 MG/DL
CALCIUM SERPL-MCNC: 10 MG/DL
CHLORIDE SERPL-SCNC: 101 MMOL/L
CO2 SERPL-SCNC: 25 MMOL/L
CREAT SERPL-MCNC: 0.8 MG/DL
DIFFERENTIAL METHOD: ABNORMAL
EOSINOPHIL # BLD AUTO: 0.4 K/UL
EOSINOPHIL NFR BLD: 4 %
ERYTHROCYTE [DISTWIDTH] IN BLOOD BY AUTOMATED COUNT: 12.6 %
EST. GFR  (AFRICAN AMERICAN): >60 ML/MIN/1.73 M^2
EST. GFR  (NON AFRICAN AMERICAN): >60 ML/MIN/1.73 M^2
GLUCOSE SERPL-MCNC: 97 MG/DL
HCT VFR BLD AUTO: 48.9 %
HGB BLD-MCNC: 16.5 G/DL
IMM GRANULOCYTES # BLD AUTO: 0.05 K/UL
IMM GRANULOCYTES NFR BLD AUTO: 0.5 %
INR PPP: 1.5
LYMPHOCYTES # BLD AUTO: 4.1 K/UL
LYMPHOCYTES NFR BLD: 43 %
MCH RBC QN AUTO: 30.4 PG
MCHC RBC AUTO-ENTMCNC: 33.7 G/DL
MCV RBC AUTO: 90 FL
MONOCYTES # BLD AUTO: 1 K/UL
MONOCYTES NFR BLD: 10.7 %
NEUTROPHILS # BLD AUTO: 3.9 K/UL
NEUTROPHILS NFR BLD: 41 %
NRBC BLD-RTO: 0 /100 WBC
PLATELET # BLD AUTO: 247 K/UL
PLATELET BLD QL SMEAR: ABNORMAL
PMV BLD AUTO: 11.1 FL
POTASSIUM SERPL-SCNC: 4.4 MMOL/L
PROT SERPL-MCNC: 7.8 G/DL
PROTHROMBIN TIME: 14.9 SEC
RBC # BLD AUTO: 5.43 M/UL
SMUDGE CELLS BLD QL SMEAR: PRESENT
SODIUM SERPL-SCNC: 138 MMOL/L
WBC # BLD AUTO: 9.62 K/UL

## 2019-02-12 PROCEDURE — 25000003 PHARM REV CODE 250: Performed by: INTERNAL MEDICINE

## 2019-02-12 PROCEDURE — 25000003 PHARM REV CODE 250: Performed by: PHYSICIAN ASSISTANT

## 2019-02-12 PROCEDURE — S4991 NICOTINE PATCH NONLEGEND: HCPCS | Performed by: PHYSICIAN ASSISTANT

## 2019-02-12 PROCEDURE — 63600175 PHARM REV CODE 636 W HCPCS: Performed by: PHYSICIAN ASSISTANT

## 2019-02-12 PROCEDURE — 80053 COMPREHEN METABOLIC PANEL: CPT

## 2019-02-12 PROCEDURE — 85730 THROMBOPLASTIN TIME PARTIAL: CPT

## 2019-02-12 PROCEDURE — 85610 PROTHROMBIN TIME: CPT

## 2019-02-12 PROCEDURE — 36415 COLL VENOUS BLD VENIPUNCTURE: CPT

## 2019-02-12 PROCEDURE — 85025 COMPLETE CBC W/AUTO DIFF WBC: CPT

## 2019-02-12 PROCEDURE — 11000001 HC ACUTE MED/SURG PRIVATE ROOM

## 2019-02-12 PROCEDURE — 99231 PR SUBSEQUENT HOSPITAL CARE,LEVL I: ICD-10-PCS | Mod: ,,, | Performed by: INTERNAL MEDICINE

## 2019-02-12 PROCEDURE — 99231 SBSQ HOSP IP/OBS SF/LOW 25: CPT | Mod: ,,, | Performed by: INTERNAL MEDICINE

## 2019-02-12 PROCEDURE — 25000003 PHARM REV CODE 250: Performed by: HOSPITALIST

## 2019-02-12 RX ADMIN — OXYCODONE HYDROCHLORIDE 10 MG: 10 TABLET ORAL at 05:02

## 2019-02-12 RX ADMIN — DULOXETINE 60 MG: 60 CAPSULE, DELAYED RELEASE ORAL at 09:02

## 2019-02-12 RX ADMIN — ASPIRIN 81 MG CHEWABLE TABLET 81 MG: 81 TABLET CHEWABLE at 09:02

## 2019-02-12 RX ADMIN — GABAPENTIN 600 MG: 100 CAPSULE ORAL at 09:02

## 2019-02-12 RX ADMIN — LISINOPRIL 20 MG: 20 TABLET ORAL at 09:02

## 2019-02-12 RX ADMIN — STANDARDIZED SENNA CONCENTRATE AND DOCUSATE SODIUM 1 TABLET: 8.6; 5 TABLET, FILM COATED ORAL at 08:02

## 2019-02-12 RX ADMIN — WARFARIN SODIUM 12.5 MG: 2.5 TABLET ORAL at 05:02

## 2019-02-12 RX ADMIN — OXYCODONE HYDROCHLORIDE 10 MG: 10 TABLET ORAL at 09:02

## 2019-02-12 RX ADMIN — HEPARIN SODIUM 12 UNITS/KG/HR: 10000 INJECTION, SOLUTION INTRAVENOUS at 07:02

## 2019-02-12 RX ADMIN — OXYCODONE HYDROCHLORIDE 10 MG: 10 TABLET ORAL at 01:02

## 2019-02-12 RX ADMIN — GABAPENTIN 600 MG: 100 CAPSULE ORAL at 08:02

## 2019-02-12 RX ADMIN — GABAPENTIN 600 MG: 100 CAPSULE ORAL at 02:02

## 2019-02-12 RX ADMIN — ATORVASTATIN CALCIUM 40 MG: 10 TABLET, FILM COATED ORAL at 09:02

## 2019-02-12 RX ADMIN — HYDROCHLOROTHIAZIDE 12.5 MG: 12.5 TABLET ORAL at 09:02

## 2019-02-12 RX ADMIN — STANDARDIZED SENNA CONCENTRATE AND DOCUSATE SODIUM 1 TABLET: 8.6; 5 TABLET, FILM COATED ORAL at 09:02

## 2019-02-12 RX ADMIN — NICOTINE 1 PATCH: 21 PATCH, EXTENDED RELEASE TRANSDERMAL at 09:02

## 2019-02-12 RX ADMIN — HEPARIN SODIUM 12 UNITS/KG/HR: 10000 INJECTION, SOLUTION INTRAVENOUS at 06:02

## 2019-02-12 RX ADMIN — OXYCODONE HYDROCHLORIDE 10 MG: 10 TABLET ORAL at 04:02

## 2019-02-12 RX ADMIN — AMLODIPINE BESYLATE 10 MG: 10 TABLET ORAL at 09:02

## 2019-02-12 NOTE — PROGRESS NOTES
Hospital Medicine   Progress note   Team: [facility] [place] [facility] O Letty Todd MD   Admit Date: 2/4/2019   COLEEN 2/12/2019   Code status: Full Code   Principal Problem: Vascular complication     Interval hx: No events overnight.  Leg still warm this am.  Bridging coumadin, INR up to 1.5 today, will continue coumadin dose of 12.5mg tonight.  No CP or SOB.      ROS   Constitutional: Negative for chills, fatigue, fever.   HENT: Negative for sore throat, trouble swallowing.    Eyes: Negative for photophobia, visual disturbance.   Respiratory: Negative for cough, shortness of breath.    Cardiovascular: Negative for chest pain, palpitations, leg swelling.   Gastrointestinal: Negative for abdominal pain, constipation, diarrhea, nausea, vomiting.   Endocrine: Negative for cold intolerance, heat intolerance.   Genitourinary: Negative for dysuria, frequency.   Musculoskeletal: + arthralgias, myalgias (RLE)  Skin: Negative for rash, erythema , +skin irritation in RLE  Neurological: Negative for dizziness, syncope, weakness, light-headedness.   Psychiatric/Behavioral: Negative for confusion, hallucinations, anxiety  All other systems reviewed and are negative.    PEx   Temp:  [96.5 °F (35.8 °C)-98.3 °F (36.8 °C)]   Pulse:  []   Resp:  [16-18]   BP: (129-146)/(63-84)   SpO2:  [95 %-98 %]      I & O (Last 24H):     Intake/Output Summary (Last 24 hours) at 2/12/2019 0800  Last data filed at 2/11/2019 1800  Gross per 24 hour   Intake 126 ml   Output --   Net 126 ml       General appearance: no distress, alert and oriented x 3  HEENT:  conjunctivae/corneas clear, PERRL, mucous membranes moist   Neck: supple, thyroid not enlarged  Pulm:   normal respiratory effort, CTAB, no wheezes, rales or rhonchi  Card: RRR, S1, S2 normal, no murmur, click, rub or gallop  Abd: soft, NT, ND, BS present; no masses, no organomegaly  Ext: R BKA with warmth noted and mild erythema, still less warm than LLE but improving, mild skin irritation at the base  around the site where prosthesis attaches  Pulses: 2+, symmetric  Skin: color, texture, turgor normal. No rashes or lesions  Neuro: CN II-XII grossly intact, no focal numbness or weakness, normal strength and tone   Psych: normal mood and affect      Recent Results (from the past 24 hour(s))   Comprehensive Metabolic Panel (CMP)    Collection Time: 02/12/19  6:02 AM   Result Value Ref Range    Sodium 138 136 - 145 mmol/L    Potassium 4.4 3.5 - 5.1 mmol/L    Chloride 101 95 - 110 mmol/L    CO2 25 23 - 29 mmol/L    Glucose 97 70 - 110 mg/dL    BUN, Bld 14 6 - 20 mg/dL    Creatinine 0.8 0.5 - 1.4 mg/dL    Calcium 10.0 8.7 - 10.5 mg/dL    Total Protein 7.8 6.0 - 8.4 g/dL    Albumin 4.3 3.5 - 5.2 g/dL    Total Bilirubin 0.4 0.1 - 1.0 mg/dL    Alkaline Phosphatase 89 55 - 135 U/L    AST 54 (H) 10 - 40 U/L    ALT 71 (H) 10 - 44 U/L    Anion Gap 12 8 - 16 mmol/L    eGFR if African American >60.0 >60 mL/min/1.73 m^2    eGFR if non African American >60.0 >60 mL/min/1.73 m^2   CBC with Automated Differential    Collection Time: 02/12/19  6:02 AM   Result Value Ref Range    WBC 9.62 3.90 - 12.70 K/uL    RBC 5.43 4.60 - 6.20 M/uL    Hemoglobin 16.5 14.0 - 18.0 g/dL    Hematocrit 48.9 40.0 - 54.0 %    MCV 90 82 - 98 fL    MCH 30.4 27.0 - 31.0 pg    MCHC 33.7 32.0 - 36.0 g/dL    RDW 12.6 11.5 - 14.5 %    Platelets 247 150 - 350 K/uL    MPV 11.1 9.2 - 12.9 fL   APTT    Collection Time: 02/12/19  6:02 AM   Result Value Ref Range    aPTT 71.0 (H) 21.0 - 32.0 sec   Protime-INR    Collection Time: 02/12/19  6:02 AM   Result Value Ref Range    Prothrombin Time 14.9 (H) 9.0 - 12.5 sec    INR 1.5 (H) 0.8 - 1.2       No results for input(s): POCTGLUCOSE in the last 168 hours.    Hemoglobin A1C   Date Value Ref Range Status   02/05/2019 5.1 4.0 - 5.6 % Final     Comment:     ADA Screening Guidelines:  5.7-6.4%  Consistent with prediabetes  >or=6.5%  Consistent with diabetes  High levels of fetal hemoglobin interfere with the HbA1C  assay.  Heterozygous hemoglobin variants (HbS, HgC, etc)do  not significantly interfere with this assay.   However, presence of multiple variants may affect accuracy.     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         amLODIPine  10 mg Oral Daily    aspirin  81 mg Oral Daily    atorvastatin  40 mg Oral Daily    DULoxetine  60 mg Oral Daily    gabapentin  600 mg Oral TID    hydroCHLOROthiazide  12.5 mg Oral Daily    lisinopril  20 mg Oral Daily    nicotine  1 patch Transdermal Daily    senna-docusate 8.6-50 mg  1 tablet Oral BID    warfarin  12.5 mg Oral Daily     acetaminophen, albuterol-ipratropium, ALPRAZolam, dextrose 50%, dextrose 50%, glucagon (human recombinant), glucose, glucose, heparin (PORCINE), heparin (PORCINE), ondansetron, oxyCODONE, oxyCODONE, promethazine, ramelteon, sodium chloride 0.9%, sodium chloride 0.9%      Assessment and Plan for problems addressed today:   1. Vascular complication with history of right below knee amputation  - ESR 43, CRP 8.7, .  - UA RLE arteries and veins unremarkable.  - CTA RLE showed extensive atherosclerosis resulting in focal narrowing at the proximal aspect of the right popliteal artery likely representing 50% stenosis.  No vascular flow visualized beyond the proximal anterior tibial artery; uncertain if this represents normal postoperative vascular morphology given lack of prior imaging, good flow per vascular sx review  - Appreciate vascular surgery's assistance. No acute surgical intervention  planned  - continue heparin drip until INR therapeutic, started coumadin 7.5mg, give another 12.5mg tonight  - PCP unable to manage coumadin so will need to establish here with new PCP   - consult pharm to assist with coumadin dosing       2. Tobacco abuse  - Nicoderm patch  - Counseled on cessation      3. Anxiety and depression  - Continue Cymbalta 60mg daily and PRN Xanax 0.5mg TID PRN.     4. HLD (hyperlipidemia)  - Continue Lipitor 40mg daily.     5. Essential hypertension  - Continue amlodipine 10mg daily, HCTZ 12.5mg daily, lisinopril 20mg daily.           Diet: cardiac  DVT PPx: heparin   Code status: FULL     Discharge plan and follow up:  Continue heparin drip and coumadin, awaiting until therpeutic, INR at 1.5, vascular has no acute surgical plan as of now, leg is warm on exam today, will establish with [facility] PCP to get into coumadin clinic to manage coumadin        Letty Todd MD  Hospital Medicine Staff  [phone] pager

## 2019-02-12 NOTE — DISCHARGE INSTRUCTIONS
Go to [facility] at discharge to get liner for prosthesis, should be $25 and they will fit you there    [address] [place] [address]

## 2019-02-12 NOTE — PLAN OF CARE
02/12/19 1329   Discharge Reassessment   Assessment Type Discharge Planning Reassessment   Discharge Plan A Home with family     Awaiting therapeutic level for discharge.  Patient established with [facility]CP and enrolled in [facility].

## 2019-02-12 NOTE — PROGRESS NOTES
PHARMACY CONSULT NOTE: WARFARIN  Ren Grove is a 38 y.o. male on warfarin therapy for right leg stenosis. PharmD has been consulted for warfarin dosing.    Current order: 12.5mg daily  Home dose: never on warfarin before  Coumadin clinic enrollment: Case mgmt set up pt with [facility] so now patient has been referred to our CC  INR goal: 2-3    Lab Results   Component Value Date    INR 1.5 (H) 02/12/2019    INR 1.3 (H) 02/11/2019    INR 1.1 02/10/2019       Diet: Adult Cardiac    Recommendation(s):    INR 1.5 today, after 2 doses of 12.5mg. Would continue 12.5mg PO tonight.   APTT slighlty supratherapeutic, 70 this AM. Goal 39-69.  · Case mgmt set up pt with [facility] so now patient has been referred to our CC.   Pharmacy will continue to follow and monitor warfarin    Thank you for the consult,  Clara Saunders, PharmD, BCPS  [phone]    **Note: Consults are reviewed Monday-Friday 7:00am-3:30pm. THE ABOVE RECOMMENDATIONS ARE ONLY SUGGESTED.THE RECOMMENDATIONS SHOULD BE CONSIDERED IN CONJUNCTION WITH ALL PATIENT FACTORS. **

## 2019-02-13 LAB
ALBUMIN SERPL BCP-MCNC: 4.4 G/DL
ALP SERPL-CCNC: 94 U/L
ALT SERPL W/O P-5'-P-CCNC: 63 U/L
ANION GAP SERPL CALC-SCNC: 10 MMOL/L
ANISOCYTOSIS BLD QL SMEAR: SLIGHT
APTT BLDCRRT: 59.4 SEC
AST SERPL-CCNC: 39 U/L
BASOPHILS # BLD AUTO: 0.11 K/UL
BASOPHILS NFR BLD: 1.2 %
BILIRUB SERPL-MCNC: 0.4 MG/DL
BUN SERPL-MCNC: 14 MG/DL
CALCIUM SERPL-MCNC: 10.5 MG/DL
CHLORIDE SERPL-SCNC: 100 MMOL/L
CO2 SERPL-SCNC: 30 MMOL/L
CREAT SERPL-MCNC: 0.9 MG/DL
DIFFERENTIAL METHOD: NORMAL
EOSINOPHIL # BLD AUTO: 0.3 K/UL
EOSINOPHIL NFR BLD: 3.6 %
ERYTHROCYTE [DISTWIDTH] IN BLOOD BY AUTOMATED COUNT: 12.5 %
EST. GFR  (AFRICAN AMERICAN): >60 ML/MIN/1.73 M^2
EST. GFR  (NON AFRICAN AMERICAN): >60 ML/MIN/1.73 M^2
GLUCOSE SERPL-MCNC: 99 MG/DL
HCT VFR BLD AUTO: 51.8 %
HGB BLD-MCNC: 17 G/DL
HYPOCHROMIA BLD QL SMEAR: NORMAL
IMM GRANULOCYTES # BLD AUTO: 0.02 K/UL
IMM GRANULOCYTES NFR BLD AUTO: 0.2 %
INR PPP: 1.6
LYMPHOCYTES # BLD AUTO: 4.4 K/UL
LYMPHOCYTES NFR BLD: 46.9 %
MCH RBC QN AUTO: 30.6 PG
MCHC RBC AUTO-ENTMCNC: 32.8 G/DL
MCV RBC AUTO: 93 FL
MONOCYTES # BLD AUTO: 0.8 K/UL
MONOCYTES NFR BLD: 8.8 %
NEUTROPHILS # BLD AUTO: 3.7 K/UL
NEUTROPHILS NFR BLD: 39.3 %
NRBC BLD-RTO: 0 /100 WBC
OVALOCYTES BLD QL SMEAR: NORMAL
PLATELET # BLD AUTO: 256 K/UL
PMV BLD AUTO: 11.7 FL
POIKILOCYTOSIS BLD QL SMEAR: SLIGHT
POLYCHROMASIA BLD QL SMEAR: NORMAL
POTASSIUM SERPL-SCNC: 4.5 MMOL/L
PROT SERPL-MCNC: 8.1 G/DL
PROTHROMBIN TIME: 15.7 SEC
RBC # BLD AUTO: 5.56 M/UL
SODIUM SERPL-SCNC: 140 MMOL/L
WBC # BLD AUTO: 9.36 K/UL

## 2019-02-13 PROCEDURE — 25000003 PHARM REV CODE 250: Performed by: STUDENT IN AN ORGANIZED HEALTH CARE EDUCATION/TRAINING PROGRAM

## 2019-02-13 PROCEDURE — 25000003 PHARM REV CODE 250: Performed by: PHYSICIAN ASSISTANT

## 2019-02-13 PROCEDURE — 36415 COLL VENOUS BLD VENIPUNCTURE: CPT

## 2019-02-13 PROCEDURE — 99232 PR SUBSEQUENT HOSPITAL CARE,LEVL II: ICD-10-PCS | Mod: GC,,, | Performed by: HOSPITALIST

## 2019-02-13 PROCEDURE — 25000003 PHARM REV CODE 250: Performed by: HOSPITALIST

## 2019-02-13 PROCEDURE — 85025 COMPLETE CBC W/AUTO DIFF WBC: CPT

## 2019-02-13 PROCEDURE — 80053 COMPREHEN METABOLIC PANEL: CPT

## 2019-02-13 PROCEDURE — 85730 THROMBOPLASTIN TIME PARTIAL: CPT

## 2019-02-13 PROCEDURE — 11000001 HC ACUTE MED/SURG PRIVATE ROOM

## 2019-02-13 PROCEDURE — 25000003 PHARM REV CODE 250: Performed by: INTERNAL MEDICINE

## 2019-02-13 PROCEDURE — S4991 NICOTINE PATCH NONLEGEND: HCPCS | Performed by: PHYSICIAN ASSISTANT

## 2019-02-13 PROCEDURE — 99232 SBSQ HOSP IP/OBS MODERATE 35: CPT | Mod: GC,,, | Performed by: HOSPITALIST

## 2019-02-13 PROCEDURE — 85610 PROTHROMBIN TIME: CPT

## 2019-02-13 PROCEDURE — 63600175 PHARM REV CODE 636 W HCPCS: Performed by: PHYSICIAN ASSISTANT

## 2019-02-13 RX ORDER — WARFARIN SODIUM 5 MG/1
15 TABLET ORAL DAILY
Status: DISCONTINUED | OUTPATIENT
Start: 2019-02-13 | End: 2019-02-15 | Stop reason: HOSPADM

## 2019-02-13 RX ADMIN — ATORVASTATIN CALCIUM 40 MG: 10 TABLET, FILM COATED ORAL at 08:02

## 2019-02-13 RX ADMIN — STANDARDIZED SENNA CONCENTRATE AND DOCUSATE SODIUM 1 TABLET: 8.6; 5 TABLET, FILM COATED ORAL at 09:02

## 2019-02-13 RX ADMIN — NICOTINE 1 PATCH: 21 PATCH, EXTENDED RELEASE TRANSDERMAL at 08:02

## 2019-02-13 RX ADMIN — OXYCODONE HYDROCHLORIDE 10 MG: 10 TABLET ORAL at 02:02

## 2019-02-13 RX ADMIN — OXYCODONE HYDROCHLORIDE 10 MG: 10 TABLET ORAL at 01:02

## 2019-02-13 RX ADMIN — AMLODIPINE BESYLATE 10 MG: 10 TABLET ORAL at 08:02

## 2019-02-13 RX ADMIN — GABAPENTIN 600 MG: 100 CAPSULE ORAL at 08:02

## 2019-02-13 RX ADMIN — OXYCODONE HYDROCHLORIDE 10 MG: 10 TABLET ORAL at 06:02

## 2019-02-13 RX ADMIN — STANDARDIZED SENNA CONCENTRATE AND DOCUSATE SODIUM 1 TABLET: 8.6; 5 TABLET, FILM COATED ORAL at 08:02

## 2019-02-13 RX ADMIN — WARFARIN SODIUM 15 MG: 5 TABLET ORAL at 05:02

## 2019-02-13 RX ADMIN — HYDROCHLOROTHIAZIDE 12.5 MG: 12.5 TABLET ORAL at 08:02

## 2019-02-13 RX ADMIN — OXYCODONE HYDROCHLORIDE 10 MG: 10 TABLET ORAL at 10:02

## 2019-02-13 RX ADMIN — ASPIRIN 81 MG CHEWABLE TABLET 81 MG: 81 TABLET CHEWABLE at 08:02

## 2019-02-13 RX ADMIN — OXYCODONE HYDROCHLORIDE 10 MG: 10 TABLET ORAL at 05:02

## 2019-02-13 RX ADMIN — GABAPENTIN 600 MG: 100 CAPSULE ORAL at 02:02

## 2019-02-13 RX ADMIN — DULOXETINE 60 MG: 60 CAPSULE, DELAYED RELEASE ORAL at 08:02

## 2019-02-13 RX ADMIN — GABAPENTIN 600 MG: 100 CAPSULE ORAL at 09:02

## 2019-02-13 RX ADMIN — HEPARIN SODIUM 11.99 UNITS/KG/HR: 10000 INJECTION, SOLUTION INTRAVENOUS at 09:02

## 2019-02-13 RX ADMIN — LISINOPRIL 20 MG: 20 TABLET ORAL at 08:02

## 2019-02-13 NOTE — PROGRESS NOTES
PHARMACY CONSULT NOTE: WARFARIN  Ren Grove is a 38 y.o. male on warfarin therapy for right leg stenosis. PharmD has been consulted for warfarin dosing.     Current order: 12.5mg daily  Home dose: never on warfarin before  Coumadin clinic enrollment: Case mgmt set up pt with [facility] PCP so now patient has been referred to our CC  INR goal: 2-3           Lab Results   Component Value Date    INR 1.6 (H) 02/13/2019     INR 1.5 (H) 02/12/2019     INR 1.3 (H) 02/11/2019         Diet: Adult Cardiac     Recommendation(s):   · INR 1.6 today, after 3 doses of 12.5mg. Would increase to 15 mg PO tonight.  · APTT therapeutic, 59.4 this AM. Goal 39-69.  ? Case mgmt set up pt with [facility] PCP so now patient has been referred to our CC.  · Pharmacy will continue to follow and monitor warfarin     Thank you for the consult,  Marques OlivaD  PGY-2 Internal Medicine Pharmacy Resident  EXT [phone]       **Note: Consults are reviewed Monday-Friday 7:00am-3:30pm. THE ABOVE RECOMMENDATIONS ARE ONLY SUGGESTED.THE RECOMMENDATIONS SHOULD BE CONSIDERED IN CONJUNCTION WITH ALL PATIENT FACTORS. **

## 2019-02-13 NOTE — PROGRESS NOTES
Hospital Medicine   Progress note   Team: [facility] [place] [facility] O Helen Kelsey MD   Admit Date: 2/4/2019   COLEEN 2/13/2019   Code status: Full Code   Principal Problem: Vascular complication     Interval hx: No events overnight.  Leg still warm this am.  Bridging coumadin, INR up to 1.6 today.  No CP or SOB.      ROS   Constitutional: Negative for chills, fatigue, fever.   HENT: Negative for sore throat, trouble swallowing.    Eyes: Negative for photophobia, visual disturbance.   Respiratory: Negative for cough, shortness of breath.    Cardiovascular: Negative for chest pain, palpitations, leg swelling.   Gastrointestinal: Negative for abdominal pain, constipation, diarrhea, nausea, vomiting.   Endocrine: Negative for cold intolerance, heat intolerance.   Genitourinary: Negative for dysuria, frequency.   Musculoskeletal: + arthralgias, myalgias (RLE)  Skin: Negative for rash, erythema , +skin irritation in RLE  Neurological: Negative for dizziness, syncope, weakness, light-headedness.   Psychiatric/Behavioral: Negative for confusion, hallucinations, anxiety  All other systems reviewed and are negative.    PEx   Temp:  [96.4 °F (35.8 °C)-98.2 °F (36.8 °C)]   Pulse:  []   Resp:  [16-20]   BP: (119-140)/(76-87)   SpO2:  [95 %-98 %]      I & O (Last 24H):   No intake or output data in the 24 hours ending 02/13/19 0958    General appearance: no distress, alert and oriented x 3  HEENT:  conjunctivae/corneas clear, PERRL, mucous membranes moist   Neck: supple, thyroid not enlarged  Pulm:   normal respiratory effort, CTAB, no wheezes, rales or rhonchi  Card: RRR, S1, S2 normal, no murmur, click, rub or gallop  Abd: soft, NT, ND, BS present; no masses, no organomegaly  Ext: R BKA with warmth noted, mild skin irritation at the base around the site where prosthesis attaches, improving  Pulses: 2+, symmetric  Skin: color, texture, turgor normal. No rashes or lesions  Neuro: CN II-XII grossly intact, no focal numbness or  weakness, normal strength and tone   Psych: normal mood and affect      Recent Results (from the past 24 hour(s))   APTT    Collection Time: 02/12/19  1:05 PM   Result Value Ref Range    aPTT 59.5 (H) 21.0 - 32.0 sec   APTT    Collection Time: 02/12/19  7:42 PM   Result Value Ref Range    aPTT 56.0 (H) 21.0 - 32.0 sec   Comprehensive Metabolic Panel (CMP)    Collection Time: 02/13/19  6:06 AM   Result Value Ref Range    Sodium 140 136 - 145 mmol/L    Potassium 4.5 3.5 - 5.1 mmol/L    Chloride 100 95 - 110 mmol/L    CO2 30 (H) 23 - 29 mmol/L    Glucose 99 70 - 110 mg/dL    BUN, Bld 14 6 - 20 mg/dL    Creatinine 0.9 0.5 - 1.4 mg/dL    Calcium 10.5 8.7 - 10.5 mg/dL    Total Protein 8.1 6.0 - 8.4 g/dL    Albumin 4.4 3.5 - 5.2 g/dL    Total Bilirubin 0.4 0.1 - 1.0 mg/dL    Alkaline Phosphatase 94 55 - 135 U/L    AST 39 10 - 40 U/L    ALT 63 (H) 10 - 44 U/L    Anion Gap 10 8 - 16 mmol/L    eGFR if African American >60.0 >60 mL/min/1.73 m^2    eGFR if non African American >60.0 >60 mL/min/1.73 m^2   CBC with Automated Differential    Collection Time: 02/13/19  6:06 AM   Result Value Ref Range    WBC 9.36 3.90 - 12.70 K/uL    RBC 5.56 4.60 - 6.20 M/uL    Hemoglobin 17.0 14.0 - 18.0 g/dL    Hematocrit 51.8 40.0 - 54.0 %    MCV 93 82 - 98 fL    MCH 30.6 27.0 - 31.0 pg    MCHC 32.8 32.0 - 36.0 g/dL    RDW 12.5 11.5 - 14.5 %    Platelets 256 150 - 350 K/uL    MPV 11.7 9.2 - 12.9 fL    Immature Granulocytes 0.2 0.0 - 0.5 %    Gran # (ANC) 3.7 1.8 - 7.7 K/uL    Immature Grans (Abs) 0.02 0.00 - 0.04 K/uL    Lymph # 4.4 1.0 - 4.8 K/uL    Mono # 0.8 0.3 - 1.0 K/uL    Eos # 0.3 0.0 - 0.5 K/uL    Baso # 0.11 0.00 - 0.20 K/uL    nRBC 0 0 /100 WBC    Gran% 39.3 38.0 - 73.0 %    Lymph% 46.9 18.0 - 48.0 %    Mono% 8.8 4.0 - 15.0 %    Eosinophil% 3.6 0.0 - 8.0 %    Basophil% 1.2 0.0 - 1.9 %    Aniso Slight     Poik Slight     Poly Occasional     Hypo Occasional     Ovalocytes Occasional     Differential Method Automated    APTT     Collection Time: 02/13/19  6:06 AM   Result Value Ref Range    aPTT 59.4 (H) 21.0 - 32.0 sec   Protime-INR    Collection Time: 02/13/19  6:06 AM   Result Value Ref Range    Prothrombin Time 15.7 (H) 9.0 - 12.5 sec    INR 1.6 (H) 0.8 - 1.2       No results for input(s): POCTGLUCOSE in the last 168 hours.    Hemoglobin A1C   Date Value Ref Range Status   02/05/2019 5.1 4.0 - 5.6 % Final     Comment:     ADA Screening Guidelines:  5.7-6.4%  Consistent with prediabetes  >or=6.5%  Consistent with diabetes  High levels of fetal hemoglobin interfere with the HbA1C  assay. Heterozygous hemoglobin variants (HbS, HgC, etc)do  not significantly interfere with this assay.   However, presence of multiple variants may affect accuracy.     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         amLODIPine  10 mg Oral Daily    aspirin  81 mg Oral Daily    atorvastatin  40 mg Oral Daily    DULoxetine  60 mg Oral Daily    gabapentin  600 mg Oral TID    hydroCHLOROthiazide  12.5 mg Oral Daily    lisinopril  20 mg Oral Daily    nicotine  1 patch Transdermal Daily    senna-docusate 8.6-50 mg  1 tablet Oral BID    warfarin  12.5 mg Oral Daily     acetaminophen, albuterol-ipratropium, ALPRAZolam, dextrose 50%, dextrose 50%, glucagon (human recombinant), glucose, glucose, heparin (PORCINE), heparin (PORCINE), ondansetron, oxyCODONE, oxyCODONE, promethazine, ramelteon, sodium chloride 0.9%, sodium chloride 0.9%      Assessment and Plan for problems addressed today:   1. Vascular  complication with history of right below knee amputation  - ESR 43, CRP 8.7, .  - UA RLE arteries and veins unremarkable.  - CTA RLE 2/4 showed extensive atherosclerosis resulting in focal narrowing at the proximal aspect of the right popliteal artery likely representing 50% stenosis.  No vascular flow visualized beyond the proximal anterior tibial artery; uncertain if this represents normal postoperative vascular morphology given lack of prior imaging, good flow per vascular sx review  - Appreciate vascular surgery's assistance. No acute surgical intervention planned  - continue heparin drip until INR therapeutic, increased coumadin to 12.5mg yesterday  - PCP unable to manage coumadin so will need to establish here with new PCP   - consult pharm to assist with coumadin dosing, appreciate recs     2. Tobacco abuse  - Nicoderm patch  - Counseled on cessation      3. Anxiety and depression  - Continue Cymbalta 60mg daily and PRN Xanax 0.5mg TID PRN.     4. HLD (hyperlipidemia)  - Continue Lipitor 40mg daily.     5. Essential hypertension  - Continue amlodipine 10mg daily, HCTZ 12.5mg daily, lisinopril 20mg daily.           Diet: cardiac  DVT PPx: heparin   Code status: FULL     Discharge plan and follow up:  Continue heparin drip and coumadin, awaiting until therpeutic, INR at 1.6, vascular has no acute surgical plan as of now, leg is warm on exam today, will establish with [facility] PCP to get into coumadin clinic to manage coumadin        Sue Kelsey MD  IM PGY-3

## 2019-02-14 LAB
ALBUMIN SERPL BCP-MCNC: 4.1 G/DL
ALP SERPL-CCNC: 91 U/L
ALT SERPL W/O P-5'-P-CCNC: 51 U/L
ANION GAP SERPL CALC-SCNC: 9 MMOL/L
APTT BLDCRRT: 65.4 SEC
AST SERPL-CCNC: 30 U/L
BASOPHILS # BLD AUTO: 0.09 K/UL
BASOPHILS NFR BLD: 1 %
BILIRUB SERPL-MCNC: 0.4 MG/DL
BUN SERPL-MCNC: 16 MG/DL
CALCIUM SERPL-MCNC: 10.2 MG/DL
CHLORIDE SERPL-SCNC: 98 MMOL/L
CO2 SERPL-SCNC: 28 MMOL/L
CREAT SERPL-MCNC: 0.9 MG/DL
DIFFERENTIAL METHOD: NORMAL
EOSINOPHIL # BLD AUTO: 0.4 K/UL
EOSINOPHIL NFR BLD: 3.8 %
ERYTHROCYTE [DISTWIDTH] IN BLOOD BY AUTOMATED COUNT: 12.4 %
EST. GFR  (AFRICAN AMERICAN): >60 ML/MIN/1.73 M^2
EST. GFR  (NON AFRICAN AMERICAN): >60 ML/MIN/1.73 M^2
GLUCOSE SERPL-MCNC: 110 MG/DL
HCT VFR BLD AUTO: 50.3 %
HGB BLD-MCNC: 16.9 G/DL
IMM GRANULOCYTES # BLD AUTO: 0.02 K/UL
IMM GRANULOCYTES NFR BLD AUTO: 0.2 %
INR PPP: 1.8
LYMPHOCYTES # BLD AUTO: 4 K/UL
LYMPHOCYTES NFR BLD: 43.4 %
MCH RBC QN AUTO: 30.4 PG
MCHC RBC AUTO-ENTMCNC: 33.6 G/DL
MCV RBC AUTO: 91 FL
MONOCYTES # BLD AUTO: 0.8 K/UL
MONOCYTES NFR BLD: 8.6 %
NEUTROPHILS # BLD AUTO: 4 K/UL
NEUTROPHILS NFR BLD: 43 %
NRBC BLD-RTO: 0 /100 WBC
PLATELET # BLD AUTO: 261 K/UL
PMV BLD AUTO: 10.6 FL
POTASSIUM SERPL-SCNC: 4.5 MMOL/L
PROT SERPL-MCNC: 7.7 G/DL
PROTHROMBIN TIME: 17.9 SEC
RBC # BLD AUTO: 5.56 M/UL
SODIUM SERPL-SCNC: 135 MMOL/L
WBC # BLD AUTO: 9.2 K/UL

## 2019-02-14 PROCEDURE — 85610 PROTHROMBIN TIME: CPT

## 2019-02-14 PROCEDURE — 85730 THROMBOPLASTIN TIME PARTIAL: CPT

## 2019-02-14 PROCEDURE — 36415 COLL VENOUS BLD VENIPUNCTURE: CPT

## 2019-02-14 PROCEDURE — 25000003 PHARM REV CODE 250: Performed by: INTERNAL MEDICINE

## 2019-02-14 PROCEDURE — 80053 COMPREHEN METABOLIC PANEL: CPT

## 2019-02-14 PROCEDURE — 99232 SBSQ HOSP IP/OBS MODERATE 35: CPT | Mod: GC,,, | Performed by: HOSPITALIST

## 2019-02-14 PROCEDURE — 25000003 PHARM REV CODE 250: Performed by: PHYSICIAN ASSISTANT

## 2019-02-14 PROCEDURE — 99232 PR SUBSEQUENT HOSPITAL CARE,LEVL II: ICD-10-PCS | Mod: GC,,, | Performed by: HOSPITALIST

## 2019-02-14 PROCEDURE — 25000003 PHARM REV CODE 250: Performed by: STUDENT IN AN ORGANIZED HEALTH CARE EDUCATION/TRAINING PROGRAM

## 2019-02-14 PROCEDURE — S4991 NICOTINE PATCH NONLEGEND: HCPCS | Performed by: PHYSICIAN ASSISTANT

## 2019-02-14 PROCEDURE — 11000001 HC ACUTE MED/SURG PRIVATE ROOM

## 2019-02-14 PROCEDURE — 25000003 PHARM REV CODE 250: Performed by: HOSPITALIST

## 2019-02-14 PROCEDURE — 85025 COMPLETE CBC W/AUTO DIFF WBC: CPT

## 2019-02-14 RX ADMIN — OXYCODONE HYDROCHLORIDE 10 MG: 10 TABLET ORAL at 08:02

## 2019-02-14 RX ADMIN — OXYCODONE HYDROCHLORIDE 10 MG: 10 TABLET ORAL at 04:02

## 2019-02-14 RX ADMIN — STANDARDIZED SENNA CONCENTRATE AND DOCUSATE SODIUM 1 TABLET: 8.6; 5 TABLET, FILM COATED ORAL at 08:02

## 2019-02-14 RX ADMIN — ATORVASTATIN CALCIUM 40 MG: 10 TABLET, FILM COATED ORAL at 08:02

## 2019-02-14 RX ADMIN — WARFARIN SODIUM 15 MG: 5 TABLET ORAL at 04:02

## 2019-02-14 RX ADMIN — GABAPENTIN 600 MG: 100 CAPSULE ORAL at 08:02

## 2019-02-14 RX ADMIN — AMLODIPINE BESYLATE 10 MG: 10 TABLET ORAL at 08:02

## 2019-02-14 RX ADMIN — OXYCODONE HYDROCHLORIDE 10 MG: 10 TABLET ORAL at 12:02

## 2019-02-14 RX ADMIN — DULOXETINE 60 MG: 60 CAPSULE, DELAYED RELEASE ORAL at 08:02

## 2019-02-14 RX ADMIN — LISINOPRIL 20 MG: 20 TABLET ORAL at 08:02

## 2019-02-14 RX ADMIN — ASPIRIN 81 MG CHEWABLE TABLET 81 MG: 81 TABLET CHEWABLE at 08:02

## 2019-02-14 RX ADMIN — HYDROCHLOROTHIAZIDE 12.5 MG: 12.5 TABLET ORAL at 08:02

## 2019-02-14 RX ADMIN — NICOTINE 1 PATCH: 21 PATCH, EXTENDED RELEASE TRANSDERMAL at 08:02

## 2019-02-14 RX ADMIN — GABAPENTIN 600 MG: 100 CAPSULE ORAL at 02:02

## 2019-02-14 NOTE — PLAN OF CARE
Problem: Adult Inpatient Plan of Care  Goal: Plan of Care Review  Outcome: Ongoing (interventions implemented as appropriate)  AAOx4. Free of falls throughout shift. Heparin drip in therapeutic range. PRN Oxycodone 10mg given for 7 out 10 pain. Vitals stable. Afebrile. Will continue to monitor.

## 2019-02-14 NOTE — PLAN OF CARE
Problem: Adult Inpatient Plan of Care  Goal: Plan of Care Review  Outcome: Ongoing (interventions implemented as appropriate)  Patient AAOx4 and VSS throughout the night. Q2H rounding and white board updating maintained. Call bell within reach. Pain well controlled with PRN pain medication. Heparin drip continued -- aPTT @ 06:06 and in therapeutic range (59.4). AM lab draws only continued. R leg warm w/o discoloration. No falls or skin breakdown. WCTM.

## 2019-02-14 NOTE — PLAN OF CARE
Problem: Adult Inpatient Plan of Care  Goal: Plan of Care Review  Outcome: Ongoing (interventions implemented as appropriate)  Patient resting in bed comfortably.IV intact and Heparin infusing free of infection and irration,fall precautions maintained no falls noted. Call light in reach bed locked and in lowest position. Non skid socks on while out of bed. Patient instructed to call for assistance. Skin integrity maintained as patient is independent with frequent positioning, C/o pain managed with PRN meds, No other complaints or concerns. Progressing towards goals. Will continue to monitor and follow careplan of care.

## 2019-02-14 NOTE — PROGRESS NOTES
PHARMACY CONSULT NOTE: WARFARIN  Ren Grove is a 38 y.o. male on warfarin therapy for right leg stenosis. PharmD has been consulted for warfarin dosing.     Current order: 15 mg daily  Home dose: never on warfarin before  Coumadin clinic enrollment: Case mgmt set up pt with [facility] PCP so now patient has been referred to our CC  INR goal: 2-3               Lab Results   Component Value Date    INR 1.8 (H) 02/14/2019     INR 1.6 (H) 02/13/2019     INR 1.5 (H) 02/12/2019     INR 1.3 (H) 02/11/2019         Diet: Adult Cardiac     Recommendation(s):   · INR 1.8 today, after 1 dose of 15 mg. Would continue 15 mg daily.  · APTT therapeutic, 65.4 this AM. Goal 39-69.  ? Case mgmt set up pt with [facility] PCP so now patient has been referred to our CC.  · Pharmacy will continue to follow and monitor warfarin     Thank you for the consult,  Marques OlivaD  PGY-2 Internal Medicine Pharmacy Resident  EXT [phone]        **Note: Consults are reviewed Monday-Friday 7:00am-3:30pm. THE ABOVE RECOMMENDATIONS ARE ONLY SUGGESTED.THE RECOMMENDATIONS SHOULD BE CONSIDERED IN CONJUNCTION WITH ALL PATIENT FACTORS. **

## 2019-02-14 NOTE — PROGRESS NOTES
Hospital Medicine   Progress note   Team: [facility]OSP MED O Helen Kelsey MD   Admit Date: 2/4/2019   COLEEN 2/15/2019   Code status: Full Code   Principal Problem: Vascular complication     Interval hx: No events overnight.  Leg still warm this am.  Bridging coumadin, INR up to 1.8 today.  Coumadin dose increased yesterday. No CP or SOB.      ROS   Constitutional: Negative for chills, fatigue, fever.   HENT: Negative for sore throat, trouble swallowing.    Eyes: Negative for photophobia, visual disturbance.   Respiratory: Negative for cough, shortness of breath.    Cardiovascular: Negative for chest pain, palpitations, leg swelling.   Gastrointestinal: Negative for abdominal pain, constipation, diarrhea, nausea, vomiting.   Endocrine: Negative for cold intolerance, heat intolerance.   Genitourinary: Negative for dysuria, frequency.   Musculoskeletal: Negative arthralgias, myalgias  Skin: Negative for rash, erythema   Neurological: Negative for dizziness, syncope, weakness, light-headedness.   Psychiatric/Behavioral: Negative for confusion, hallucinations, anxiety  All other systems reviewed and are negative.    PEx   Temp:  [96.7 °F (35.9 °C)-98.1 °F (36.7 °C)]   Pulse:  []   Resp:  [12-16]   BP: (109-151)/(69-92)   SpO2:  [96 %-98 %]      I & O (Last 24H):     Intake/Output Summary (Last 24 hours) at 2/14/2019 0946  Last data filed at 2/13/2019 1800  Gross per 24 hour   Intake 540 ml   Output --   Net 540 ml       General appearance: no distress, alert and oriented x 3  HEENT:  conjunctivae/corneas clear, PERRL, mucous membranes moist   Neck: supple, thyroid not enlarged  Pulm:   normal respiratory effort, CTAB, no wheezes, rales or rhonchi  Card: RRR, S1, S2 normal, no murmur, click, rub or gallop  Abd: soft, NT, ND, BS present; no masses, no organomegaly  Ext: R BKA with warmth noted, mild skin irritation at the base around the site where prosthesis attaches, improving  Pulses: 2+, symmetric  Skin: color,  texture, turgor normal. No rashes or lesions  Neuro: CN II-XII grossly intact, no focal numbness or weakness, normal strength and tone   Psych: normal mood and affect      Recent Results (from the past 24 hour(s))   Comprehensive Metabolic Panel (CMP)    Collection Time: 02/14/19  5:43 AM   Result Value Ref Range    Sodium 135 (L) 136 - 145 mmol/L    Potassium 4.5 3.5 - 5.1 mmol/L    Chloride 98 95 - 110 mmol/L    CO2 28 23 - 29 mmol/L    Glucose 110 70 - 110 mg/dL    BUN, Bld 16 6 - 20 mg/dL    Creatinine 0.9 0.5 - 1.4 mg/dL    Calcium 10.2 8.7 - 10.5 mg/dL    Total Protein 7.7 6.0 - 8.4 g/dL    Albumin 4.1 3.5 - 5.2 g/dL    Total Bilirubin 0.4 0.1 - 1.0 mg/dL    Alkaline Phosphatase 91 55 - 135 U/L    AST 30 10 - 40 U/L    ALT 51 (H) 10 - 44 U/L    Anion Gap 9 8 - 16 mmol/L    eGFR if African American >60.0 >60 mL/min/1.73 m^2    eGFR if non African American >60.0 >60 mL/min/1.73 m^2   CBC with Automated Differential    Collection Time: 02/14/19  5:43 AM   Result Value Ref Range    WBC 9.20 3.90 - 12.70 K/uL    RBC 5.56 4.60 - 6.20 M/uL    Hemoglobin 16.9 14.0 - 18.0 g/dL    Hematocrit 50.3 40.0 - 54.0 %    MCV 91 82 - 98 fL    MCH 30.4 27.0 - 31.0 pg    MCHC 33.6 32.0 - 36.0 g/dL    RDW 12.4 11.5 - 14.5 %    Platelets 261 150 - 350 K/uL    MPV 10.6 9.2 - 12.9 fL    Immature Granulocytes 0.2 0.0 - 0.5 %    Gran # (ANC) 4.0 1.8 - 7.7 K/uL    Immature Grans (Abs) 0.02 0.00 - 0.04 K/uL    Lymph # 4.0 1.0 - 4.8 K/uL    Mono # 0.8 0.3 - 1.0 K/uL    Eos # 0.4 0.0 - 0.5 K/uL    Baso # 0.09 0.00 - 0.20 K/uL    nRBC 0 0 /100 WBC    Gran% 43.0 38.0 - 73.0 %    Lymph% 43.4 18.0 - 48.0 %    Mono% 8.6 4.0 - 15.0 %    Eosinophil% 3.8 0.0 - 8.0 %    Basophil% 1.0 0.0 - 1.9 %    Differential Method Automated    APTT    Collection Time: 02/14/19  5:43 AM   Result Value Ref Range    aPTT 65.4 (H) 21.0 - 32.0 sec   Protime-INR    Collection Time: 02/14/19  5:43 AM   Result Value Ref Range    Prothrombin Time 17.9 (H) 9.0 - 12.5 sec     INR 1.8 (H) 0.8 - 1.2       No results for input(s): POCTGLUCOSE in the last 168 hours.    Hemoglobin A1C   Date Value Ref Range Status   02/05/2019 5.1 4.0 - 5.6 % Final     Comment:     ADA Screening Guidelines:  5.7-6.4%  Consistent with prediabetes  >or=6.5%  Consistent with diabetes  High levels of fetal hemoglobin interfere with the HbA1C  assay. Heterozygous hemoglobin variants (HbS, HgC, etc)do  not significantly interfere with this assay.   However, presence of multiple variants may affect accuracy.     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         amLODIPine  10 mg Oral Daily    aspirin  81 mg Oral Daily    atorvastatin  40 mg Oral Daily    DULoxetine  60 mg Oral Daily    gabapentin  600 mg Oral TID    hydroCHLOROthiazide  12.5 mg Oral Daily    lisinopril  20 mg Oral Daily    nicotine  1 patch Transdermal Daily    senna-docusate 8.6-50 mg  1 tablet Oral BID    warfarin  15 mg Oral Daily     acetaminophen, albuterol-ipratropium, ALPRAZolam, dextrose 50%, dextrose 50%, glucagon (human recombinant), glucose, glucose, heparin (PORCINE), heparin (PORCINE), ondansetron, oxyCODONE, oxyCODONE, promethazine, ramelteon, sodium chloride 0.9%, sodium chloride 0.9%      Assessment and Plan for problems addressed today:   1. Vascular complication with history of right below knee amputation  - ESR 43, CRP 8.7, .  - UA RLE arteries and veins unremarkable.  - CTA RLE 2/4 showed extensive atherosclerosis resulting in focal narrowing at the proximal  aspect of the right popliteal artery likely representing 50% stenosis.  No vascular flow visualized beyond the proximal anterior tibial artery; uncertain if this represents normal postoperative vascular morphology given lack of prior imaging, good flow per vascular sx review  - Appreciate vascular surgery's assistance. No acute surgical intervention planned  - continue heparin drip until INR therapeutic, increased coumadin to 15 mg yesterday  - PCP unable to manage coumadin so will need to establish here with new PCP   - consulted pharm to assist with coumadin dosing, appreciate recs, will discuss coumadin dosing for today     2. Tobacco abuse  - Nicoderm patch  - Counseled on cessation      3. Anxiety and depression  - Continue Cymbalta 60mg daily and PRN Xanax 0.5mg TID PRN.     4. HLD (hyperlipidemia)  - Continue Lipitor 40mg daily.     5. Essential hypertension  - Continue amlodipine 10mg daily, HCTZ 12.5mg daily, lisinopril 20mg daily.           Diet: cardiac  DVT PPx: heparin   Code status: FULL     Discharge plan and follow up:  Continue heparin drip and coumadin, awaiting until therpeutic, INR at 1.8, vascular has no acute surgical plan as of now, leg is warm on exam today, will establish with [facility] PCP to get into coumadin clinic to manage coumadin        Sue Kelsey MD  IM PGY-3

## 2019-02-15 ENCOUNTER — ANTI-COAG VISIT (OUTPATIENT)
Dept: CARDIOLOGY | Facility: CLINIC | Age: 39
End: 2019-02-15

## 2019-02-15 VITALS
OXYGEN SATURATION: 98 % | WEIGHT: 203.63 LBS | TEMPERATURE: 98 F | RESPIRATION RATE: 18 BRPM | HEART RATE: 97 BPM | SYSTOLIC BLOOD PRESSURE: 144 MMHG | DIASTOLIC BLOOD PRESSURE: 98 MMHG | HEIGHT: 69 IN | BODY MASS INDEX: 30.16 KG/M2

## 2019-02-15 DIAGNOSIS — I99.9 VASCULAR COMPLICATION: ICD-10-CM

## 2019-02-15 DIAGNOSIS — T87.9 BKA STUMP COMPLICATION: ICD-10-CM

## 2019-02-15 DIAGNOSIS — Z79.01 LONG TERM (CURRENT) USE OF ANTICOAGULANTS: Primary | ICD-10-CM

## 2019-02-15 DIAGNOSIS — Z79.01 WARFARIN ANTICOAGULATION: ICD-10-CM

## 2019-02-15 LAB
ALBUMIN SERPL BCP-MCNC: 4.2 G/DL
ALP SERPL-CCNC: 94 U/L
ALT SERPL W/O P-5'-P-CCNC: 44 U/L
ANION GAP SERPL CALC-SCNC: 10 MMOL/L
APTT BLDCRRT: 54.9 SEC
AST SERPL-CCNC: 32 U/L
BASOPHILS # BLD AUTO: 0.09 K/UL
BASOPHILS NFR BLD: 0.9 %
BILIRUB SERPL-MCNC: 0.4 MG/DL
BUN SERPL-MCNC: 15 MG/DL
CALCIUM SERPL-MCNC: 10 MG/DL
CHLORIDE SERPL-SCNC: 95 MMOL/L
CO2 SERPL-SCNC: 25 MMOL/L
CREAT SERPL-MCNC: 0.8 MG/DL
DIFFERENTIAL METHOD: NORMAL
EOSINOPHIL # BLD AUTO: 0.2 K/UL
EOSINOPHIL NFR BLD: 2.5 %
ERYTHROCYTE [DISTWIDTH] IN BLOOD BY AUTOMATED COUNT: 12.3 %
EST. GFR  (AFRICAN AMERICAN): >60 ML/MIN/1.73 M^2
EST. GFR  (NON AFRICAN AMERICAN): >60 ML/MIN/1.73 M^2
GLUCOSE SERPL-MCNC: 106 MG/DL
HCT VFR BLD AUTO: 50.5 %
HGB BLD-MCNC: 16.3 G/DL
IMM GRANULOCYTES # BLD AUTO: 0.02 K/UL
IMM GRANULOCYTES NFR BLD AUTO: 0.2 %
INR PPP: 2.3
LYMPHOCYTES # BLD AUTO: 4.2 K/UL
LYMPHOCYTES NFR BLD: 43.2 %
MCH RBC QN AUTO: 30 PG
MCHC RBC AUTO-ENTMCNC: 32.3 G/DL
MCV RBC AUTO: 93 FL
MONOCYTES # BLD AUTO: 0.9 K/UL
MONOCYTES NFR BLD: 8.9 %
NEUTROPHILS # BLD AUTO: 4.3 K/UL
NEUTROPHILS NFR BLD: 44.3 %
NRBC BLD-RTO: 0 /100 WBC
OSMOLALITY SERPL: 280 MOSM/KG
PLATELET # BLD AUTO: 245 K/UL
PMV BLD AUTO: 12.1 FL
POTASSIUM SERPL-SCNC: 4.3 MMOL/L
PROT SERPL-MCNC: 7.7 G/DL
PROTHROMBIN TIME: 22.1 SEC
RBC # BLD AUTO: 5.44 M/UL
SODIUM SERPL-SCNC: 130 MMOL/L
WBC # BLD AUTO: 9.7 K/UL

## 2019-02-15 PROCEDURE — 25000003 PHARM REV CODE 250: Performed by: INTERNAL MEDICINE

## 2019-02-15 PROCEDURE — 99238 HOSP IP/OBS DSCHRG MGMT 30/<: CPT | Mod: GC,,, | Performed by: HOSPITALIST

## 2019-02-15 PROCEDURE — 25000003 PHARM REV CODE 250: Performed by: PHYSICIAN ASSISTANT

## 2019-02-15 PROCEDURE — 83930 ASSAY OF BLOOD OSMOLALITY: CPT

## 2019-02-15 PROCEDURE — S4991 NICOTINE PATCH NONLEGEND: HCPCS | Performed by: PHYSICIAN ASSISTANT

## 2019-02-15 PROCEDURE — 80053 COMPREHEN METABOLIC PANEL: CPT

## 2019-02-15 PROCEDURE — 25000003 PHARM REV CODE 250: Performed by: HOSPITALIST

## 2019-02-15 PROCEDURE — 63600175 PHARM REV CODE 636 W HCPCS: Performed by: PHYSICIAN ASSISTANT

## 2019-02-15 PROCEDURE — 99238 PR HOSPITAL DISCHARGE DAY,<30 MIN: ICD-10-PCS | Mod: GC,,, | Performed by: HOSPITALIST

## 2019-02-15 PROCEDURE — 85610 PROTHROMBIN TIME: CPT

## 2019-02-15 PROCEDURE — 85730 THROMBOPLASTIN TIME PARTIAL: CPT

## 2019-02-15 PROCEDURE — 36415 COLL VENOUS BLD VENIPUNCTURE: CPT

## 2019-02-15 PROCEDURE — 85025 COMPLETE CBC W/AUTO DIFF WBC: CPT

## 2019-02-15 RX ORDER — NAPROXEN SODIUM 220 MG/1
81 TABLET, FILM COATED ORAL DAILY
Refills: 0 | Status: ON HOLD | COMMUNITY
Start: 2019-02-16 | End: 2020-02-27 | Stop reason: HOSPADM

## 2019-02-15 RX ORDER — WARFARIN SODIUM 5 MG/1
12.5 TABLET ORAL DAILY
Qty: 90 TABLET | Refills: 3 | Status: ON HOLD | OUTPATIENT
Start: 2019-02-15 | End: 2020-02-27 | Stop reason: HOSPADM

## 2019-02-15 RX ORDER — WARFARIN 2.5 MG/1
12.5 TABLET ORAL DAILY
Qty: 90 TABLET | Refills: 3 | Status: SHIPPED | OUTPATIENT
Start: 2019-02-15 | End: 2019-02-15 | Stop reason: SDUPTHER

## 2019-02-15 RX ADMIN — OXYCODONE HYDROCHLORIDE 10 MG: 10 TABLET ORAL at 12:02

## 2019-02-15 RX ADMIN — ATORVASTATIN CALCIUM 40 MG: 10 TABLET, FILM COATED ORAL at 08:02

## 2019-02-15 RX ADMIN — STANDARDIZED SENNA CONCENTRATE AND DOCUSATE SODIUM 1 TABLET: 8.6; 5 TABLET, FILM COATED ORAL at 08:02

## 2019-02-15 RX ADMIN — OXYCODONE HYDROCHLORIDE 10 MG: 10 TABLET ORAL at 04:02

## 2019-02-15 RX ADMIN — AMLODIPINE BESYLATE 10 MG: 10 TABLET ORAL at 08:02

## 2019-02-15 RX ADMIN — GABAPENTIN 600 MG: 100 CAPSULE ORAL at 08:02

## 2019-02-15 RX ADMIN — ASPIRIN 81 MG CHEWABLE TABLET 81 MG: 81 TABLET CHEWABLE at 08:02

## 2019-02-15 RX ADMIN — DULOXETINE 60 MG: 60 CAPSULE, DELAYED RELEASE ORAL at 08:02

## 2019-02-15 RX ADMIN — OXYCODONE HYDROCHLORIDE 10 MG: 10 TABLET ORAL at 08:02

## 2019-02-15 RX ADMIN — HYDROCHLOROTHIAZIDE 12.5 MG: 12.5 TABLET ORAL at 08:02

## 2019-02-15 RX ADMIN — LISINOPRIL 20 MG: 20 TABLET ORAL at 08:02

## 2019-02-15 RX ADMIN — NICOTINE 1 PATCH: 21 PATCH, EXTENDED RELEASE TRANSDERMAL at 08:02

## 2019-02-15 RX ADMIN — HEPARIN SODIUM 11.99 UNITS/KG/HR: 10000 INJECTION, SOLUTION INTRAVENOUS at 12:02

## 2019-02-15 NOTE — PROGRESS NOTES
Mr. Grove is a 37 y/o male recently admitted to [facility] 2/2 pain, coldness, erythema to R BKA. His PMH is significant for DVT 2012 (subsequent BKA), h/o compartment syndrome, HTN, HLD, anxiety, current smoker.    Calendar updated with inpatient warfarin doses and INRs.

## 2019-02-15 NOTE — PLAN OF CARE
Future Appointments   Date Time Provider Department Center   2/19/2019  9:30 AM Carola George NP [facility]     Patient registered with [facility] and pharmacist will be calling patient to notify of scheduled follow up.

## 2019-02-15 NOTE — PROGRESS NOTES
I left message for patient to call back to complete enrollment and he needs to be updated he has 5 mg tabs (to take 2.5 tabs daily = 12.5 mg) to be picked up at his pharmacy.    UPDATE:  2/26/19   I left voice message for patient to call back to complete enrollment and to verify if he is taking Coumadin at all.    2/25/19  I left voice message for patient to call back to complete enrollment and to verify if he is taking Coumadin at all.    2/22/19  I left voice message for patient to call back to complete enrollment and to verify if he is taking Coumadin at all.    2/21/19  I left voice message for patient to complete enrollment and to inquire if he is still taking Coumadin.    2/20/19  I left message for patient to call to complete enrollment.    2/19/19  I left message for patient to call back to complete enrollment and sent a message to Dr Caldwell to notify patient will not respond and is not being monitored on Coumadin. Dr Caldwell sent a message back that the patient cancelled his appointment with her NP and that she has not seen the patient before.    2/18/19  I left message for patient to call back to complete enrollment.

## 2019-02-15 NOTE — PROGRESS NOTES
PHARMACY CONSULT NOTE: WARFARIN  Ren Grove is a 38 y.o. male on warfarin therapy for right leg stenosis. PharmD has been consulted for warfarin dosing.     Current order: 15 mg daily  Home dose: never on warfarin before  Coumadin clinic enrollment: Case mgmt set up pt with [facility] PCP so now patient has been referred to our CC  INR goal: 2-3               Lab Results   Component Value Date    INR 2.3 (H) 02/15/2019     INR 1.8 (H) 02/14/2019     INR 1.6 (H) 02/13/2019         Diet: Adult Cardiac     Recommendation(s):   · INR 2.3 today, after 2 doses of 15 mg. Would recommend decreasing to 12.5 mg daily  · Case mgmt set up pt with [facility] so now patient has been referred to our CC.  · Pharmacy will continue to follow and monitor warfarin     Thank you for the consult,  Jailene Dhillon, PharmD  PGY-2 Internal Medicine Pharmacy Resident  EXT [id number]        **Note: Consults are reviewed Monday-Friday 7:00am-3:30pm. THE ABOVE RECOMMENDATIONS ARE ONLY SUGGESTED.THE RECOMMENDATIONS SHOULD BE CONSIDERED IN CONJUNCTION WITH ALL PATIENT FACTORS. **

## 2019-02-15 NOTE — DISCHARGE SUMMARY
[facility]  Discharge Summary      Admit Date: 2/4/2019    Discharge Date and Time:  02/15/2019 1:00 PM    Attending Physician: Rohini Xie MD     Reason for Admission: R LE stump skin changes, R popliteal artery stenosis    Procedures Performed: * No surgery found *    Hospital Course: Patient is a 38 year old gentleman with a h/o R BKA following DVT in 2012,Left LE compartment syndrome, HTN, and anxiety.  He presents with pain, coolness, and erythema to his right stump. Pain began Sunday night and continued to worsen the following day.  Color change is consistent with area of contact with his prostetic liner.  It has not spread past demarcation.  Pain is worse with palpation and movement.  Denies trauma and is otherwise in his usual state of health.  Denies chest pain, SOB, dizziness, palpitaitons, fever/chills, N/V/D.     US of arteries and veins of RLE was unremarkable.  Patient was seen by Vascular surgery, who recommended CTA of lower extremity.  This showed extensive atherosclerosis resulting in focal narrowing at the proximal aspect of the right popliteal artery likely representing 50% stenosis.  No vascular flow visualized beyond the proximal anterior tibial artery; uncertain if this represents normal postoperative vascular morphology given lack of prior imaging. Patient was started on anti-coagulation with heparin ggt, bridging to warfarin. Vascular surgery felt there was infact good flow on the CTA and that Pt's R BKA pain and skin changes were primary due to his ill-fitting prosthesis and less likely a ischemic/vascular issue. Thus, heparin ggt was initially discontinued; however patient's R LE stump again became cool to touch with heparin ggt off. Heparin ggt was thus restarted and patient was bridged to warfarin with INR targeted 2-3. Today patient's INR is 2.2 and he is ready for discharge with PCP and coumadin clinic FU. Coumadin clinic follow up and lab draw requested for  "Monday. Patient noted to be mildly hyponatremic with Na of 130 today, possibly 2/2 d5 mixed in with heparin ggt. FU BMP ordered with out pt Monday labs. Consider further SUMNER and stopping HCTZ if hyponatremia persists.     Consults: vascular surgery, pharmacy     VS  BP (!) 144/98   Pulse 97   Temp 97.5 °F (36.4 °C)   Resp 18   Ht 5' 9" (1.753 m)   Wt 92.4 kg (203 lb 9.6 oz)   SpO2 98%   BMI 30.07 kg/m²     Physical exam  General appearance: no distress, alert and oriented x 3  HEENT:  conjunctivae/corneas clear, PERRL, mucous membranes moist   Neck: supple, thyroid not enlarged  Pulm:   normal respiratory effort, CTAB, no wheezes, rales or rhonchi  Card: RRR, S1, S2 normal, no murmur, click, rub or gallop  Abd: soft, NT, ND, BS present; no masses, no organomegaly  Ext: R BKA with warmth noted, mild skin irritation at the base around the site where prosthesis attaches, improving  Pulses: 2+, symmetric  Skin: color, texture, turgor normal. No rashes or lesions  Neuro: CN II-XII grossly intact, no focal numbness or weakness, normal strength and tone   Psych: normal mood and affect    Significant Diagnostic Studies: Labs:   CMP   Recent Labs   Lab 02/14/19  0543 02/15/19  0437   * 130*   K 4.5 4.3   CL 98 95   CO2 28 25    106   BUN 16 15   CREATININE 0.9 0.8   CALCIUM 10.2 10.0   PROT 7.7 7.7   ALBUMIN 4.1 4.2   BILITOT 0.4 0.4   ALKPHOS 91 94   AST 30 32   ALT 51* 44   ANIONGAP 9 10   ESTGFRAFRICA >60.0 >60.0   EGFRNONAA >60.0 >60.0   , CBC   Recent Labs   Lab 02/14/19  0543 02/15/19  0437   WBC 9.20 9.70   HGB 16.9 16.3   HCT 50.3 50.5    245    and INR   Lab Results   Component Value Date    INR 2.3 (H) 02/15/2019    INR 1.8 (H) 02/14/2019    INR 1.6 (H) 02/13/2019     CTA LE 2/4/19  Postoperative change of right below-knee amputation.  Extensive atherosclerosis resulting in focal narrowing at the proximal aspect of the right popliteal artery likely representing 50% stenosis.  No vascular " flow is visualized beyond the proximal anterior tibial artery.  It is uncertain if this represents normal postoperative vascular morphology given lack of prior imaging.  Recommend clinical correlation.    Disuse osteopenia at the distal right femur and knee.    Diverticulosis coli without evidence of diverticulitis.    Assessment and Plan for problems addressed today:   1. Vascular complication with history of right below knee amputation  - ESR 43, CRP 8.7, .  - UA RLE arteries and veins unremarkable.  - CTA RLE 2/4 showed extensive atherosclerosis resulting in focal narrowing at the proximal aspect of the right popliteal artery likely representing 50% stenosis.  No vascular flow visualized beyond the proximal anterior tibial artery; uncertain if this represents normal postoperative vascular morphology given lack of prior imaging, good flow per vascular sx review  - Appreciate vascular surgery's assistance. No acute surgical intervention planned  - INR therapeutic today at 2.2  - PCP and coumadin clinic FU next week    2. Tobacco abuse  - Nicoderm patch  - Counseled on cessation      3. Anxiety and depression  - Continue Cymbalta 60mg daily and PRN Xanax 0.5mg TID PRN.     4. HLD (hyperlipidemia)  - Continue Lipitor 40mg daily.     5. Essential hypertension  - Continue amlodipine 10mg daily, HCTZ 12.5mg daily, lisinopril 20mg daily.    5. Hyponatremia  -Pt hyponatremic to 130 (yesterday 135)  -out pt BMP to be drawn with coumadin clinic labs, if persists consider further SUMNER and discontinuing HCTZ    Final Diagnoses:    Principal Problem: Vascular complication   Secondary Diagnoses:   Active Hospital Problems    Diagnosis  POA    *Vascular complication [I99.9]  Yes    BKA stump complication [T87.9]  Yes    Ecchymosis [R58]  Yes    Tobacco abuse [Z72.0]  Yes     Chronic    Pain in right lower leg [M79.661]  Yes    HLD (hyperlipidemia) [E78.5]  Yes     Chronic    Essential hypertension [I10]  Yes      Chronic    Anxiety and depression [F41.9, F32.9]  Yes     Chronic      Resolved Hospital Problems   No resolved problems to display.       Discharged Condition: good    Disposition: Home or Self Care    Follow Up/Patient Instructions:     Medications:  Reconciled Home Medications:      Medication List      START taking these medications    aspirin 81 MG Chew  Take 1 tablet (81 mg total) by mouth once daily.  Start taking on:  2/16/2019     warfarin 2.5 MG tablet  Commonly known as:  COUMADIN  Take 5 tablets (12.5 mg total) by mouth Daily.        CONTINUE taking these medications    ALPRAZolam 0.5 MG tablet  Commonly known as:  XANAX  Take 0.5 mg by mouth 3 (three) times daily as needed for Anxiety.     amLODIPine 10 MG tablet  Commonly known as:  NORVASC  Take 1 tablet (10 mg total) by mouth once daily.     atorvastatin 40 MG tablet  Commonly known as:  LIPITOR  Take 40 mg by mouth once daily.     DULoxetine 60 MG capsule  Commonly known as:  CYMBALTA  Take 60 mg by mouth once daily.     gabapentin 300 MG capsule  Commonly known as:  NEURONTIN  Take 2 capsules (600 mg total) by mouth 3 (three) times daily. Take one tablet by mouth 3 to 4 times daily     HYDROcodone-acetaminophen 5-325 mg per tablet  Commonly known as:  NORCO  Take 1 tablet by mouth every 8 (eight) hours as needed.     INV LISINOPRIL-HCTZ 20-12.5  Take 1 tablet by mouth once daily. For investigational use only.        ASK your doctor about these medications    diclofenac 50 MG EC tablet  Commonly known as:  VOLTAREN  Take 1 tablet (50 mg total) by mouth 2 (two) times daily.     ibuprofen 200 MG tablet  Commonly known as:  ADVIL,MOTRIN  Take 2 tablets (400 mg total) by mouth every 6 (six) hours as needed for Pain.     naproxen 500 MG tablet  Commonly known as:  NAPROSYN  Take 500 mg by mouth 2 (two) times daily.     traMADol 50 mg tablet  Commonly known as:  ULTRAM  Take 1 tablet (50 mg total) by mouth every 6 (six) hours as needed for Pain.           Discharge Procedure Orders   BASIC METABOLIC PANEL   Standing Status: Future Standing Exp. Date: 04/15/20   Scheduling Instructions: Hyponatremia check     PROTIME-INR   Standing Status: Future Standing Exp. Date: 04/15/20     Ambulatory consult to Anticoagulation Monitoring   Referral Priority: Routine Referral Type: Consultation   Referral Reason: Specialty Services Required   Requested Specialty: Cardiology   Number of Visits Requested: 1     Ambulatory referral to Anticoagulation Monitoring   Referral Priority: Routine Referral Type: Consultation   Referral Reason: Specialty Services Required   Requested Specialty: Cardiology   Number of Visits Requested: 1     Follow-up Information     Carola George NP On 2/19/2019.    Specialty:  Family Medicine  Why:  9:30am  Primary Care establishment/ hospital discharge   Contact information:  [address]  [facility] [address]  [phone]             [facility] - Coumadin.    Specialty:  Cardiology  Why:  New enrollment   Contact information:  [address]  [facility] [id number] [phone]  Additional information:  3rd floor           [facility] - Vascular Surgery In 2 weeks.    Specialty:  Vascular Surgery  Contact information:  [address]  [facility] [id number] [phone]  Additional information:  5th Floor [facility] [place]

## 2019-02-15 NOTE — NURSING
Discharged to home. Condition stable. IV removed. No redness or swelling noted to site. Verbalizes understanding of discharge instructions.

## 2019-02-26 ENCOUNTER — ANTI-COAG VISIT (OUTPATIENT)
Dept: CARDIOLOGY | Facility: CLINIC | Age: 39
End: 2019-02-26

## 2019-02-26 DIAGNOSIS — Z79.01 WARFARIN ANTICOAGULATION: ICD-10-CM

## 2019-02-26 DIAGNOSIS — I99.9 VASCULAR COMPLICATION: ICD-10-CM

## 2019-02-26 DIAGNOSIS — Z79.01 LONG TERM (CURRENT) USE OF ANTICOAGULANTS: ICD-10-CM

## 2019-02-26 DIAGNOSIS — T87.9 BKA STUMP COMPLICATION: ICD-10-CM

## 2019-02-26 NOTE — PROGRESS NOTES
Noted several problems with enrollment. 1) patient only has Medicare part A insurance coverage  2) he has not established with [facility] provider to sign off on orders as an outpatient and for future follow up. 3) We have been unable to reach him.     I have called all available numbers and emergency contacts. We are unable to enroll him at this time due to afore mentioned problems. D/c referral. Message sent to  from hospital in case she has any other options for patient. Only other thing is to have his PCP assist who is outside of network.

## 2019-02-26 NOTE — PROGRESS NOTES
Noted several problems with enrollment. 1) patient only has Medicare part A insurance coverage  2) he has not established with [facility] provider to sign off on orders as an outpatient and for future follow up. 3) We have been unable to reach him.     I have called all available numbers and emergency contacts. We are unable to enroll him at this time due to afore mentioned problems. D/c referral. Message sent to  from hospital in case she has any other options for patient. Only other thing is to have his PCP assist who is outside of network. Since Dr. Caldwell has not seen patient will send sign off to hospital MD from referral.

## 2019-07-08 ENCOUNTER — HOSPITAL ENCOUNTER (EMERGENCY)
Facility: HOSPITAL | Age: 39
Discharge: HOME | End: 2019-07-09
Attending: EMERGENCY MEDICINE
Payer: MEDICARE

## 2019-07-08 DIAGNOSIS — M54.42 LEFT-SIDED LOW BACK PAIN WITH LEFT-SIDED SCIATICA, UNSPECIFIED CHRONICITY: Primary | ICD-10-CM

## 2019-07-08 PROCEDURE — 25000003 PHARM REV CODE 250: Performed by: PHYSICIAN ASSISTANT

## 2019-07-08 PROCEDURE — 99284 EMERGENCY DEPT VISIT MOD MDM: CPT | Mod: ,,, | Performed by: EMERGENCY MEDICINE

## 2019-07-08 PROCEDURE — 96375 TX/PRO/DX INJ NEW DRUG ADDON: CPT

## 2019-07-08 PROCEDURE — 99284 PR EMERGENCY DEPT VISIT,LEVEL IV: ICD-10-PCS | Mod: ,,, | Performed by: EMERGENCY MEDICINE

## 2019-07-08 PROCEDURE — S0028 INJECTION, FAMOTIDINE, 20 MG: HCPCS | Performed by: PHYSICIAN ASSISTANT

## 2019-07-08 PROCEDURE — 99285 EMERGENCY DEPT VISIT HI MDM: CPT | Mod: 25

## 2019-07-08 PROCEDURE — 96374 THER/PROPH/DIAG INJ IV PUSH: CPT

## 2019-07-08 PROCEDURE — 63600175 PHARM REV CODE 636 W HCPCS: Performed by: PHYSICIAN ASSISTANT

## 2019-07-08 RX ORDER — ONDANSETRON 2 MG/ML
4 INJECTION INTRAMUSCULAR; INTRAVENOUS
Status: COMPLETED | OUTPATIENT
Start: 2019-07-08 | End: 2019-07-08

## 2019-07-08 RX ORDER — DIPHENHYDRAMINE HCL 25 MG
25 CAPSULE ORAL
Status: COMPLETED | OUTPATIENT
Start: 2019-07-08 | End: 2019-07-08

## 2019-07-08 RX ORDER — FAMOTIDINE 10 MG/ML
20 INJECTION INTRAVENOUS
Status: COMPLETED | OUTPATIENT
Start: 2019-07-08 | End: 2019-07-08

## 2019-07-08 RX ORDER — MORPHINE SULFATE 4 MG/ML
4 INJECTION, SOLUTION INTRAMUSCULAR; INTRAVENOUS
Status: COMPLETED | OUTPATIENT
Start: 2019-07-08 | End: 2019-07-08

## 2019-07-08 RX ADMIN — ONDANSETRON 4 MG: 2 INJECTION INTRAMUSCULAR; INTRAVENOUS at 11:07

## 2019-07-08 RX ADMIN — FAMOTIDINE 20 MG: 10 INJECTION INTRAVENOUS at 11:07

## 2019-07-08 RX ADMIN — DIPHENHYDRAMINE HYDROCHLORIDE 25 MG: 25 CAPSULE ORAL at 11:07

## 2019-07-08 RX ADMIN — MORPHINE SULFATE 4 MG: 4 INJECTION, SOLUTION INTRAMUSCULAR; INTRAVENOUS at 11:07

## 2019-07-09 VITALS
SYSTOLIC BLOOD PRESSURE: 190 MMHG | TEMPERATURE: 98 F | HEART RATE: 94 BPM | HEIGHT: 69 IN | DIASTOLIC BLOOD PRESSURE: 91 MMHG | RESPIRATION RATE: 16 BRPM | WEIGHT: 203.69 LBS | BODY MASS INDEX: 30.17 KG/M2 | OXYGEN SATURATION: 98 %

## 2019-07-09 LAB
ALBUMIN SERPL BCP-MCNC: 4.2 G/DL (ref 3.5–5.2)
ALP SERPL-CCNC: 85 U/L (ref 55–135)
ALT SERPL W/O P-5'-P-CCNC: 9 U/L (ref 10–44)
ANION GAP SERPL CALC-SCNC: 13 MMOL/L (ref 8–16)
AST SERPL-CCNC: 20 U/L (ref 10–40)
BACTERIA #/AREA URNS AUTO: ABNORMAL /HPF
BASOPHILS # BLD AUTO: 0.06 K/UL (ref 0–0.2)
BASOPHILS NFR BLD: 0.6 % (ref 0–1.9)
BILIRUB SERPL-MCNC: 0.4 MG/DL (ref 0.1–1)
BILIRUB UR QL STRIP: NEGATIVE
BUN SERPL-MCNC: 13 MG/DL (ref 6–20)
CALCIUM SERPL-MCNC: 9.5 MG/DL (ref 8.7–10.5)
CHLORIDE SERPL-SCNC: 105 MMOL/L (ref 95–110)
CLARITY UR REFRACT.AUTO: CLEAR
CO2 SERPL-SCNC: 21 MMOL/L (ref 23–29)
COLOR UR AUTO: YELLOW
CREAT SERPL-MCNC: 0.8 MG/DL (ref 0.5–1.4)
DIFFERENTIAL METHOD: ABNORMAL
EOSINOPHIL # BLD AUTO: 0.1 K/UL (ref 0–0.5)
EOSINOPHIL NFR BLD: 0.8 % (ref 0–8)
ERYTHROCYTE [DISTWIDTH] IN BLOOD BY AUTOMATED COUNT: 13 % (ref 11.5–14.5)
EST. GFR  (AFRICAN AMERICAN): >60 ML/MIN/1.73 M^2
EST. GFR  (NON AFRICAN AMERICAN): >60 ML/MIN/1.73 M^2
GLUCOSE SERPL-MCNC: 93 MG/DL (ref 70–110)
GLUCOSE UR QL STRIP: NEGATIVE
HCT VFR BLD AUTO: 44.4 % (ref 40–54)
HGB BLD-MCNC: 14.9 G/DL (ref 14–18)
HGB UR QL STRIP: NEGATIVE
HYALINE CASTS UR QL AUTO: 4 /LPF
IMM GRANULOCYTES # BLD AUTO: 0.06 K/UL (ref 0–0.04)
IMM GRANULOCYTES NFR BLD AUTO: 0.6 % (ref 0–0.5)
INR PPP: 1 (ref 0.8–1.2)
KETONES UR QL STRIP: ABNORMAL
LEUKOCYTE ESTERASE UR QL STRIP: NEGATIVE
LYMPHOCYTES # BLD AUTO: 4.2 K/UL (ref 1–4.8)
LYMPHOCYTES NFR BLD: 38.4 % (ref 18–48)
MAGNESIUM SERPL-MCNC: 2.1 MG/DL (ref 1.6–2.6)
MCH RBC QN AUTO: 29.8 PG (ref 27–31)
MCHC RBC AUTO-ENTMCNC: 33.6 G/DL (ref 32–36)
MCV RBC AUTO: 89 FL (ref 82–98)
MICROSCOPIC COMMENT: ABNORMAL
MONOCYTES # BLD AUTO: 1.2 K/UL (ref 0.3–1)
MONOCYTES NFR BLD: 10.6 % (ref 4–15)
NEUTROPHILS # BLD AUTO: 5.3 K/UL (ref 1.8–7.7)
NEUTROPHILS NFR BLD: 49 % (ref 38–73)
NITRITE UR QL STRIP: NEGATIVE
NRBC BLD-RTO: 0 /100 WBC
PH UR STRIP: 5 [PH] (ref 5–8)
PLATELET # BLD AUTO: 239 K/UL (ref 150–350)
PLATELET BLD QL SMEAR: ABNORMAL
PMV BLD AUTO: 10.4 FL (ref 9.2–12.9)
POLYCHROMASIA BLD QL SMEAR: ABNORMAL
POTASSIUM SERPL-SCNC: 3.6 MMOL/L (ref 3.5–5.1)
PROT SERPL-MCNC: 7.5 G/DL (ref 6–8.4)
PROT UR QL STRIP: ABNORMAL
PROTHROMBIN TIME: 10.5 SEC (ref 9–12.5)
RBC # BLD AUTO: 5 M/UL (ref 4.6–6.2)
RBC #/AREA URNS AUTO: 3 /HPF (ref 0–4)
SODIUM SERPL-SCNC: 139 MMOL/L (ref 136–145)
SP GR UR STRIP: 1.02 (ref 1–1.03)
URN SPEC COLLECT METH UR: ABNORMAL
WBC # BLD AUTO: 10.83 K/UL (ref 3.9–12.7)
WBC #/AREA URNS AUTO: 1 /HPF (ref 0–5)

## 2019-07-09 PROCEDURE — 81001 URINALYSIS AUTO W/SCOPE: CPT

## 2019-07-09 PROCEDURE — 85025 COMPLETE CBC W/AUTO DIFF WBC: CPT

## 2019-07-09 PROCEDURE — 80053 COMPREHEN METABOLIC PANEL: CPT

## 2019-07-09 PROCEDURE — 83735 ASSAY OF MAGNESIUM: CPT

## 2019-07-09 PROCEDURE — 85610 PROTHROMBIN TIME: CPT

## 2019-07-09 RX ORDER — LIDOCAINE 50 MG/G
1 PATCH TOPICAL
Qty: 5 PATCH | Refills: 0 | Status: ON HOLD | OUTPATIENT
Start: 2019-07-09 | End: 2020-02-21 | Stop reason: CLARIF

## 2019-07-09 RX ORDER — NAPROXEN 500 MG/1
500 TABLET ORAL 2 TIMES DAILY WITH MEALS
Qty: 14 TABLET | Refills: 0 | Status: SHIPPED | OUTPATIENT
Start: 2019-07-09 | End: 2019-07-16

## 2019-07-09 RX ORDER — KETOROLAC TROMETHAMINE 10 MG/1
10 TABLET, FILM COATED ORAL
Status: DISCONTINUED | OUTPATIENT
Start: 2019-07-09 | End: 2019-07-09 | Stop reason: HOSPADM

## 2019-07-09 NOTE — ED TRIAGE NOTES
"Pt reports helping his wife lift a couch on yesterday. Today, while cooking, pt twisted his back, felt a "pop" and began having excruciating pain radiating to his right stump  "

## 2019-07-09 NOTE — ED PROVIDER NOTES
"Encounter Date: 7/8/2019       History     Chief Complaint   Patient presents with    Back Pain     Patient reports that he was standing, turned and felt a "pop" in his left lower back 5 days ago. Patient states that he has been having lower back pain since. Tender to palpation.      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 He presents to the ED via EMS for back pain. He reports having low back pain onset 5 days ago. Reports leaning over kitchen cabinet and having back spasm and lowering self to ground due to pain. Describes pain as constant and stabbing. Rates pain 10/10. Denies OTC medication use. Hx of similar pain in past. Reports having b/l lower extremity numbness since today. Denies urinary/fecal incontinence or lower extremity weakness. Reports taking antihypertensive medications today. Patient denies new lotions, foods, medications, or detergents. Denies trouble swallowing. He denies fever,chills, nausea, vomiting, sob, chest pain, abd pain, dysuria, diarrhea, or constipation. He is a current everyday smoker and reports alcohol use. Denies hx of IVDU.    The history is provided by the patient, the spouse and medical records. No  was used.     Review of patient's allergies indicates:  No Known Allergies  Past Medical History:   Diagnosis Date    Amputation of lower limb     left below the knee    Compartment syndrome of left lower extremity     Compartment syndrome of right lower extremity     Depression 3/18/2016    DVT (deep venous thrombosis)     Encounter for cholesteral screening for cardiovascular disease     Hypertension     Tachycardia      Past Surgical History:   Procedure Laterality Date     Fasciotomy closure Left 12/11/2015    Performed by [facility] at [facility] OR 2ND FLR    AMPUTATION, LOWER LIMB  2012    right BKA    FASCIOTOMY-COMPARTMENT Left 12/9/2015    Performed by [facility] at [facility] OR " 2ND FLR    INCISION-DRAINAGE-HEMATOMA Left 12/30/2015    Performed by Kaylan [facility] MD at [facility] OR 2ND FLR    KNEE SURGERY      TIBIAL FASCIOTOMY Right 12/08/2015    VENOGRAM Left 3/3/2016    Performed by Filemon Goddard MD at [facility] CATH LAB     No family history on file.  Social History     Tobacco Use    Smoking status: Current Every Day Smoker     Packs/day: 1.00     Types: Cigarettes    Smokeless tobacco: Never Used   Substance Use Topics    Alcohol use: Yes     Comment: occas    Drug use: No     Review of Systems   Constitutional: Negative for fever.   HENT: Negative for sore throat.    Respiratory: Negative for shortness of breath.    Cardiovascular: Negative for chest pain.   Gastrointestinal: Negative for abdominal pain, nausea and vomiting.   Genitourinary: Negative for dysuria.   Musculoskeletal: Positive for back pain.   Skin: Negative for rash.   Neurological: Positive for numbness. Negative for weakness.   Hematological: Does not bruise/bleed easily.       Physical Exam     Initial Vitals [07/08/19 2153]   BP Pulse Resp Temp SpO2   (!) 200/119 (!) 121 (!) 22 98.5 °F (36.9 °C) 98 %      MAP       --         Physical Exam    Vitals reviewed.  Constitutional: He appears well-developed and well-nourished. No distress.   HENT:   Head: Normocephalic.   Eyes: Conjunctivae and EOM are normal.   B/l periorbital swelling of superior and inferior lids. No TTP of orbits.    Neck: Normal range of motion.   Cardiovascular: Normal rate and regular rhythm.   No murmur heard.  Pulses:       Dorsalis pedis pulses are 2+ on the left side.        Posterior tibial pulses are 2+ on the left side.   Pulmonary/Chest: Breath sounds normal. No respiratory distress. He has no wheezes. He has no rales.   Abdominal: Soft. Bowel sounds are normal. He exhibits no distension. There is no tenderness.   Musculoskeletal: Normal range of motion.   No midline spinal tenderness. TTP over left lumbar paraspinal musculature. Right  BKA with prosthesis.    Neurological: He is alert and oriented to person, place, and time. No sensory deficit.   Skin: Skin is warm and dry. No erythema.         ED Course   Procedures  Labs Reviewed   CBC W/ AUTO DIFFERENTIAL - Abnormal; Notable for the following components:       Result Value    Immature Granulocytes 0.6 (*)     Immature Grans (Abs) 0.06 (*)     Mono # 1.2 (*)     All other components within normal limits   COMPREHENSIVE METABOLIC PANEL - Abnormal; Notable for the following components:    CO2 21 (*)     ALT 9 (*)     All other components within normal limits   URINALYSIS, REFLEX TO URINE CULTURE - Abnormal; Notable for the following components:    Protein, UA 1+ (*)     Ketones, UA Trace (*)     All other components within normal limits    Narrative:     Preferred Collection Type->Urine, Clean Catch   URINALYSIS MICROSCOPIC - Abnormal; Notable for the following components:    Hyaline Casts, UA 4 (*)     All other components within normal limits    Narrative:     Preferred Collection Type->Urine, Clean Catch   PROTIME-INR   MAGNESIUM          Imaging Results          X-Ray Lumbar Spine Ap And Lateral (Final result)  Result time 07/09/19 00:21:06    Final result by Rigoberto Tinajero MD (07/09/19 00:21:06)                 Impression:      No acute lumbar fracture.      Electronically signed by: Rigoberto Tinajero MD  Date:    07/09/2019  Time:    00:21             Narrative:    EXAMINATION:  XR LUMBAR SPINE AP AND LATERAL    CLINICAL HISTORY:  Low back pain, minor trauma;    TECHNIQUE:  AP, lateral and spot images were performed of the lumbar spine.    COMPARISON:  None    FINDINGS:  Alignment: Alignment is maintained.    Vertebrae: Vertebral body heights are maintained.  No suspicious appearing lytic or blastic lesions.    Discs and facets: Disc heights are maintained.  Facet joints are unremarkable.    Miscellaneous: Atherosclerotic calcification in the aorta.                                 Medical  Decision Making:   History:   Old Medical Records: I decided to obtain old medical records.  Clinical Tests:   Lab Tests: Ordered and Reviewed  Radiological Study: Ordered and Reviewed       APC / Resident Notes:   Patient is a 39 year old male presents to the ED for emergent evaluation of low back pain.     Will order labs and imaging. Will order pain medication for symptomatic relief. Will continue to monitor.     Differential diagnoses include, but are not limited to: lumbar strain, compression fracture, arthritis, osteoporosis, sciatica, sacroiliac joint dysfunction, and lumbar degenerative disk disease.    No leukocytosis. Hemodynamically stable. UA unremarkable for infectious process. Xray lumbar found to have No acute lumbar fracture.    Patient reassessed, reports symptoms improved with ED management. I have discussed emergency department findings, and plan with the patient. Will discharge home with naprosyn and Lidoderm patches and F/U with PCP. Patient verbalizes understanding of plan and agrees. Return precautions given.     I have discussed and reviewed with my supervising physician.        Clinical Impression:       ICD-10-CM ICD-9-CM   1. Left-sided low back pain with left-sided sciatica, unspecified chronicity M54.42 724.3         Disposition:   Disposition: Discharged  Condition: Stable                        Christen Solano PA-C  07/09/19 0554

## 2019-07-09 NOTE — ED NOTES
Pt is AAOx4. Pt is uncomfortable. Behavior is age appropriate and pleasant. Skin is warm, dry, and intact. Mucous membranes are pink and moist. Airway is patent.  Respirations are full, even, and non labored. Breath sounds are present and clear throughout all lung fields.  Abdomen is soft, flat, non distended, and non tender x 4 quads. Normoactive bowel sounds present x 4 quadrants. Pulses are palpable in bilateral radial arteries. Capillary refill is brisk and  < 3 seconds in bilateral fingers. S1, S2 heart tones are present. No neuro deficits noted. Pt has a right AKA. Pt's identity confirmed and armband applied. Pt updated on plan of care. Pt awaiting ED physician. No needs verbalized. Pain at this time is 10/10 to lower back.

## 2019-08-27 ENCOUNTER — TELEPHONE (OUTPATIENT)
Dept: INTERNAL MEDICINE | Facility: CLINIC | Age: 39
End: 2019-08-27

## 2019-08-27 NOTE — TELEPHONE ENCOUNTER
Pt was not seen in clinic , due to multiple attempts to make an appointment   Unable to get rid of note

## 2020-02-20 ENCOUNTER — HOSPITAL ENCOUNTER (INPATIENT)
Facility: HOSPITAL | Age: 40
LOS: 6 days | Discharge: HOME | DRG: 253 | End: 2020-02-27
Attending: EMERGENCY MEDICINE | Admitting: HOSPITALIST
Payer: MEDICARE

## 2020-02-20 DIAGNOSIS — R07.9 CHEST PAIN: ICD-10-CM

## 2020-02-20 DIAGNOSIS — I70.209 ARTERIAL OCCLUSION, LOWER EXTREMITY: Primary | ICD-10-CM

## 2020-02-20 DIAGNOSIS — Z01.810 PREOP CARDIOVASCULAR EXAM: ICD-10-CM

## 2020-02-20 DIAGNOSIS — I99.8 LIMB ISCHEMIA: ICD-10-CM

## 2020-02-20 DIAGNOSIS — I73.9 PAD (PERIPHERAL ARTERY DISEASE): ICD-10-CM

## 2020-02-20 LAB
ALBUMIN SERPL BCP-MCNC: 4.4 G/DL (ref 3.5–5.2)
ALP SERPL-CCNC: 104 U/L (ref 55–135)
ALT SERPL W/O P-5'-P-CCNC: 16 U/L (ref 10–44)
ANION GAP SERPL CALC-SCNC: 17 MMOL/L (ref 8–16)
APTT BLDCRRT: 36.6 SEC (ref 21–32)
AST SERPL-CCNC: 35 U/L (ref 10–40)
BASOPHILS # BLD AUTO: 0.07 K/UL (ref 0–0.2)
BASOPHILS NFR BLD: 0.6 % (ref 0–1.9)
BILIRUB SERPL-MCNC: 0.5 MG/DL (ref 0.1–1)
BUN SERPL-MCNC: 8 MG/DL (ref 6–20)
CALCIUM SERPL-MCNC: 9.7 MG/DL (ref 8.7–10.5)
CHLORIDE SERPL-SCNC: 103 MMOL/L (ref 95–110)
CO2 SERPL-SCNC: 19 MMOL/L (ref 23–29)
CREAT SERPL-MCNC: 0.8 MG/DL (ref 0.5–1.4)
DIFFERENTIAL METHOD: NORMAL
EOSINOPHIL # BLD AUTO: 0.1 K/UL (ref 0–0.5)
EOSINOPHIL NFR BLD: 1 % (ref 0–8)
ERYTHROCYTE [DISTWIDTH] IN BLOOD BY AUTOMATED COUNT: 12.7 % (ref 11.5–14.5)
EST. GFR  (AFRICAN AMERICAN): >60 ML/MIN/1.73 M^2
EST. GFR  (NON AFRICAN AMERICAN): >60 ML/MIN/1.73 M^2
GLUCOSE SERPL-MCNC: 81 MG/DL (ref 70–110)
HCT VFR BLD AUTO: 46.8 % (ref 40–54)
HGB BLD-MCNC: 15.9 G/DL (ref 14–18)
IMM GRANULOCYTES # BLD AUTO: 0.04 K/UL (ref 0–0.04)
IMM GRANULOCYTES NFR BLD AUTO: 0.3 % (ref 0–0.5)
INR PPP: 1 (ref 0.8–1.2)
LYMPHOCYTES # BLD AUTO: 4.5 K/UL (ref 1–4.8)
LYMPHOCYTES NFR BLD: 38.7 % (ref 18–48)
MCH RBC QN AUTO: 29.6 PG (ref 27–31)
MCHC RBC AUTO-ENTMCNC: 34 G/DL (ref 32–36)
MCV RBC AUTO: 87 FL (ref 82–98)
MONOCYTES # BLD AUTO: 0.9 K/UL (ref 0.3–1)
MONOCYTES NFR BLD: 7.6 % (ref 4–15)
NEUTROPHILS # BLD AUTO: 6 K/UL (ref 1.8–7.7)
NEUTROPHILS NFR BLD: 51.8 % (ref 38–73)
NRBC BLD-RTO: 0 /100 WBC
PLATELET # BLD AUTO: 292 K/UL (ref 150–350)
PMV BLD AUTO: 9.9 FL (ref 9.2–12.9)
POTASSIUM SERPL-SCNC: 3.4 MMOL/L (ref 3.5–5.1)
PROT SERPL-MCNC: 8.2 G/DL (ref 6–8.4)
PROTHROMBIN TIME: 10.5 SEC (ref 9–12.5)
RBC # BLD AUTO: 5.37 M/UL (ref 4.6–6.2)
SODIUM SERPL-SCNC: 139 MMOL/L (ref 136–145)
WBC # BLD AUTO: 11.5 K/UL (ref 3.9–12.7)

## 2020-02-20 PROCEDURE — 96374 THER/PROPH/DIAG INJ IV PUSH: CPT

## 2020-02-20 PROCEDURE — 99291 CRITICAL CARE FIRST HOUR: CPT | Mod: 25

## 2020-02-20 PROCEDURE — 85730 THROMBOPLASTIN TIME PARTIAL: CPT

## 2020-02-20 PROCEDURE — 96375 TX/PRO/DX INJ NEW DRUG ADDON: CPT

## 2020-02-20 PROCEDURE — 63600175 PHARM REV CODE 636 W HCPCS: Performed by: EMERGENCY MEDICINE

## 2020-02-20 PROCEDURE — 85610 PROTHROMBIN TIME: CPT

## 2020-02-20 PROCEDURE — 85025 COMPLETE CBC W/AUTO DIFF WBC: CPT

## 2020-02-20 PROCEDURE — 96361 HYDRATE IV INFUSION ADD-ON: CPT

## 2020-02-20 PROCEDURE — 25000003 PHARM REV CODE 250: Performed by: EMERGENCY MEDICINE

## 2020-02-20 PROCEDURE — 96376 TX/PRO/DX INJ SAME DRUG ADON: CPT

## 2020-02-20 PROCEDURE — 80053 COMPREHEN METABOLIC PANEL: CPT

## 2020-02-20 RX ORDER — HEPARIN SODIUM 5000 [USP'U]/ML
80 INJECTION, SOLUTION INTRAVENOUS; SUBCUTANEOUS
Status: DISCONTINUED | OUTPATIENT
Start: 2020-02-20 | End: 2020-02-20

## 2020-02-20 RX ORDER — MORPHINE SULFATE 4 MG/ML
4 INJECTION, SOLUTION INTRAMUSCULAR; INTRAVENOUS
Status: COMPLETED | OUTPATIENT
Start: 2020-02-20 | End: 2020-02-20

## 2020-02-20 RX ORDER — ASPIRIN 325 MG
325 TABLET ORAL
Status: COMPLETED | OUTPATIENT
Start: 2020-02-20 | End: 2020-02-20

## 2020-02-20 RX ORDER — MORPHINE SULFATE 4 MG/ML
4 INJECTION, SOLUTION INTRAMUSCULAR; INTRAVENOUS
Status: COMPLETED | OUTPATIENT
Start: 2020-02-21 | End: 2020-02-21

## 2020-02-20 RX ORDER — HEPARIN SODIUM 5000 [USP'U]/ML
5000 INJECTION, SOLUTION INTRAVENOUS; SUBCUTANEOUS
Status: DISCONTINUED | OUTPATIENT
Start: 2020-02-20 | End: 2020-02-20

## 2020-02-20 RX ORDER — HEPARIN SODIUM,PORCINE/D5W 25000/250
18 INTRAVENOUS SOLUTION INTRAVENOUS CONTINUOUS
Status: DISCONTINUED | OUTPATIENT
Start: 2020-02-21 | End: 2020-02-26

## 2020-02-20 RX ORDER — CLOPIDOGREL BISULFATE 75 MG/1
600 TABLET ORAL
Status: COMPLETED | OUTPATIENT
Start: 2020-02-20 | End: 2020-02-20

## 2020-02-20 RX ADMIN — ASPIRIN 325 MG ORAL TABLET 325 MG: 325 PILL ORAL at 11:02

## 2020-02-20 RX ADMIN — HEPARIN SODIUM 18 UNITS/KG/HR: 10000 INJECTION, SOLUTION INTRAVENOUS at 11:02

## 2020-02-20 RX ADMIN — CLOPIDOGREL BISULFATE 600 MG: 75 TABLET ORAL at 11:02

## 2020-02-20 RX ADMIN — SODIUM CHLORIDE 1000 ML: 0.9 INJECTION, SOLUTION INTRAVENOUS at 09:02

## 2020-02-20 RX ADMIN — MORPHINE SULFATE 4 MG: 4 INJECTION INTRAVENOUS at 10:02

## 2020-02-20 NOTE — Clinical Note
Angiography performed of the right superficial femoral artery. via hand injection with 20 mL of contrast.

## 2020-02-20 NOTE — Clinical Note
Angiography performed post intervention of the right superficial femoral artery. via hand injection with 10 mL of contrast.

## 2020-02-20 NOTE — Clinical Note
150 ml injected throughout the case. 100 mL total wasted during the case. 250 mL total used in the case.

## 2020-02-21 PROBLEM — Z79.01 WARFARIN ANTICOAGULATION: Status: ACTIVE | Noted: 2019-02-15

## 2020-02-21 PROBLEM — I99.8 LIMB ISCHEMIA: Status: ACTIVE | Noted: 2020-02-21

## 2020-02-21 PROBLEM — I99.9: Status: RESOLVED | Noted: 2019-02-04 | Resolved: 2020-02-21

## 2020-02-21 PROBLEM — I70.229 CRITICAL LOWER LIMB ISCHEMIA: Status: ACTIVE | Noted: 2020-02-21

## 2020-02-21 PROBLEM — Z79.01 WARFARIN ANTICOAGULATION: Status: RESOLVED | Noted: 2019-02-15 | Resolved: 2020-02-21

## 2020-02-21 LAB
APTT BLDCRRT: 106.3 SEC (ref 21–32)
APTT BLDCRRT: 46.1 SEC (ref 21–32)
APTT BLDCRRT: 53.2 SEC (ref 21–32)
BASOPHILS # BLD AUTO: 0.07 K/UL (ref 0–0.2)
BASOPHILS NFR BLD: 0.6 % (ref 0–1.9)
DIFFERENTIAL METHOD: NORMAL
EOSINOPHIL # BLD AUTO: 0.2 K/UL (ref 0–0.5)
EOSINOPHIL NFR BLD: 1.3 % (ref 0–8)
ERYTHROCYTE [DISTWIDTH] IN BLOOD BY AUTOMATED COUNT: 13.2 % (ref 11.5–14.5)
HCT VFR BLD AUTO: 43.4 % (ref 40–54)
HGB BLD-MCNC: 14.5 G/DL (ref 14–18)
IMM GRANULOCYTES # BLD AUTO: 0.03 K/UL (ref 0–0.04)
IMM GRANULOCYTES NFR BLD AUTO: 0.3 % (ref 0–0.5)
LYMPHOCYTES # BLD AUTO: 3.8 K/UL (ref 1–4.8)
LYMPHOCYTES NFR BLD: 33.5 % (ref 18–48)
MCH RBC QN AUTO: 29.4 PG (ref 27–31)
MCHC RBC AUTO-ENTMCNC: 33.4 G/DL (ref 32–36)
MCV RBC AUTO: 88 FL (ref 82–98)
MONOCYTES # BLD AUTO: 0.7 K/UL (ref 0.3–1)
MONOCYTES NFR BLD: 6.2 % (ref 4–15)
NEUTROPHILS # BLD AUTO: 6.6 K/UL (ref 1.8–7.7)
NEUTROPHILS NFR BLD: 58.1 % (ref 38–73)
NRBC BLD-RTO: 0 /100 WBC
PLATELET # BLD AUTO: 228 K/UL (ref 150–350)
PLATELET BLD QL SMEAR: NORMAL
PMV BLD AUTO: 10 FL (ref 9.2–12.9)
RBC # BLD AUTO: 4.94 M/UL (ref 4.6–6.2)
WBC # BLD AUTO: 11.29 K/UL (ref 3.9–12.7)

## 2020-02-21 PROCEDURE — 99233 PR SUBSEQUENT HOSPITAL CARE,LEVL III: ICD-10-PCS | Mod: ,,, | Performed by: INTERNAL MEDICINE

## 2020-02-21 PROCEDURE — 25000003 PHARM REV CODE 250: Performed by: INTERNAL MEDICINE

## 2020-02-21 PROCEDURE — 36415 COLL VENOUS BLD VENIPUNCTURE: CPT

## 2020-02-21 PROCEDURE — 25000003 PHARM REV CODE 250: Performed by: NURSE PRACTITIONER

## 2020-02-21 PROCEDURE — 63600175 PHARM REV CODE 636 W HCPCS: Performed by: NURSE PRACTITIONER

## 2020-02-21 PROCEDURE — 11000001 HC ACUTE MED/SURG PRIVATE ROOM

## 2020-02-21 PROCEDURE — 85025 COMPLETE CBC W/AUTO DIFF WBC: CPT

## 2020-02-21 PROCEDURE — 99233 SBSQ HOSP IP/OBS HIGH 50: CPT | Mod: ,,, | Performed by: INTERNAL MEDICINE

## 2020-02-21 PROCEDURE — 85730 THROMBOPLASTIN TIME PARTIAL: CPT

## 2020-02-21 PROCEDURE — 63600175 PHARM REV CODE 636 W HCPCS: Performed by: EMERGENCY MEDICINE

## 2020-02-21 PROCEDURE — 85730 THROMBOPLASTIN TIME PARTIAL: CPT | Mod: 91

## 2020-02-21 RX ORDER — CLOPIDOGREL BISULFATE 75 MG/1
75 TABLET ORAL DAILY
Status: DISCONTINUED | OUTPATIENT
Start: 2020-02-21 | End: 2020-02-27 | Stop reason: HOSPADM

## 2020-02-21 RX ORDER — SODIUM CHLORIDE 0.9 % (FLUSH) 0.9 %
10 SYRINGE (ML) INJECTION
Status: DISCONTINUED | OUTPATIENT
Start: 2020-02-21 | End: 2020-02-27 | Stop reason: HOSPADM

## 2020-02-21 RX ORDER — HYDROCHLOROTHIAZIDE 12.5 MG/1
12.5 TABLET ORAL ONCE
Status: DISCONTINUED | OUTPATIENT
Start: 2020-02-21 | End: 2020-02-21

## 2020-02-21 RX ORDER — HEPARIN SODIUM 5000 [USP'U]/ML
80 INJECTION, SOLUTION INTRAVENOUS; SUBCUTANEOUS
Status: COMPLETED | OUTPATIENT
Start: 2020-02-21 | End: 2020-02-21

## 2020-02-21 RX ORDER — ASPIRIN 81 MG/1
81 TABLET ORAL DAILY
Status: DISCONTINUED | OUTPATIENT
Start: 2020-02-21 | End: 2020-02-27 | Stop reason: HOSPADM

## 2020-02-21 RX ORDER — MORPHINE SULFATE 4 MG/ML
4 INJECTION, SOLUTION INTRAMUSCULAR; INTRAVENOUS EVERY 4 HOURS PRN
Status: DISCONTINUED | OUTPATIENT
Start: 2020-02-21 | End: 2020-02-27 | Stop reason: HOSPADM

## 2020-02-21 RX ORDER — LISINOPRIL 20 MG/1
20 TABLET ORAL DAILY
Status: DISCONTINUED | OUTPATIENT
Start: 2020-02-21 | End: 2020-02-27 | Stop reason: HOSPADM

## 2020-02-21 RX ORDER — ATORVASTATIN CALCIUM 40 MG/1
40 TABLET, FILM COATED ORAL DAILY
Status: DISCONTINUED | OUTPATIENT
Start: 2020-02-21 | End: 2020-02-22

## 2020-02-21 RX ORDER — HYDROCHLOROTHIAZIDE 12.5 MG/1
12.5 TABLET ORAL ONCE
Status: COMPLETED | OUTPATIENT
Start: 2020-02-21 | End: 2020-02-21

## 2020-02-21 RX ORDER — LISINOPRIL 10 MG/1
20 TABLET ORAL
Status: COMPLETED | OUTPATIENT
Start: 2020-02-21 | End: 2020-02-21

## 2020-02-21 RX ORDER — METOPROLOL TARTRATE 50 MG/1
50 TABLET ORAL 2 TIMES DAILY
COMMUNITY

## 2020-02-21 RX ORDER — HYDRALAZINE HYDROCHLORIDE 20 MG/ML
10 INJECTION INTRAMUSCULAR; INTRAVENOUS EVERY 8 HOURS PRN
Status: DISCONTINUED | OUTPATIENT
Start: 2020-02-21 | End: 2020-02-21

## 2020-02-21 RX ORDER — ACETAMINOPHEN 325 MG/1
650 TABLET ORAL EVERY 4 HOURS PRN
Status: DISCONTINUED | OUTPATIENT
Start: 2020-02-21 | End: 2020-02-27 | Stop reason: HOSPADM

## 2020-02-21 RX ORDER — AMLODIPINE BESYLATE 5 MG/1
10 TABLET ORAL DAILY
Status: DISCONTINUED | OUTPATIENT
Start: 2020-02-21 | End: 2020-02-27 | Stop reason: HOSPADM

## 2020-02-21 RX ORDER — LISINOPRIL AND HYDROCHLOROTHIAZIDE 12.5; 2 MG/1; MG/1
1 TABLET ORAL DAILY
COMMUNITY

## 2020-02-21 RX ORDER — ONDANSETRON 2 MG/ML
4 INJECTION INTRAMUSCULAR; INTRAVENOUS EVERY 8 HOURS PRN
Status: DISCONTINUED | OUTPATIENT
Start: 2020-02-21 | End: 2020-02-27 | Stop reason: HOSPADM

## 2020-02-21 RX ORDER — HYDRALAZINE HYDROCHLORIDE 20 MG/ML
10 INJECTION INTRAMUSCULAR; INTRAVENOUS EVERY 6 HOURS PRN
Status: DISCONTINUED | OUTPATIENT
Start: 2020-02-21 | End: 2020-02-27 | Stop reason: HOSPADM

## 2020-02-21 RX ORDER — HYDROCHLOROTHIAZIDE 12.5 MG/1
12.5 TABLET ORAL DAILY
Status: DISCONTINUED | OUTPATIENT
Start: 2020-02-21 | End: 2020-02-27 | Stop reason: HOSPADM

## 2020-02-21 RX ADMIN — MORPHINE SULFATE 4 MG: 4 INJECTION INTRAVENOUS at 05:02

## 2020-02-21 RX ADMIN — AMLODIPINE BESYLATE 10 MG: 5 TABLET ORAL at 02:02

## 2020-02-21 RX ADMIN — LISINOPRIL 20 MG: 10 TABLET ORAL at 01:02

## 2020-02-21 RX ADMIN — CLOPIDOGREL 75 MG: 75 TABLET, FILM COATED ORAL at 09:02

## 2020-02-21 RX ADMIN — MORPHINE SULFATE 4 MG: 4 INJECTION INTRAVENOUS at 07:02

## 2020-02-21 RX ADMIN — ATORVASTATIN CALCIUM 40 MG: 40 TABLET, FILM COATED ORAL at 12:02

## 2020-02-21 RX ADMIN — HEPARIN SODIUM 14 UNITS/KG/HR: 10000 INJECTION, SOLUTION INTRAVENOUS at 08:02

## 2020-02-21 RX ADMIN — ASPIRIN 81 MG: 81 TABLET, COATED ORAL at 09:02

## 2020-02-21 RX ADMIN — MORPHINE SULFATE 4 MG: 4 INJECTION INTRAVENOUS at 11:02

## 2020-02-21 RX ADMIN — LISINOPRIL 20 MG: 20 TABLET ORAL at 09:02

## 2020-02-21 RX ADMIN — MORPHINE SULFATE 4 MG: 4 INJECTION INTRAVENOUS at 12:02

## 2020-02-21 RX ADMIN — MORPHINE SULFATE 4 MG: 4 INJECTION INTRAVENOUS at 02:02

## 2020-02-21 RX ADMIN — MORPHINE SULFATE 4 MG: 4 INJECTION INTRAVENOUS at 10:02

## 2020-02-21 RX ADMIN — HEPARIN SODIUM 14 UNITS/KG/HR: 10000 INJECTION, SOLUTION INTRAVENOUS at 07:02

## 2020-02-21 RX ADMIN — HYDROCHLOROTHIAZIDE 12.5 MG: 12.5 TABLET ORAL at 03:02

## 2020-02-21 RX ADMIN — HYDROCHLOROTHIAZIDE 12.5 MG: 12.5 TABLET ORAL at 12:02

## 2020-02-21 RX ADMIN — HEPARIN SODIUM 6450 UNITS: 5000 INJECTION, SOLUTION INTRAVENOUS; SUBCUTANEOUS at 12:02

## 2020-02-21 NOTE — PLAN OF CARE
Pt lives with spouse.  He has crutches, shower chair and wheelchair at home.  He states he is able to care for himself at home with assistive devices at home.  He usually drives himself to appts.  White board updated with CM name and contact information.  Discharge brochure provided.  Pt encouraged to call with any questions or concerns.  Cm will continue to follow pt through transitions of care and assist with any discharge needs.       02/21/20 0815   Discharge Assessment   Assessment Type Discharge Planning Assessment   Confirmed/corrected address and phone number on facesheet? Yes   Assessment information obtained from? Patient   Communicated expected length of stay with patient/caregiver yes   Prior to hospitilization cognitive status: Alert/Oriented   Prior to hospitalization functional status: Assistive Equipment   Current cognitive status: Alert/Oriented   Current Functional Status: Assistive Equipment   Facility Arrived From: home   Lives With spouse   Able to Return to Prior Arrangements yes   Is patient able to care for self after discharge? Yes   Patient's perception of discharge disposition home or selfcare   Readmission Within the Last 30 Days no previous admission in last 30 days   Patient currently being followed by outpatient case management? No   Patient currently receives any other outside agency services? No   Equipment Currently Used at Home crutches;shower chair;wheelchair   Do you have any problems affording any of your prescribed medications? No   Is the patient taking medications as prescribed? yes   Does the patient have transportation home? Yes   Transportation Anticipated family or friend will provide   Does the patient receive services at the [facility]? No   Discharge Plan A Home with family   Discharge Plan B Home with family;Home Health   DME Needed Upon Discharge  none   Patient/Family in Agreement with Plan yes     Live Lane RN,   [phone]

## 2020-02-21 NOTE — ASSESSMENT & PLAN NOTE
Antiphospholipid antibody positive  History of right below knee amputation  Giving aspirin, clopidrogel, heparin infusion. Appreciate Cardiology.

## 2020-02-21 NOTE — ED NOTES
RENETTA Hernandez NP on telephone. Notified of persistent HTN ranging 180-210 systolic. Informed RENETTA Hernandez NP of conversation w/Dr. Russell and orders received with parameters. Dr. Marr picked up the telephone and spoke to RENETTA Hernandez NP to discuss addressing HTN. RENETTA Hernandez NP was notified that patient is already transferred to tele floor.

## 2020-02-21 NOTE — HOSPITAL COURSE
02/21/2020 RLE discoloration which demarcates at the proximal level of his prosthesis noted. Stump noted warm to touch. Pain persists. SBP elevated- medications adjusted. Heparin gtt continued.   2/22/20: Continues to have right leg soreness. Denies any worsening symptoms. Continued hep gtt  2/23/20: Noted to have increased swelling in the right leg stump and felt cooler overnight. Better today. Soreness continues (no worsening)  2/25/20: peripheral angiogram yesterday with SFA and POP lesions. Discussed with Dr Good. Planned for revascularization tomorrow. Soreness continues (no worsening). Popliteal pulses positive by Doppler

## 2020-02-21 NOTE — NURSING
Pt transfer to room 429. Educated on importance of keeping right stump straight and no movement in order not to dislodge clot. Pt also reminded of NPO status. Pt AAOx4, voice complete understanding of both, stated he will comply.

## 2020-02-21 NOTE — ASSESSMENT & PLAN NOTE
Hyperlipidemia     Goal -160. Patient noncompliant with home ASA, amlodipine, lisinopril-hctz and atorvastatin (has not taken in 1 month)

## 2020-02-21 NOTE — NURSING
PTT resulted 106.3. Heparin drip stopped. Pt denies any complaints. No bleeding noted/voiced. Pt wide awake. Oncoming nurse, denise informed. Nomogram printed out/shown to nurse to follow.

## 2020-02-21 NOTE — ED NOTES
RENETTA Hernandez NP notified of patient's BP consistently with systolic ranging 180-200. Inquired if goal was permissive HTN and requested VS parameters. RENETTA Hernandez NP requested that RN call cardiology to make recommendations for treatment of HTN.  Call placed to Dr. Mabry, cardiologist on call. Reviewed patient's home med list with MD and hypertensive trends since arrival to ED. Requested VS parameters and orders to address current HTN.  Notified Dr. Mabry that patient is currently outside of ordered parameters of systolic goal 140-160. Requested one-time dose of fast-acting medication to lower BP to closer to goal prior to transferring patient to floor. MD gave orders and stressed to stagger Lisinopril and HCTZ 3 hours apart. Updated report given to JOSE Wells on telemetry unit.

## 2020-02-21 NOTE — HPI
38 yo male h/o right BKA following DVT 2012, compartment syndrome left leg sp fasciotomy 2015, HTN, active tobacco user, and medical noncompliance. Patient presented for acute onset pain and swelling right leg stump, starting between noon and 2 pm 02/20/20. He reports the leg felt sore to him on 02/19/20. Patient then noted the leg was swollen, as the prosthesis was not fitting (usually slides right in).  Of note, he was recommended to come off his coumadin a few months ago, and was only on aspirin 81 mg daily. Patient was placed on a heparin gtt and loaded with 600 mg Plavix in the ED out of concern for acute limb ischemia. Patient was examined in the ED by Dr Garay and his right leg stump was tender to touch and cool. The knee and proximal portions of the leg were warm. The skin has a red discoloration, which clearly demarcates at the upper thigh level (appears to be from the prosthesis as was placed on the swollen limb). He was able to move the leg at the knee.     Ultrasound RLE suggestive of DFA stenosis. Occlusion of distal  SFA and pop arteries.      Findings were discussed with Dr Vallecillo who recommended heparin gtt, aspirin 81 mg daily, clopidogrel 75 mg daily and to re-evaluate in AM to determine revascularization plan.     Smoking cessation was also encouraged.

## 2020-02-21 NOTE — ASSESSMENT & PLAN NOTE
Hyperlipidemia   Noncompliant with prescribed aspirin, amlodipine, lisinopril-hydrochlorothiazide, atorvastatin. Resumed.

## 2020-02-21 NOTE — PROGRESS NOTES
Patient seen and examined. Briefly, 38 yo male h/o right BKA following DVT 2012, compartment syndrome left leg sp fasciotomy 2015, HTN, active tobacco user    Presented today for acute onset pain and swelling right leg stump, starting between noon and 2 pm today. He reports the leg felt sore to him yesterday. Today he noted the leg was swollen, as the prosthesis was not fitting (usually slides right in). There was some concern with LLE swelling, but they report it as resolved at this time. Of note, he was recommended to come off his coumadin a few months ago, and was only on aspirin 81 mg daily.    On examination, the right leg stump is tender to touch and cool. The knee and proximal portions of the leg are warm. The skin has a red discoloration, which clearly demarcates at the upper thigh level (appears to be from the prosthesis as was placed on the swollen limb). He is able to move the leg at the knee. Left leg does not appear swollen, or discolored. LLE is warm to touch, DP and PT 2+.    Ultrasound RLE suggestive of DFA stenosis. Occlusion of distal  SFA and pop arteries.     Discussed findings with Dr Vallecillo. Recommends continue heparin gtt, aspirin 81 mg daily, clopidogrel 75 mg daily. NPO now. Re-evaluate in AM to determine revascularization plan.    Discussed tobacco use. He states he wants to quit.

## 2020-02-21 NOTE — SUBJECTIVE & OBJECTIVE
Past Medical History:   Diagnosis Date    Amputation of lower limb     left below the knee    Compartment syndrome of left lower extremity     Compartment syndrome of right lower extremity     Depression 3/18/2016    DVT (deep venous thrombosis)     Encounter for cholesteral screening for cardiovascular disease     Hypertension     Tachycardia        Past Surgical History:   Procedure Laterality Date    AMPUTATION, LOWER LIMB  2012    right BKA    KNEE SURGERY      TIBIAL FASCIOTOMY Right 12/08/2015       Review of patient's allergies indicates:  No Known Allergies    No current facility-administered medications on file prior to encounter.      Current Outpatient Medications on File Prior to Encounter   Medication Sig    ibuprofen (ADVIL,MOTRIN) 200 MG tablet Take 2 tablets (400 mg total) by mouth every 6 (six) hours as needed for Pain.    alprazolam (XANAX) 0.5 MG tablet Take 0.5 mg by mouth 3 (three) times daily as needed for Anxiety.     amlodipine (NORVASC) 10 MG tablet Take 1 tablet (10 mg total) by mouth once daily.    aspirin 81 MG Chew Take 1 tablet (81 mg total) by mouth once daily.    atorvastatin (LIPITOR) 40 MG tablet Take 40 mg by mouth once daily.    diclofenac (VOLTAREN) 50 MG EC tablet Take 1 tablet (50 mg total) by mouth 2 (two) times daily.    duloxetine (CYMBALTA) 60 MG capsule Take 60 mg by mouth once daily.    gabapentin (NEURONTIN) 300 MG capsule Take 2 capsules (600 mg total) by mouth 3 (three) times daily. Take one tablet by mouth 3 to 4 times daily    hydrocodone-acetaminophen 5-325mg (NORCO) 5-325 mg per tablet Take 1 tablet by mouth every 8 (eight) hours as needed.    INV LISINOPRIL-HCTZ 20-12.5 Take 1 tablet by mouth once daily. For investigational use only.    lidocaine (LIDODERM) 5 % Place 1 patch onto the skin every 24 hours as needed. Remove & Discard patch within 12 hours or as directed by MD    naproxen (NAPROSYN) 500 MG tablet Take 500 mg by mouth 2 (two)  times daily.    traMADol (ULTRAM) 50 mg tablet Take 1 tablet (50 mg total) by mouth every 6 (six) hours as needed for Pain.    warfarin (COUMADIN) 5 MG tablet Take 2.5 tablets (12.5 mg total) by mouth Daily. Or as directed by [facility]madin Clinic     Family History     Problem Relation (Age of Onset)    Heart disease Father    Hypertension Mother        Tobacco Use    Smoking status: Current Every Day Smoker     Packs/day: 1.00     Types: Cigarettes    Smokeless tobacco: Never Used   Substance and Sexual Activity    Alcohol use: Yes     Comment: occasional use    Drug use: No    Sexual activity: Yes     Partners: Female     Review of Systems   Constitutional: Negative for chills and fever.   Eyes: Negative for photophobia.   Respiratory: Negative for cough, chest tightness, shortness of breath and wheezing.    Cardiovascular: Negative for chest pain, palpitations and leg swelling.   Gastrointestinal: Negative for abdominal pain, diarrhea, nausea and vomiting.   Genitourinary: Negative for dysuria, flank pain and hematuria.   Musculoskeletal: Positive for arthralgias. Negative for back pain, myalgias and neck pain.   Skin: Positive for color change (right leg ). Negative for rash and wound.   Neurological: Negative for dizziness, syncope and headaches.   Psychiatric/Behavioral: Negative for agitation.     Objective:     Vital Signs (Most Recent):  Temp: 97.7 °F (36.5 °C) (02/21/20 0243)  Pulse: 93 (02/21/20 0403)  Resp: 18 (02/21/20 0243)  BP: (!) 169/89(dr foss notified see NN) (02/21/20 0243)  SpO2: 99 % (02/21/20 0243) Vital Signs (24h Range):  Temp:  [97.7 °F (36.5 °C)-98.7 °F (37.1 °C)] 97.7 °F (36.5 °C)  Pulse:  [] 93  Resp:  [15-23] 18  SpO2:  [95 %-99 %] 99 %  BP: (169-227)/() 169/89     Weight: 95.3 kg (210 lb)  Body mass index is 31.01 kg/m².    Physical Exam   Constitutional: He is oriented to person, place, and time. He appears well-developed and well-nourished. No distress.   HENT:    Head: Normocephalic and atraumatic.   Eyes: Pupils are equal, round, and reactive to light. Conjunctivae are normal.   Neck: Normal range of motion. Neck supple. No JVD present.   Cardiovascular: Normal rate, regular rhythm, normal heart sounds and intact distal pulses.   Well demarcated skin changes to the RLE in the proximal thigh, consisting of dusky appearance that worsens as you go distally to his BKA stump.  RLE is cold to touch    Pulmonary/Chest: Effort normal and breath sounds normal. No respiratory distress. He has no wheezes.   Abdominal: Soft. Bowel sounds are normal. He exhibits no distension. There is no tenderness. There is no guarding.   Musculoskeletal: He exhibits no edema or tenderness.   Neurological: He is alert and oriented to person, place, and time.   Skin: Skin is warm and dry. Capillary refill takes less than 2 seconds.   Psychiatric: He has a normal mood and affect. His behavior is normal.         CRANIAL NERVES     CN III, IV, VI   Pupils are equal, round, and reactive to light.       Significant Labs:   BMP:   Recent Labs   Lab 02/20/20  2158   GLU 81      K 3.4*      CO2 19*   BUN 8   CREATININE 0.8   CALCIUM 9.7     CBC:   Recent Labs   Lab 02/20/20  2158   WBC 11.50   HGB 15.9   HCT 46.8          Significant Imaging: I have reviewed all pertinent imaging results/findings within the past 24 hours.

## 2020-02-21 NOTE — PLAN OF CARE
"Admit completed per flowsheet. Patient's questions answered.  Pt instructed on VTE, diet, I&Os, tests, fall precautions and medications. Understanding verbalized. Patient complains of pain to his right leg rated an "8" at this time.  "

## 2020-02-21 NOTE — CONSULTS
[facility]  Cardiology  Consult Note    Patient Name: Ren Grove  MRN: [id number]  Admission Date: 2/20/2020  Hospital Length of Stay: 0 days  Code Status: Full Code   Attending Provider: Andrés Fuentes MD   Consulting Provider: Ag Eller NP  Primary Care Physician: Nomi Bermeo MD  Principal Problem:Critical lower limb ischemia    Patient information was obtained from patient, past medical records and ER records.     Inpatient consult to Interventional Cardiology  Consult performed by: Ag Eller NP  Consult ordered by: Johan Marr MD        Subjective:     Chief Complaint:  RLE pain      HPI:   40 yo male h/o right BKA following DVT 2012, compartment syndrome left leg sp fasciotomy 2015, HTN, active tobacco user, and medical noncompliance. Patient presented for acute onset pain and swelling right leg stump, starting between noon and 2 pm 02/20/20. He reports the leg felt sore to him on 02/19/20. Patient then noted the leg was swollen, as the prosthesis was not fitting (usually slides right in).  Of note, he was recommended to come off his coumadin a few months ago, and was only on aspirin 81 mg daily. Patient was placed on a heparin gtt and loaded with 600 mg Plavix in the ED out of concern for acute limb ischemia. Patient was examined in the ED by Dr Garay and his right leg stump was tender to touch and cool. The knee and proximal portions of the leg were warm. The skin has a red discoloration, which clearly demarcates at the upper thigh level (appears to be from the prosthesis as was placed on the swollen limb). He was able to move the leg at the knee.     Ultrasound RLE suggestive of DFA stenosis. Occlusion of distal  SFA and pop arteries.      Findings were discussed with Dr Vallecillo who recommended heparin gtt, aspirin 81 mg daily, clopidogrel 75 mg daily and to re-evaluate in AM to determine revascularization plan.     Smoking cessation was also encouraged.      Past Medical History:   Diagnosis Date    Amputation of lower limb     left below the knee    Compartment syndrome of left lower extremity     Compartment syndrome of right lower extremity     Depression 3/18/2016    DVT (deep venous thrombosis)     Encounter for cholesteral screening for cardiovascular disease     Hypertension     Tachycardia        Past Surgical History:   Procedure Laterality Date    AMPUTATION, LOWER LIMB  2012    right BKA    KNEE SURGERY      TIBIAL FASCIOTOMY Right 12/08/2015       Review of patient's allergies indicates:  No Known Allergies    No current facility-administered medications on file prior to encounter.      Current Outpatient Medications on File Prior to Encounter   Medication Sig    ibuprofen (ADVIL,MOTRIN) 200 MG tablet Take 2 tablets (400 mg total) by mouth every 6 (six) hours as needed for Pain.    alprazolam (XANAX) 0.5 MG tablet Take 0.5 mg by mouth 3 (three) times daily as needed for Anxiety.     amlodipine (NORVASC) 10 MG tablet Take 1 tablet (10 mg total) by mouth once daily.    aspirin 81 MG Chew Take 1 tablet (81 mg total) by mouth once daily.    atorvastatin (LIPITOR) 40 MG tablet Take 40 mg by mouth once daily.    diclofenac (VOLTAREN) 50 MG EC tablet Take 1 tablet (50 mg total) by mouth 2 (two) times daily.    duloxetine (CYMBALTA) 60 MG capsule Take 60 mg by mouth once daily.    gabapentin (NEURONTIN) 300 MG capsule Take 2 capsules (600 mg total) by mouth 3 (three) times daily. Take one tablet by mouth 3 to 4 times daily    hydrocodone-acetaminophen 5-325mg (NORCO) 5-325 mg per tablet Take 1 tablet by mouth every 8 (eight) hours as needed.    INV LISINOPRIL-HCTZ 20-12.5 Take 1 tablet by mouth once daily. For investigational use only.    lidocaine (LIDODERM) 5 % Place 1 patch onto the skin every 24 hours as needed. Remove & Discard patch within 12 hours or as directed by MD    naproxen (NAPROSYN) 500 MG tablet Take 500 mg by mouth 2 (two)  times daily.    traMADol (ULTRAM) 50 mg tablet Take 1 tablet (50 mg total) by mouth every 6 (six) hours as needed for Pain.    warfarin (COUMADIN) 5 MG tablet Take 2.5 tablets (12.5 mg total) by mouth Daily. Or as directed by Coumadin Clinic     Family History     Problem Relation (Age of Onset)    Heart disease Father    Hypertension Mother        Tobacco Use    Smoking status: Current Every Day Smoker     Packs/day: 1.00     Types: Cigarettes    Smokeless tobacco: Never Used   Substance and Sexual Activity    Alcohol use: Yes     Comment: occasional use    Drug use: No    Sexual activity: Yes     Partners: Female     Review of Systems   Constitution: Negative for diaphoresis and fever.   HENT: Negative.    Eyes: Negative.    Cardiovascular: Positive for leg swelling. Negative for chest pain, dyspnea on exertion, irregular heartbeat, near-syncope, orthopnea, palpitations, paroxysmal nocturnal dyspnea and syncope.   Respiratory: Negative for cough, shortness of breath, sputum production and wheezing.    Endocrine: Negative.    Hematologic/Lymphatic: Negative for bleeding problem. Does not bruise/bleed easily.   Skin: Positive for color change.   Musculoskeletal: Positive for myalgias.   Gastrointestinal: Negative.  Negative for hematemesis, hematochezia, melena, nausea and vomiting.   Genitourinary: Negative.    Neurological: Negative.  Negative for dizziness and light-headedness.   Psychiatric/Behavioral: Negative.    Allergic/Immunologic: Negative.      Objective:     Vital Signs (Most Recent):  Temp: 96.7 °F (35.9 °C) (02/21/20 0604)  Pulse: 86 (02/21/20 0744)  Resp: 18 (02/21/20 0604)  BP: (!) 161/105(dr foss notified) (02/21/20 0604)  SpO2: (!) 90 % (02/21/20 0604) Vital Signs (24h Range):  Temp:  [96.7 °F (35.9 °C)-98.7 °F (37.1 °C)] 96.7 °F (35.9 °C)  Pulse:  [] 86  Resp:  [15-23] 18  SpO2:  [90 %-99 %] 90 %  BP: (161-227)/() 161/105     Weight: 95.3 kg (210 lb)  Body mass index is 31.01  kg/m².    SpO2: (!) 90 %  O2 Device (Oxygen Therapy): room air    No intake or output data in the 24 hours ending 02/21/20 0926    Lines/Drains/Airways     Peripheral Intravenous Line                 Peripheral IV - Single Lumen 02/20/20 2157 18 G Right;Proximal Forearm less than 1 day         Peripheral IV - Single Lumen 02/21/20 0020 18 G Left Antecubital less than 1 day                Physical Exam   Constitutional: He appears well-developed and well-nourished. No distress.   HENT:   Head: Normocephalic and atraumatic.   Eyes: Right eye exhibits no discharge. Left eye exhibits no discharge.   Neck: No JVD present.   Cardiovascular: Normal rate, regular rhythm and normal heart sounds. Exam reveals no gallop and no friction rub.   No murmur heard.  Pulmonary/Chest: Effort normal and breath sounds normal.   Abdominal: Soft. Bowel sounds are normal.   Musculoskeletal: He exhibits tenderness and deformity (R BKA). He exhibits no edema.   Neurological: He is alert.   Skin: Skin is warm and dry. He is not diaphoretic.   Psychiatric: He has a normal mood and affect. His behavior is normal. Judgment and thought content normal.       Significant Labs:   BMP:   Recent Labs   Lab 02/20/20 2158   GLU 81      K 3.4*      CO2 19*   BUN 8   CREATININE 0.8   CALCIUM 9.7   , CMP   Recent Labs   Lab 02/20/20 2158      K 3.4*      CO2 19*   GLU 81   BUN 8   CREATININE 0.8   CALCIUM 9.7   PROT 8.2   ALBUMIN 4.4   BILITOT 0.5   ALKPHOS 104   AST 35   ALT 16   ANIONGAP 17*   ESTGFRAFRICA >60   EGFRNONAA >60   , CBC   Recent Labs   Lab 02/20/20 2158 02/21/20  0543   WBC 11.50 11.29   HGB 15.9 14.5   HCT 46.8 43.4    228   , INR   Recent Labs   Lab 02/20/20 2158   INR 1.0   , Lipid Panel No results for input(s): CHOL, HDL, LDLCALC, TRIG, CHOLHDL in the last 48 hours. and All pertinent lab results from the last 24 hours have been reviewed.    Significant Imaging: Echocardiogram: Transthoracic echo (TTE)  complete (Cupid Only): No results found for this or any previous visit.    Assessment and Plan:     * Critical lower limb ischemia  Patient presented with RLE BKA tender to touch and cool.   Knee and proximal portions of the leg were warm.    Skin has a red discoloration which appears to be evolving ecchymosis from his prosthesis which was placed on the swollen limb  RLE neurovascularly intact with appropriate ROM and sensation    Ultrasound RLE suggestive of DFA stenosis. Occlusion of distal  SFA and pop arteries     Treating with heparin gtt, aspirin 81 mg daily, clopidogrel 75 mg, Lipitor, and Lisinopril    Leg improved this AM with notation of warm stump and palpable pop pulse     Smoking cessation     Will discuss with Dr Vallecillo and update primary team on POC    Patient will eventually need to be transitioned to DOAC- most likely Xarelto for once daily dosing to enhance compliance      Essential hypertension  SBP 220s on admission and remains high in the 160s  Lisinopril and HCTZ resumed - will up titrate PRN  Hydralazine PRN         VTE Risk Mitigation (From admission, onward)         Ordered     IP VTE HIGH RISK PATIENT  Once      02/21/20 0233     Reason for no Mechanical VTE Prophylaxis  Once     Question:  Reasons:  Answer:  Physician Provided (leave comment)  Comment:  on heparin gtt     02/21/20 0233     heparin 25,000 units in dextrose 5% 250 mL (100 units/mL) infusion HIGH INTENSITY nomogram - [facility]  Continuous     Question:  Heparin Infusion Adjustment (DO NOT MODIFY ANSWER)  Answer:  \[facility]\epic\Images\Pharmacy\HeparinInfusions\heparin HIGH INTENSITY nomogram for [facility] [id number].pdf    02/20/20 5188     heparin 25,000 units in dextrose 5% (100 units/ml) IV bolus from bag - ADDITIONAL PRN BOLUS - 60 units/kg  As needed (PRN)     Question:  Heparin Infusion Adjustment (DO NOT MODIFY ANSWER)  Answer:  \[email or web address]\epic\Images\Pharmacy\HeparinInfusions\heparin HIGH INTENSITY nomogram for [facility] [id number].pdf     02/20/20 8586     heparin 25,000 units in dextrose 5% (100 units/ml) IV bolus from bag - ADDITIONAL PRN BOLUS - 30 units/kg  As needed (PRN)     Question:  Heparin Infusion Adjustment (DO NOT MODIFY ANSWER)  Answer:  [email or web address]\epic\Images\Pharmacy\HeparinInfusions\heparin HIGH INTENSITY nomogram for [facility] [id number].pdf    02/20/20 2256                Thank you for your consult. I will follow-up with patient. Please contact us if you have any additional questions.    Ag Eller, PRASANTH  Cardiology   [facility]

## 2020-02-21 NOTE — ASSESSMENT & PLAN NOTE
Patient presented with RLE BKA tender to touch and cool.   Knee and proximal portions of the leg were warm.   Skin has a red discoloration which appears to be evolving ecchymosis from his prosthesis which was placed on the swollen limb  RLE neurovascularly intact with appropriate ROM and sensation    Ultrasound RLE suggestive of DFA stenosis. Occlusion of distal  SFA and pop arteries     Treating with heparin gtt, aspirin 81 mg daily, clopidogrel 75 mg, Lipitor, and Lisinopril    Leg improved this AM with notation of warm stump and palpable pop pulse     Smoking cessation     Will discuss with Dr Vallecillo and update primary team on POC    Patient will eventually need to be transitioned to DOAC- most likely Xarelto for once daily dosing to enhance compliance

## 2020-02-21 NOTE — NURSING
Dr Russell called and notified of /105 HR 87. No prn meds ordered. Stated to monitor him. No orders given.

## 2020-02-21 NOTE — HOSPITAL COURSE
Cardiology recommended continuing the aspirin, clopidrogel, and heparin. He underwent right lower extremity peripheral angiogram on 2/24/20 finding a 99% stenotic lesion of the proximal right superficial femoral artery, 95% stenotic lesion of the mid to distal superficial femoral artery, a 100% stenotic lesion of the distal superficial femoral artery, and a 100% stenotic lesion of the proximal to mid popliteal artery. POD#1 Revascularization. Will d/c home with Plavix and Rivaroxaban. Will need to follow up with Dr. Good and his PCP.

## 2020-02-21 NOTE — H&P (VIEW-ONLY)
[facility]  Cardiology  Consult Note    Patient Name: Ren Grove  MRN: [id number]  Admission Date: 2/20/2020  Hospital Length of Stay: 0 days  Code Status: Full Code   Attending Provider: Andrés Fuentes MD   Consulting Provider: Ag Eller NP  Primary Care Physician: Nomi Bermeo MD  Principal Problem:Critical lower limb ischemia    Patient information was obtained from patient, past medical records and ER records.     Inpatient consult to Interventional Cardiology  Consult performed by: Ag Eller NP  Consult ordered by: Johan Marr MD        Subjective:     Chief Complaint:  RLE pain      HPI:   38 yo male h/o right BKA following DVT 2012, compartment syndrome left leg sp fasciotomy 2015, HTN, active tobacco user, and medical noncompliance. Patient presented for acute onset pain and swelling right leg stump, starting between noon and 2 pm 02/20/20. He reports the leg felt sore to him on 02/19/20. Patient then noted the leg was swollen, as the prosthesis was not fitting (usually slides right in).  Of note, he was recommended to come off his coumadin a few months ago, and was only on aspirin 81 mg daily. Patient was placed on a heparin gtt and loaded with 600 mg Plavix in the ED out of concern for acute limb ischemia. Patient was examined in the ED by Dr Garay and his right leg stump was tender to touch and cool. The knee and proximal portions of the leg were warm. The skin has a red discoloration, which clearly demarcates at the upper thigh level (appears to be from the prosthesis as was placed on the swollen limb). He was able to move the leg at the knee.     Ultrasound RLE suggestive of DFA stenosis. Occlusion of distal  SFA and pop arteries.      Findings were discussed with Dr Vallecillo who recommended heparin gtt, aspirin 81 mg daily, clopidogrel 75 mg daily and to re-evaluate in AM to determine revascularization plan.     Smoking cessation was also encouraged.      Past Medical History:   Diagnosis Date    Amputation of lower limb     left below the knee    Compartment syndrome of left lower extremity     Compartment syndrome of right lower extremity     Depression 3/18/2016    DVT (deep venous thrombosis)     Encounter for cholesteral screening for cardiovascular disease     Hypertension     Tachycardia        Past Surgical History:   Procedure Laterality Date    AMPUTATION, LOWER LIMB  2012    right BKA    KNEE SURGERY      TIBIAL FASCIOTOMY Right 12/08/2015       Review of patient's allergies indicates:  No Known Allergies    No current facility-administered medications on file prior to encounter.      Current Outpatient Medications on File Prior to Encounter   Medication Sig    ibuprofen (ADVIL,MOTRIN) 200 MG tablet Take 2 tablets (400 mg total) by mouth every 6 (six) hours as needed for Pain.    alprazolam (XANAX) 0.5 MG tablet Take 0.5 mg by mouth 3 (three) times daily as needed for Anxiety.     amlodipine (NORVASC) 10 MG tablet Take 1 tablet (10 mg total) by mouth once daily.    aspirin 81 MG Chew Take 1 tablet (81 mg total) by mouth once daily.    atorvastatin (LIPITOR) 40 MG tablet Take 40 mg by mouth once daily.    diclofenac (VOLTAREN) 50 MG EC tablet Take 1 tablet (50 mg total) by mouth 2 (two) times daily.    duloxetine (CYMBALTA) 60 MG capsule Take 60 mg by mouth once daily.    gabapentin (NEURONTIN) 300 MG capsule Take 2 capsules (600 mg total) by mouth 3 (three) times daily. Take one tablet by mouth 3 to 4 times daily    hydrocodone-acetaminophen 5-325mg (NORCO) 5-325 mg per tablet Take 1 tablet by mouth every 8 (eight) hours as needed.    INV LISINOPRIL-HCTZ 20-12.5 Take 1 tablet by mouth once daily. For investigational use only.    lidocaine (LIDODERM) 5 % Place 1 patch onto the skin every 24 hours as needed. Remove & Discard patch within 12 hours or as directed by MD    naproxen (NAPROSYN) 500 MG tablet Take 500 mg by mouth 2 (two)  times daily.    traMADol (ULTRAM) 50 mg tablet Take 1 tablet (50 mg total) by mouth every 6 (six) hours as needed for Pain.    warfarin (COUMADIN) 5 MG tablet Take 2.5 tablets (12.5 mg total) by mouth Daily. Or as directed by Coumadin Clinic     Family History     Problem Relation (Age of Onset)    Heart disease Father    Hypertension Mother        Tobacco Use    Smoking status: Current Every Day Smoker     Packs/day: 1.00     Types: Cigarettes    Smokeless tobacco: Never Used   Substance and Sexual Activity    Alcohol use: Yes     Comment: occasional use    Drug use: No    Sexual activity: Yes     Partners: Female     Review of Systems   Constitution: Negative for diaphoresis and fever.   HENT: Negative.    Eyes: Negative.    Cardiovascular: Positive for leg swelling. Negative for chest pain, dyspnea on exertion, irregular heartbeat, near-syncope, orthopnea, palpitations, paroxysmal nocturnal dyspnea and syncope.   Respiratory: Negative for cough, shortness of breath, sputum production and wheezing.    Endocrine: Negative.    Hematologic/Lymphatic: Negative for bleeding problem. Does not bruise/bleed easily.   Skin: Positive for color change.   Musculoskeletal: Positive for myalgias.   Gastrointestinal: Negative.  Negative for hematemesis, hematochezia, melena, nausea and vomiting.   Genitourinary: Negative.    Neurological: Negative.  Negative for dizziness and light-headedness.   Psychiatric/Behavioral: Negative.    Allergic/Immunologic: Negative.      Objective:     Vital Signs (Most Recent):  Temp: 96.7 °F (35.9 °C) (02/21/20 0604)  Pulse: 86 (02/21/20 0744)  Resp: 18 (02/21/20 0604)  BP: (!) 161/105(dr foss notified) (02/21/20 0604)  SpO2: (!) 90 % (02/21/20 0604) Vital Signs (24h Range):  Temp:  [96.7 °F (35.9 °C)-98.7 °F (37.1 °C)] 96.7 °F (35.9 °C)  Pulse:  [] 86  Resp:  [15-23] 18  SpO2:  [90 %-99 %] 90 %  BP: (161-227)/() 161/105     Weight: 95.3 kg (210 lb)  Body mass index is 31.01  kg/m².    SpO2: (!) 90 %  O2 Device (Oxygen Therapy): room air    No intake or output data in the 24 hours ending 02/21/20 0926    Lines/Drains/Airways     Peripheral Intravenous Line                 Peripheral IV - Single Lumen 02/20/20 2157 18 G Right;Proximal Forearm less than 1 day         Peripheral IV - Single Lumen 02/21/20 0020 18 G Left Antecubital less than 1 day                Physical Exam   Constitutional: He appears well-developed and well-nourished. No distress.   HENT:   Head: Normocephalic and atraumatic.   Eyes: Right eye exhibits no discharge. Left eye exhibits no discharge.   Neck: No JVD present.   Cardiovascular: Normal rate, regular rhythm and normal heart sounds. Exam reveals no gallop and no friction rub.   No murmur heard.  Pulmonary/Chest: Effort normal and breath sounds normal.   Abdominal: Soft. Bowel sounds are normal.   Musculoskeletal: He exhibits tenderness and deformity (R BKA). He exhibits no edema.   Neurological: He is alert.   Skin: Skin is warm and dry. He is not diaphoretic.   Psychiatric: He has a normal mood and affect. His behavior is normal. Judgment and thought content normal.       Significant Labs:   BMP:   Recent Labs   Lab 02/20/20 2158   GLU 81      K 3.4*      CO2 19*   BUN 8   CREATININE 0.8   CALCIUM 9.7   , CMP   Recent Labs   Lab 02/20/20 2158      K 3.4*      CO2 19*   GLU 81   BUN 8   CREATININE 0.8   CALCIUM 9.7   PROT 8.2   ALBUMIN 4.4   BILITOT 0.5   ALKPHOS 104   AST 35   ALT 16   ANIONGAP 17*   ESTGFRAFRICA >60   EGFRNONAA >60   , CBC   Recent Labs   Lab 02/20/20 2158 02/21/20  0543   WBC 11.50 11.29   HGB 15.9 14.5   HCT 46.8 43.4    228   , INR   Recent Labs   Lab 02/20/20 2158   INR 1.0   , Lipid Panel No results for input(s): CHOL, HDL, LDLCALC, TRIG, CHOLHDL in the last 48 hours. and All pertinent lab results from the last 24 hours have been reviewed.    Significant Imaging: Echocardiogram: Transthoracic echo (TTE)  complete (Cupid Only): No results found for this or any previous visit.    Assessment and Plan:     * Critical lower limb ischemia  Patient presented with RLE BKA tender to touch and cool.   Knee and proximal portions of the leg were warm.    Skin has a red discoloration which appears to be evolving ecchymosis from his prosthesis which was placed on the swollen limb  RLE neurovascularly intact with appropriate ROM and sensation    Ultrasound RLE suggestive of DFA stenosis. Occlusion of distal  SFA and pop arteries     Treating with heparin gtt, aspirin 81 mg daily, clopidogrel 75 mg, Lipitor, and Lisinopril    Leg improved this AM with notation of warm stump and palpable pop pulse     Smoking cessation     Will discuss with Dr Vallecillo and update primary team on POC    Patient will eventually need to be transitioned to DOAC- most likely Xarelto for once daily dosing to enhance compliance      Essential hypertension  SBP 220s on admission and remains high in the 160s  Lisinopril and HCTZ resumed - will up titrate PRN  Hydralazine PRN         VTE Risk Mitigation (From admission, onward)         Ordered     IP VTE HIGH RISK PATIENT  Once      02/21/20 0233     Reason for no Mechanical VTE Prophylaxis  Once     Question:  Reasons:  Answer:  Physician Provided (leave comment)  Comment:  on heparin gtt     02/21/20 0233     heparin 25,000 units in dextrose 5% 250 mL (100 units/mL) infusion HIGH INTENSITY nomogram - [facility]  Continuous     Question:  Heparin Infusion Adjustment (DO NOT MODIFY ANSWER)  Answer:  \[email or web address]\epic\Images\Pharmacy\HeparinInfusions\heparin HIGH INTENSITY nomogram for [facility] [id number].pdf    02/20/20 0527     heparin 25,000 units in dextrose 5% (100 units/ml) IV bolus from bag - ADDITIONAL PRN BOLUS - 60 units/kg  As needed (PRN)     Question:  Heparin Infusion Adjustment (DO NOT MODIFY ANSWER)  Answer:  \[email or web address]\epic\Images\Pharmacy\HeparinInfusions\heparin HIGH INTENSITY nomogram for [facility] [id number].pdf     02/20/20 4126     heparin 25,000 units in dextrose 5% (100 units/ml) IV bolus from bag - ADDITIONAL PRN BOLUS - 30 units/kg  As needed (PRN)     Question:  Heparin Infusion Adjustment (DO NOT MODIFY ANSWER)  Answer:  [email or web address]\epic\Images\Pharmacy\HeparinInfusions\heparin HIGH INTENSITY nomogram for [facility] [id number].pdf    02/20/20 2256                Thank you for your consult. I will follow-up with patient. Please contact us if you have any additional questions.    Ag Eller, PRASANTH  Cardiology   [facility]

## 2020-02-21 NOTE — H&P
"[facility] Medicine  History & Physical    Patient Name: Ren Grove  MRN: [id number]  Admission Date: 2/20/2020  Attending Physician: Iglesia Gonzales DO   Primary Care Provider: Nomi Bermeo MD         Patient information was obtained from patient, past medical records and ER records.     Subjective:     Principal Problem:Critical lower limb ischemia    Chief Complaint:   Chief Complaint   Patient presents with    Leg Pain     Patient presents to the ED with reports of having right leg pain, Hx of right BKA and hx of blood clots.  Right leg appears bruised and feels cold to touch.         HPI: Ren Grove is a 38 yo male with pmh of hypertension, hyperlipidemia, peripheral vascular disease, active tobacco abuse, compartment syndrome left leg sp fasciotomy 2015, right  BKA following DVT 2012, anxiety, depression, chronic anticoagulation and noncompliance with medication regimen.  He presented to [facility] ED 2/20/20 with acute onset of pain, swelling, and redness to right leg stump. Onset between noon and 2 pm. He reports the right leg felt sore to him yesterday. Today he noted the leg was swollen, as the prosthesis was not fitting (usually slides right in). He reports pain has gradually become worse with noted dusky appearance to extremity and cool to touch. There was also some concern with LLE swelling, however it has resolved. Patient has a prior history of similar symptoms, and states he stopped taking his blood thinners several months ago because "I did not like them." Ultrasound RLE suggestive of DFA stenosis. Occlusion of distal  SFA and pop arteries. Interventional cardiology consulted with recommendation for plavix load, ASA and heparin gtt with plan to re-evaluate in AM to determine revascularization plan. Patient admitted to [facility] medicine.        Past Medical History:   Diagnosis Date    Amputation of lower limb     left below the knee    Compartment " syndrome of left lower extremity     Compartment syndrome of right lower extremity     Depression 3/18/2016    DVT (deep venous thrombosis)     Encounter for cholesteral screening for cardiovascular disease     Hypertension     Tachycardia        Past Surgical History:   Procedure Laterality Date    AMPUTATION, LOWER LIMB  2012    right BKA    KNEE SURGERY      TIBIAL FASCIOTOMY Right 12/08/2015       Review of patient's allergies indicates:  No Known Allergies    No current facility-administered medications on file prior to encounter.      Current Outpatient Medications on File Prior to Encounter   Medication Sig    ibuprofen (ADVIL,MOTRIN) 200 MG tablet Take 2 tablets (400 mg total) by mouth every 6 (six) hours as needed for Pain.    alprazolam (XANAX) 0.5 MG tablet Take 0.5 mg by mouth 3 (three) times daily as needed for Anxiety.     amlodipine (NORVASC) 10 MG tablet Take 1 tablet (10 mg total) by mouth once daily.    aspirin 81 MG Chew Take 1 tablet (81 mg total) by mouth once daily.    atorvastatin (LIPITOR) 40 MG tablet Take 40 mg by mouth once daily.    diclofenac (VOLTAREN) 50 MG EC tablet Take 1 tablet (50 mg total) by mouth 2 (two) times daily.    duloxetine (CYMBALTA) 60 MG capsule Take 60 mg by mouth once daily.    gabapentin (NEURONTIN) 300 MG capsule Take 2 capsules (600 mg total) by mouth 3 (three) times daily. Take one tablet by mouth 3 to 4 times daily    hydrocodone-acetaminophen 5-325mg (NORCO) 5-325 mg per tablet Take 1 tablet by mouth every 8 (eight) hours as needed.    INV LISINOPRIL-HCTZ 20-12.5 Take 1 tablet by mouth once daily. For investigational use only.    lidocaine (LIDODERM) 5 % Place 1 patch onto the skin every 24 hours as needed. Remove & Discard patch within 12 hours or as directed by MD    naproxen (NAPROSYN) 500 MG tablet Take 500 mg by mouth 2 (two) times daily.    traMADol (ULTRAM) 50 mg tablet Take 1 tablet (50 mg total) by mouth every 6 (six) hours as  needed for Pain.    warfarin (COUMADIN) 5 MG tablet Take 2.5 tablets (12.5 mg total) by mouth Daily. Or as directed by [facility]     Family History     Problem Relation (Age of Onset)    Heart disease Father    Hypertension Mother        Tobacco Use    Smoking status: Current Every Day Smoker     Packs/day: 1.00     Types: Cigarettes    Smokeless tobacco: Never Used   Substance and Sexual Activity    Alcohol use: Yes     Comment: occasional use    Drug use: No    Sexual activity: Yes     Partners: Female     Review of Systems   Constitutional: Negative for chills and fever.   Eyes: Negative for photophobia.   Respiratory: Negative for cough, chest tightness, shortness of breath and wheezing.    Cardiovascular: Negative for chest pain, palpitations and leg swelling.   Gastrointestinal: Negative for abdominal pain, diarrhea, nausea and vomiting.   Genitourinary: Negative for dysuria, flank pain and hematuria.   Musculoskeletal: Positive for arthralgias. Negative for back pain, myalgias and neck pain.   Skin: Positive for color change (right leg ). Negative for rash and wound.   Neurological: Negative for dizziness, syncope and headaches.   Psychiatric/Behavioral: Negative for agitation.     Objective:     Vital Signs (Most Recent):  Temp: 97.7 °F (36.5 °C) (02/21/20 0243)  Pulse: 93 (02/21/20 0403)  Resp: 18 (02/21/20 0243)  BP: (!) 169/89(dr foss notified see NN) (02/21/20 0243)  SpO2: 99 % (02/21/20 0243) Vital Signs (24h Range):  Temp:  [97.7 °F (36.5 °C)-98.7 °F (37.1 °C)] 97.7 °F (36.5 °C)  Pulse:  [] 93  Resp:  [15-23] 18  SpO2:  [95 %-99 %] 99 %  BP: (169-227)/() 169/89     Weight: 95.3 kg (210 lb)  Body mass index is 31.01 kg/m².    Physical Exam   Constitutional: He is oriented to person, place, and time. He appears well-developed and well-nourished. No distress.   HENT:   Head: Normocephalic and atraumatic.   Eyes: Pupils are equal, round, and reactive to light. Conjunctivae are  normal.   Neck: Normal range of motion. Neck supple. No JVD present.   Cardiovascular: Normal rate, regular rhythm, normal heart sounds and intact distal pulses.   Well demarcated skin changes to the RLE in the proximal thigh, consisting of dusky appearance that worsens as you go distally to his BKA stump.  RLE is cold to touch    Pulmonary/Chest: Effort normal and breath sounds normal. No respiratory distress. He has no wheezes.   Abdominal: Soft. Bowel sounds are normal. He exhibits no distension. There is no tenderness. There is no guarding.   Musculoskeletal: He exhibits no edema or tenderness.   Neurological: He is alert and oriented to person, place, and time.   Skin: Skin is warm and dry. Capillary refill takes less than 2 seconds.   Psychiatric: He has a normal mood and affect. His behavior is normal.         CRANIAL NERVES     CN III, IV, VI   Pupils are equal, round, and reactive to light.       Significant Labs:   BMP:   Recent Labs   Lab 02/20/20  2158   GLU 81      K 3.4*      CO2 19*   BUN 8   CREATININE 0.8   CALCIUM 9.7     CBC:   Recent Labs   Lab 02/20/20  2158   WBC 11.50   HGB 15.9   HCT 46.8          Significant Imaging: I have reviewed all pertinent imaging results/findings within the past 24 hours.    Assessment/Plan:     * Critical lower limb ischemia  Pain in right lower leg    Cardiology following. Continue ASA, plavix and  Heparin gtt. Keep NPO. Plan to re-evaluate to determine revascularization plan in am      Warfarin anticoagulation  INR 1.0. Patient noncompliant, has not taken in 1 month       Essential hypertension  Hyperlipidemia     Goal -160. Patient noncompliant with home ASA, amlodipine, lisinopril-hctz and atorvastatin (has not taken in 1 month)          VTE Risk Mitigation (From admission, onward)         Ordered     IP VTE HIGH RISK PATIENT  Once      02/21/20 0233     Reason for no Mechanical VTE Prophylaxis  Once     Question:  Reasons:  Answer:   Physician Provided (leave comment)  Comment:  on heparin gtt     02/21/20 0233     heparin 25,000 units in dextrose 5% 250 mL (100 units/mL) infusion HIGH INTENSITY nomogram - [facility]  Continuous     Question:  Heparin Infusion Adjustment (DO NOT MODIFY ANSWER)  Answer:  [email or web address]\epic\Images\Pharmacy\HeparinInfusions\heparin HIGH INTENSITY nomogram for [facility] [id number]    02/20/20 2256     heparin 25,000 units in dextrose 5% (100 units/ml) IV bolus from bag - ADDITIONAL PRN BOLUS - 60 units/kg  As needed (PRN)     Question:  Heparin Infusion Adjustment (DO NOT MODIFY ANSWER)  Answer:  [email or web address]\epic\Images\Pharmacy\HeparinInfusions\heparin HIGH INTENSITY nomogram for [facility] [id number].pdf    02/20/20 2256     heparin 25,000 units in dextrose 5% (100 units/ml) IV bolus from bag - ADDITIONAL PRN BOLUS - 30 units/kg  As needed (PRN)     Question:  Heparin Infusion Adjustment (DO NOT MODIFY ANSWER)  Answer:  [email or web address]\epic\Images\Pharmacy\HeparinInfusions\heparin HIGH INTENSITY nomogram for [facility] [id number]    02/20/20 2256                   Fartun Hernandez NP  Department of Hospital Medicine   [facility]

## 2020-02-21 NOTE — ED PROVIDER NOTES
"Encounter Date: 2/20/2020    SCRIBE #1 NOTE: I, Lan Piña, am scribing for, and in the presence of,  Dr. Marr. I have scribed the entire note.       History     Chief Complaint   Patient presents with    Leg Pain     Patient presents to the ED with reports of having right leg pain, Hx of right BKA and hx of blood clots.  Right leg appears bruised and feels cold to touch.      This is a 39 y.o. male who has a past medical history of Amputation of lower limb, Compartment syndrome of left lower extremity, Compartment syndrome of right lower extremity, Depression (3/18/2016), DVT (deep venous thrombosis), Encounter for cholesteral screening for cardiovascular disease, Hypertension, and Tachycardia.     The patient has a history of DVT of right leg and BKA of right leg presents to the ER with right leg pain, swelling, and redness beginning today.  He states that he first noticed difficulty putting his prosthetic on this morning due to swelling, noting some discoloration to the limb.  He has had worsening pain to the right leg with bruising and redness since 1000, now cold to the touch with a dusky appearance.  Pt does not report any other symptoms.  Patient has a prior history of similar symptoms, and states he stopped taking his blood thinners several months ago because "I did not like them."        The history is provided by the patient and the spouse.     Review of patient's allergies indicates:  No Known Allergies  Past Medical History:   Diagnosis Date    Amputation of lower limb     left below the knee    Compartment syndrome of left lower extremity     Compartment syndrome of right lower extremity     Depression 3/18/2016    DVT (deep venous thrombosis)     Encounter for cholesteral screening for cardiovascular disease     Hypertension     Tachycardia      Past Surgical History:   Procedure Laterality Date    AMPUTATION, LOWER LIMB  2012    right BKA    KNEE SURGERY      TIBIAL FASCIOTOMY Right " 12/08/2015     No family history on file.  Social History     Tobacco Use    Smoking status: Current Every Day Smoker     Packs/day: 1.00     Types: Cigarettes    Smokeless tobacco: Never Used   Substance Use Topics    Alcohol use: Yes     Comment: occas    Drug use: No     Review of Systems   Constitutional: Negative for chills and fever.   HENT: Negative for sore throat.    Eyes: Negative for redness.   Respiratory: Negative for shortness of breath.    Cardiovascular: Positive for leg swelling. Negative for chest pain.   Gastrointestinal: Negative for abdominal pain, diarrhea, nausea and vomiting.   Genitourinary: Negative for dysuria and hematuria.   Musculoskeletal: Negative for back pain.        Right leg pain   Skin: Positive for color change. Negative for rash.   Neurological: Negative for headaches.       Physical Exam     Initial Vitals [02/20/20 2131]   BP Pulse Resp Temp SpO2   (!) 227/130 (!) 116 20 98.7 °F (37.1 °C) 97 %      MAP       --         Physical Exam    Nursing note and vitals reviewed.  Constitutional: He appears well-developed and well-nourished. He is not diaphoretic. No distress.   HENT:   Head: Normocephalic and atraumatic.   Mouth/Throat: Oropharynx is clear and moist.   Eyes: EOM are normal. Pupils are equal, round, and reactive to light.   Neck: Normal range of motion. Neck supple.   Cardiovascular: Regular rhythm, normal heart sounds and intact distal pulses.   Pulses:       Femoral pulses are 1+ on the right side, and 2+ on the left side.       Popliteal pulses are 0 on the right side.   tachycardia   Pulmonary/Chest: Breath sounds normal. No respiratory distress. He has no wheezes. He has no rhonchi. He has no rales.   Abdominal: Soft. Bowel sounds are normal. He exhibits no distension. There is no tenderness.   Musculoskeletal: Normal range of motion. He exhibits no edema or tenderness.   Lymphadenopathy:     He has no cervical adenopathy.   Neurological: He is alert and  oriented to person, place, and time. He has normal strength. GCS score is 15. GCS eye subscore is 4. GCS verbal subscore is 5. GCS motor subscore is 6.   Skin: Skin is dry.   Well demarcated skin changes to the RLE in the proximal thigh, consisting of dusky appearance that worsens as you go distally to his BKA stump.  RLE is cold to touch   Psychiatric: He has a normal mood and affect. Thought content normal.                           ED Course   Critical Care  Date/Time: 2/20/2020 10:58 PM  Performed by: Johan Marr MD  Authorized by: Johan Marr MD   Total critical care time (exclusive of procedural time) : 0 minutes  Comments: Critical Care time: 40 minutes inclusive of direct patient care, review of previous records, interpretation of labs, imaging and ekg, as well as discussion of my impression and plan of care with the patient, family and other clinicians/consultants. This time is exclusive of any separate billable procedures and of treating other patients. Critical care was required for this patient who presented with limb ischemia requiring my emergent evaluation and management in the emergency department.         Labs Reviewed   COMPREHENSIVE METABOLIC PANEL - Abnormal; Notable for the following components:       Result Value    Potassium 3.4 (*)     CO2 19 (*)     Anion Gap 17 (*)     All other components within normal limits   APTT - Abnormal; Notable for the following components:    aPTT 36.6 (*)     All other components within normal limits   CBC W/ AUTO DIFFERENTIAL   APTT   PROTIME-INR   PROTIME-INR   CBC W/ AUTO DIFFERENTIAL          Imaging Results           US Lower Extremity Arteries Right (Final result)  Result time 02/21/20 00:05:00    Final result by Yarely Ramos MD (02/21/20 00:05:00)                 Impression:      Elevated velocity in the right DFA, which may suggest stenosis.    Occlusion of the distal SFA and popliteal arteries.    This report was flagged in Epic as  abnormal.      Electronically signed by: Yarely Ramos  Date:    02/21/2020  Time:    00:05             Narrative:    EXAMINATION:  US LOWER EXTREMITY ARTERIES RIGHT    CLINICAL HISTORY:  Other disorder of circulatory system    TECHNIQUE:  Duplex and color flow Doppler evaluation of the right lower extremity arteries was performed.    COMPARISON:  02/04/2019    FINDINGS:  No FIDENCIO was performed.  A right below-knee amputation is noted.    Right lower extremity arterial peak systolic velocities in cm/sec:    CFA: 106, triphasic    DFA: 235/73, triphasic    Proximal SFA: 94.6, triphasic    Mid SFA: 91.8, biphasic    Distal SFA: Occlusive at the knee    Proximal pop: Occlusive collateral seen    Distal pop: Occlusive collateral flow seen                                 Medical Decision Making:   Initial Assessment:   Patient presents with right leg pain, red/dusky, and swelling with history of DVT and BKA. Suspect acute limb ischemia. Will contact interventional cardiology.  Clinical Tests:   Lab Tests: Ordered and Reviewed                   ED Course as of Feb 21 0125   Thu Feb 20, 2020 2158 Discussed with Dr. Vallecillo, cardiology, who will call back after reviewing the chart.    [EW]   2258 Dr. Vallecillo reviewed the chart and agrees with Heparin bolus; will start drip. Also will give full strength Aspirin and Plavix 600 mg.    [EW]   Fri Feb 21, 2020   0002 Spoke with Dr. Russell, cardiology, who will come see the patient. Will admit to medicine.    [EW]   0045 Dr. Russell in the ED, case discussed with Dr. Braswell. Will continue current therapy with heparin drip and re-eval patient in the AM.    [NP]   0050 [facility], who will admit to tele under their service.    [NP]      ED Course User Index  [EW] Lan Piña  [NP] Johan Marr MD                Clinical Impression:       ICD-10-CM ICD-9-CM   1. Arterial occlusion, lower extremity I70.209 444.22   2. Limb ischemia I99.8 459.9        Disposition:   Disposition: Admitted  Condition: Serious      I, Dr. Johan Marr, personally performed the services described in this documentation. All medical record entries made by the scribe were at my direction and in my presence. I have reviewed the chart and agree that the record is accurate and complete.   Johan Marr MD.       Johan Marr MD  02/21/20 0126

## 2020-02-21 NOTE — PROGRESS NOTES
Pharmacy New Medication Education    Patient and/or Caregiver ACCEPTED medication education.    Pharmacy has provided education on the name, indication, and possible side effects of the medication(s) prescribed, using teach-back method.     Learners of pharmacy medication education includes:  patient    The following medications have also been discussed, during this admission.     Current Facility-Administered Medications   Medication Frequency    acetaminophen tablet 650 mg Q4H PRN    amLODIPine tablet 10 mg Daily    aspirin EC tablet 81 mg Daily    atorvastatin tablet 40 mg Daily    clopidogreL tablet 75 mg Daily    heparin 25,000 units in dextrose 5% (100 units/ml) IV bolus from bag - ADDITIONAL PRN BOLUS - 30 units/kg PRN    heparin 25,000 units in dextrose 5% (100 units/ml) IV bolus from bag - ADDITIONAL PRN BOLUS - 60 units/kg PRN    heparin 25,000 units in dextrose 5% 250 mL (100 units/mL) infusion HIGH INTENSITY nomogram - [facility] Continuous    hydrALAZINE injection 10 mg Q6H PRN    hydroCHLOROthiazide tablet 12.5 mg Daily    lisinopril tablet 20 mg Daily    morphine injection 4 mg Q4H PRN    ondansetron injection 4 mg Q8H PRN    sodium chloride 0.9% flush 10 mL PRN          Thank you  Javid Caldwell, PharmD

## 2020-02-21 NOTE — SUBJECTIVE & OBJECTIVE
Past Medical History:   Diagnosis Date    Amputation of lower limb     left below the knee    Compartment syndrome of left lower extremity     Compartment syndrome of right lower extremity     Depression 3/18/2016    DVT (deep venous thrombosis)     Encounter for cholesteral screening for cardiovascular disease     Hypertension     Tachycardia        Past Surgical History:   Procedure Laterality Date    AMPUTATION, LOWER LIMB  2012    right BKA    KNEE SURGERY      TIBIAL FASCIOTOMY Right 12/08/2015       Review of patient's allergies indicates:  No Known Allergies    No current facility-administered medications on file prior to encounter.      Current Outpatient Medications on File Prior to Encounter   Medication Sig    ibuprofen (ADVIL,MOTRIN) 200 MG tablet Take 2 tablets (400 mg total) by mouth every 6 (six) hours as needed for Pain.    alprazolam (XANAX) 0.5 MG tablet Take 0.5 mg by mouth 3 (three) times daily as needed for Anxiety.     amlodipine (NORVASC) 10 MG tablet Take 1 tablet (10 mg total) by mouth once daily.    aspirin 81 MG Chew Take 1 tablet (81 mg total) by mouth once daily.    atorvastatin (LIPITOR) 40 MG tablet Take 40 mg by mouth once daily.    diclofenac (VOLTAREN) 50 MG EC tablet Take 1 tablet (50 mg total) by mouth 2 (two) times daily.    duloxetine (CYMBALTA) 60 MG capsule Take 60 mg by mouth once daily.    gabapentin (NEURONTIN) 300 MG capsule Take 2 capsules (600 mg total) by mouth 3 (three) times daily. Take one tablet by mouth 3 to 4 times daily    hydrocodone-acetaminophen 5-325mg (NORCO) 5-325 mg per tablet Take 1 tablet by mouth every 8 (eight) hours as needed.    INV LISINOPRIL-HCTZ 20-12.5 Take 1 tablet by mouth once daily. For investigational use only.    lidocaine (LIDODERM) 5 % Place 1 patch onto the skin every 24 hours as needed. Remove & Discard patch within 12 hours or as directed by MD    naproxen (NAPROSYN) 500 MG tablet Take 500 mg by mouth 2 (two)  times daily.    traMADol (ULTRAM) 50 mg tablet Take 1 tablet (50 mg total) by mouth every 6 (six) hours as needed for Pain.    warfarin (COUMADIN) 5 MG tablet Take 2.5 tablets (12.5 mg total) by mouth Daily. Or as directed by [facility]     Family History     Problem Relation (Age of Onset)    Heart disease Father    Hypertension Mother        Tobacco Use    Smoking status: Current Every Day Smoker     Packs/day: 1.00     Types: Cigarettes    Smokeless tobacco: Never Used   Substance and Sexual Activity    Alcohol use: Yes     Comment: occasional use    Drug use: No    Sexual activity: Yes     Partners: Female     Review of Systems   Constitution: Negative for diaphoresis and fever.   HENT: Negative.    Eyes: Negative.    Cardiovascular: Positive for leg swelling. Negative for chest pain, dyspnea on exertion, irregular heartbeat, near-syncope, orthopnea, palpitations, paroxysmal nocturnal dyspnea and syncope.   Respiratory: Negative for cough, shortness of breath, sputum production and wheezing.    Endocrine: Negative.    Hematologic/Lymphatic: Negative for bleeding problem. Does not bruise/bleed easily.   Skin: Positive for color change.   Musculoskeletal: Positive for myalgias.   Gastrointestinal: Negative.  Negative for hematemesis, hematochezia, melena, nausea and vomiting.   Genitourinary: Negative.    Neurological: Negative.  Negative for dizziness and light-headedness.   Psychiatric/Behavioral: Negative.    Allergic/Immunologic: Negative.      Objective:     Vital Signs (Most Recent):  Temp: 96.7 °F (35.9 °C) (02/21/20 0604)  Pulse: 86 (02/21/20 0744)  Resp: 18 (02/21/20 0604)  BP: (!) 161/105(dr foss notified) (02/21/20 0604)  SpO2: (!) 90 % (02/21/20 0604) Vital Signs (24h Range):  Temp:  [96.7 °F (35.9 °C)-98.7 °F (37.1 °C)] 96.7 °F (35.9 °C)  Pulse:  [] 86  Resp:  [15-23] 18  SpO2:  [90 %-99 %] 90 %  BP: (161-227)/() 161/105     Weight: 95.3 kg (210 lb)  Body mass index is 31.01  kg/m².    SpO2: (!) 90 %  O2 Device (Oxygen Therapy): room air    No intake or output data in the 24 hours ending 02/21/20 0926    Lines/Drains/Airways     Peripheral Intravenous Line                 Peripheral IV - Single Lumen 02/20/20 2157 18 G Right;Proximal Forearm less than 1 day         Peripheral IV - Single Lumen 02/21/20 0020 18 G Left Antecubital less than 1 day                Physical Exam   Constitutional: He appears well-developed and well-nourished. No distress.   HENT:   Head: Normocephalic and atraumatic.   Eyes: Right eye exhibits no discharge. Left eye exhibits no discharge.   Neck: No JVD present.   Cardiovascular: Normal rate, regular rhythm and normal heart sounds. Exam reveals no gallop and no friction rub.   No murmur heard.  Pulmonary/Chest: Effort normal and breath sounds normal.   Abdominal: Soft. Bowel sounds are normal.   Musculoskeletal: He exhibits tenderness and deformity (R BKA). He exhibits no edema.   Neurological: He is alert.   Skin: Skin is warm and dry. He is not diaphoretic.   Psychiatric: He has a normal mood and affect. His behavior is normal. Judgment and thought content normal.       Significant Labs:   BMP:   Recent Labs   Lab 02/20/20 2158   GLU 81      K 3.4*      CO2 19*   BUN 8   CREATININE 0.8   CALCIUM 9.7   , CMP   Recent Labs   Lab 02/20/20 2158      K 3.4*      CO2 19*   GLU 81   BUN 8   CREATININE 0.8   CALCIUM 9.7   PROT 8.2   ALBUMIN 4.4   BILITOT 0.5   ALKPHOS 104   AST 35   ALT 16   ANIONGAP 17*   ESTGFRAFRICA >60   EGFRNONAA >60   , CBC   Recent Labs   Lab 02/20/20 2158 02/21/20  0543   WBC 11.50 11.29   HGB 15.9 14.5   HCT 46.8 43.4    228   , INR   Recent Labs   Lab 02/20/20 2158   INR 1.0   , Lipid Panel No results for input(s): CHOL, HDL, LDLCALC, TRIG, CHOLHDL in the last 48 hours. and All pertinent lab results from the last 24 hours have been reviewed.    Significant Imaging: Echocardiogram: Transthoracic echo (TTE)  complete (Cupid Only): No results found for this or any previous visit.

## 2020-02-21 NOTE — ASSESSMENT & PLAN NOTE
Pain in right lower leg    Cardiology following. Continue ASA, plavix and  Heparin gtt. Keep NPO. Plan to re-evaluate to determine revascularization plan in am

## 2020-02-21 NOTE — ASSESSMENT & PLAN NOTE
SBP 220s on admission and remains high in the 160s  Lisinopril and HCTZ resumed - will up titrate PRN  Hydralazine PRN

## 2020-02-21 NOTE — NURSING
Spoke to Dr Russell. Informed MD of BP now 169/89 . New order to give 1 time ordered dose 0500 hydrochlorothiazide 12.5mg PO dose now.

## 2020-02-21 NOTE — SUBJECTIVE & OBJECTIVE
Interval History: Seen by Cardiology.    Review of Systems   Cardiovascular: Positive for leg swelling. Negative for chest pain.   Skin: Positive for color change. Negative for wound.     Objective:     Vital Signs (Most Recent):  Temp: 96.5 °F (35.8 °C) (02/21/20 1300)  Pulse: 85 (02/21/20 1500)  Resp: 18 (02/21/20 0604)  BP: (!) 168/104 (02/21/20 1500)  SpO2: 95 % (02/21/20 1300) Vital Signs (24h Range):  Temp:  [96.5 °F (35.8 °C)-98.7 °F (37.1 °C)] 96.5 °F (35.8 °C)  Pulse:  [] 85  Resp:  [15-23] 18  SpO2:  [90 %-99 %] 95 %  BP: (161-227)/() 168/104     Weight: 94.9 kg (209 lb 3.5 oz)  Body mass index is 30.9 kg/m².  No intake or output data in the 24 hours ending 02/21/20 1505   Physical Exam   Constitutional: He appears well-developed. No distress.   Pulmonary/Chest: Effort normal. No respiratory distress.   Neurological: He is alert.   Psychiatric: He has a normal mood and affect.   Nursing note and vitals reviewed.      Significant Labs: All pertinent labs within the past 24 hours have been reviewed.    Significant Imaging: I have reviewed all pertinent imaging results/findings within the past 24 hours.   US Lower Extremity Arteries Right 2/21/20: FINDINGS:  No FIDENCIO was performed.  A right below-knee amputation is noted.  Right lower extremity arterial peak systolic velocities in cm/sec:  CFA: 106, triphasic  DFA: 235/73, triphasic  Proximal SFA: 94.6, triphasic  Mid SFA: 91.8, biphasic  Distal SFA: Occlusive at the knee  Proximal pop: Occlusive collateral seen  Distal pop: Occlusive collateral flow seen  Impression: Elevated velocity in the right DFA, which may suggest stenosis.  Occlusion of the distal SFA and popliteal arteries.

## 2020-02-21 NOTE — HPI
"Ren Grove is a 39 y.o. 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 He lives in [place], [place]. His primary care physician is Dr. Nomi Bermeo.   He had been hospitalized at [facility] a year prior (24/19 to 2/15/19) for right popliteal artery stenosis and was prescribed warfarin.    He presented to [facility] Emergency [facility] on 2/20/20 with acute pain, swelling, and redness of his right leg stump. His prosthesis was not fitting correctly. He noted a dusky appearance and coolness to touch. He reported that he had stopped taking warfarin, saying "I did not like them" and that his primary doctor and the person who made his prosthetic thought that aspirin was probably enough. Ultrasound suggested deep femoral artery stenosis and occlusion of the distal superficial femoral artery and popliteal artery. Interventional Cardiology was contacted and recommended loading clopidrogel, giving aspirin and heparin infusion. He was admitted to [facility].  "

## 2020-02-21 NOTE — PROGRESS NOTES
"[facility]  Progress Note    Patient Name: Ren Grove  MRN: [id number]  Patient Class: IP- Inpatient   Admission Date: 2/20/2020  Length of Stay: 0 days  Attending Physician: Andrés Fuentes MD  Primary Care Provider: Nomi Bermeo MD        Subjective:     Principal Problem:Critical lower limb ischemia        HPI:  Ren Grove is a 39 y.o. 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 He lives in [place], [place]. His primary care physician is Dr. Nomi Bermeo.   He had been hospitalized at [facility] a year prior (24/19 to 2/15/19) for right popliteal artery stenosis and was prescribed warfarin.    He presented to [facility] Emergency Department on 2/20/20 with acute pain, swelling, and redness of his right leg stump. His prosthesis was not fitting correctly. He noted a dusky appearance and coolness to touch. He reported that he had stopped taking warfarin, saying "I did not like them." Ultrasound suggested deep femoral artery stenosis and occlusion of the distal superficial femoral artery and popliteal artery. Interventional Cardiology was contacted and recommended loading clopidrogel, giving aspirin and heparin infusion. He was admitted to [facility].    Overview/Hospital Course:  Cardiology recommended continuing the aspirin, clopidrogel, and heparin.    Interval History: Seen by Cardiology.    Review of Systems   Cardiovascular: Positive for leg swelling. Negative for chest pain.   Skin: Positive for color change. Negative for wound.     Objective:     Vital Signs (Most Recent):  Temp: 96.5 °F " (35.8 °C) (02/21/20 1300)  Pulse: 85 (02/21/20 1500)  Resp: 18 (02/21/20 0604)  BP: (!) 168/104 (02/21/20 1500)  SpO2: 95 % (02/21/20 1300) Vital Signs (24h Range):  Temp:  [96.5 °F (35.8 °C)-98.7 °F (37.1 °C)] 96.5 °F (35.8 °C)  Pulse:  [] 85  Resp:  [15-23] 18  SpO2:  [90 %-99 %] 95 %  BP: (161-227)/() 168/104     Weight: 94.9 kg (209 lb 3.5 oz)  Body mass index is 30.9 kg/m².  No intake or output data in the 24 hours ending 02/21/20 1505   Physical Exam   Constitutional: He appears well-developed. No distress.   Pulmonary/Chest: Effort normal. No respiratory distress.   Neurological: He is alert.   Psychiatric: He has a normal mood and affect.   Nursing note and vitals reviewed.      Significant Labs: All pertinent labs within the past 24 hours have been reviewed.    Significant Imaging: I have reviewed all pertinent imaging results/findings within the past 24 hours.   US Lower Extremity Arteries Right 2/21/20: FINDINGS:  No FIDENCIO was performed.  A right below-knee amputation is noted.  Right lower extremity arterial peak systolic velocities in cm/sec:  CFA: 106, triphasic  DFA: 235/73, triphasic  Proximal SFA: 94.6, triphasic  Mid SFA: 91.8, biphasic  Distal SFA: Occlusive at the knee  Proximal pop: Occlusive collateral seen  Distal pop: Occlusive collateral flow seen  Impression: Elevated velocity in the right DFA, which may suggest stenosis.  Occlusion of the distal SFA and popliteal arteries.      Assessment/Plan:      * Critical lower limb ischemia  Antiphospholipid antibody positive  History of right below knee amputation  Giving aspirin, clopidrogel, heparin infusion. Appreciate Cardiology.    Essential hypertension  Hyperlipidemia   Noncompliant with prescribed aspirin, amlodipine, lisinopril-hydrochlorothiazide, atorvastatin. Resumed.       VTE Risk Mitigation (From admission, onward)         Ordered     IP VTE HIGH RISK PATIENT  Once      02/21/20 0233     Reason for no Mechanical VTE  Prophylaxis  Once     Question:  Reasons:  Answer:  Physician Provided (leave comment)  Comment:  on heparin gtt     02/21/20 0233     heparin 25,000 units in dextrose 5% 250 mL (100 units/mL) infusion HIGH INTENSITY nomogram - [facility]  Continuous     Question:  Heparin Infusion Adjustment (DO NOT MODIFY ANSWER)  Answer:  [email or web address]\epic\Images\Pharmacy\HeparinInfusions\heparin HIGH INTENSITY nomogram for [facility] [id number]    02/20/20 2256     heparin 25,000 units in dextrose 5% (100 units/ml) IV bolus from bag - ADDITIONAL PRN BOLUS - 60 units/kg  As needed (PRN)     Question:  Heparin Infusion Adjustment (DO NOT MODIFY ANSWER)  Answer:  [email or web address]\epic\Images\Pharmacy\HeparinInfusions\heparin HIGH INTENSITY nomogram for [facility] [id number].pdf    02/20/20 2256     heparin 25,000 units in dextrose 5% (100 units/ml) IV bolus from bag - ADDITIONAL PRN BOLUS - 30 units/kg  As needed (PRN)     Question:  Heparin Infusion Adjustment (DO NOT MODIFY ANSWER)  Answer:  [email or web address]\epic\Images\Pharmacy\HeparinInfusions\heparin HIGH INTENSITY nomogram for [facility] [id number]    02/20/20 2256                      Andrés Fuentes MD  Department of Hospital Medicine   [facility]

## 2020-02-22 LAB — APTT BLDCRRT: 52 SEC (ref 21–32)

## 2020-02-22 PROCEDURE — 25000003 PHARM REV CODE 250: Performed by: NURSE PRACTITIONER

## 2020-02-22 PROCEDURE — 99233 SBSQ HOSP IP/OBS HIGH 50: CPT | Mod: ,,, | Performed by: INTERNAL MEDICINE

## 2020-02-22 PROCEDURE — 25000003 PHARM REV CODE 250: Performed by: INTERNAL MEDICINE

## 2020-02-22 PROCEDURE — 63600175 PHARM REV CODE 636 W HCPCS: Performed by: EMERGENCY MEDICINE

## 2020-02-22 PROCEDURE — 85730 THROMBOPLASTIN TIME PARTIAL: CPT

## 2020-02-22 PROCEDURE — 63600175 PHARM REV CODE 636 W HCPCS: Performed by: NURSE PRACTITIONER

## 2020-02-22 PROCEDURE — 99233 PR SUBSEQUENT HOSPITAL CARE,LEVL III: ICD-10-PCS | Mod: ,,, | Performed by: INTERNAL MEDICINE

## 2020-02-22 PROCEDURE — 11000001 HC ACUTE MED/SURG PRIVATE ROOM

## 2020-02-22 PROCEDURE — 36415 COLL VENOUS BLD VENIPUNCTURE: CPT

## 2020-02-22 RX ORDER — ATORVASTATIN CALCIUM 40 MG/1
80 TABLET, FILM COATED ORAL DAILY
Status: DISCONTINUED | OUTPATIENT
Start: 2020-02-23 | End: 2020-02-27 | Stop reason: HOSPADM

## 2020-02-22 RX ADMIN — AMLODIPINE BESYLATE 10 MG: 5 TABLET ORAL at 08:02

## 2020-02-22 RX ADMIN — MORPHINE SULFATE 4 MG: 4 INJECTION INTRAVENOUS at 03:02

## 2020-02-22 RX ADMIN — HEPARIN SODIUM 14 UNITS/KG/HR: 10000 INJECTION, SOLUTION INTRAVENOUS at 01:02

## 2020-02-22 RX ADMIN — MORPHINE SULFATE 4 MG: 4 INJECTION INTRAVENOUS at 07:02

## 2020-02-22 RX ADMIN — MORPHINE SULFATE 4 MG: 4 INJECTION INTRAVENOUS at 11:02

## 2020-02-22 RX ADMIN — CLOPIDOGREL 75 MG: 75 TABLET, FILM COATED ORAL at 08:02

## 2020-02-22 RX ADMIN — MORPHINE SULFATE 4 MG: 4 INJECTION INTRAVENOUS at 08:02

## 2020-02-22 RX ADMIN — ATORVASTATIN CALCIUM 40 MG: 40 TABLET, FILM COATED ORAL at 08:02

## 2020-02-22 RX ADMIN — ASPIRIN 81 MG: 81 TABLET, COATED ORAL at 08:02

## 2020-02-22 RX ADMIN — LISINOPRIL 20 MG: 20 TABLET ORAL at 08:02

## 2020-02-22 RX ADMIN — HYDROCHLOROTHIAZIDE 12.5 MG: 12.5 TABLET ORAL at 08:02

## 2020-02-22 NOTE — ASSESSMENT & PLAN NOTE
Hyperlipidemia   Continue home aspirin, amlodipine, lisinopril-hydrochlorothiazide, atorvastatin.

## 2020-02-22 NOTE — PLAN OF CARE
Problem: Adult Inpatient Plan of Care  Goal: Plan of Care Review    Patient is awake and orientedx4. Care plan explained to patient; he verbalized understanding. On room air, O2 saturation at 92%. Hooked to heart monitor running normal sinus rhythm at 79-92bpm. Urinal in reach. No n/v/d during shift. Patient had right extremity pain, prn mediation given. Due medications given. Heparin drip infusing at 11.3mL/hr, 14units/kg. Aptt therapeutic overnight. Encouraged to turn every 2 hours as tolerated. Maintained on fall risk precaution. Bed in lowest position, bed alarm on, call light/personal items within reach and instructed to call for help when needed. Will continue to monitor.    Outcome: Ongoing, Progressing

## 2020-02-22 NOTE — SUBJECTIVE & OBJECTIVE
Review of Systems   Constitution: Negative for diaphoresis and fever.   Cardiovascular: Positive for leg swelling. Negative for chest pain, dyspnea on exertion, irregular heartbeat, near-syncope, orthopnea, palpitations, paroxysmal nocturnal dyspnea and syncope.   Respiratory: Negative for cough, shortness of breath, sputum production and wheezing.    Hematologic/Lymphatic: Negative for bleeding problem. Does not bruise/bleed easily.   Skin: Positive for color change.   Musculoskeletal: Positive for myalgias.   Gastrointestinal: Negative for hematemesis, hematochezia, melena, nausea and vomiting.   Neurological: Negative for dizziness and light-headedness.     Objective:     Vital Signs (Most Recent):  Temp: 96.3 °F (35.7 °C) (02/22/20 0737)  Pulse: 76 (02/22/20 0737)  Resp: 17 (02/22/20 0737)  BP: (!) 140/91 (02/22/20 0737)  SpO2: (!) 91 % (02/22/20 0737) Vital Signs (24h Range):  Temp:  [96.1 °F (35.6 °C)-97.1 °F (36.2 °C)] 96.3 °F (35.7 °C)  Pulse:  [] 76  Resp:  [17-18] 17  SpO2:  [91 %-95 %] 91 %  BP: (135-170)/() 140/91     Weight: 94.9 kg (209 lb 3.5 oz)  Body mass index is 30.9 kg/m².     SpO2: (!) 91 %  O2 Device (Oxygen Therapy): room air      Intake/Output Summary (Last 24 hours) at 2/22/2020 1006  Last data filed at 2/22/2020 0900  Gross per 24 hour   Intake 809.14 ml   Output 500 ml   Net 309.14 ml       Lines/Drains/Airways     Peripheral Intravenous Line                 Peripheral IV - Single Lumen 02/20/20 2157 18 G Right;Proximal Forearm 1 day         Peripheral IV - Single Lumen 02/21/20 0020 18 G Left Antecubital 1 day                Physical Exam   Constitutional: He appears well-developed. No distress.   Neck: No JVD present.   Cardiovascular: Normal rate and regular rhythm. Exam reveals no gallop and no friction rub.   No murmur heard.  Pulmonary/Chest: Effort normal. No respiratory distress.   Abdominal: Soft. Bowel sounds are normal. There is no tenderness.   Musculoskeletal: He  exhibits edema (Right thigh and stump).   Right knee and stump warm to touch, tender   Skin: He is not diaphoretic.       Significant Labs:   CMP   Recent Labs   Lab 02/20/20  2158      K 3.4*      CO2 19*   GLU 81   BUN 8   CREATININE 0.8   CALCIUM 9.7   PROT 8.2   ALBUMIN 4.4   BILITOT 0.5   ALKPHOS 104   AST 35   ALT 16   ANIONGAP 17*   ESTGFRAFRICA >60   EGFRNONAA >60   , CBC   Recent Labs   Lab 02/20/20 2158 02/21/20  0543   WBC 11.50 11.29   HGB 15.9 14.5   HCT 46.8 43.4    228    and Troponin No results for input(s): TROPONINI in the last 48 hours.

## 2020-02-22 NOTE — PLAN OF CARE
Problem: Wound  Goal: Optimal Wound Healing  2/21/2020 1911 by Chantal Canales RN  Outcome: Ongoing, Progressing  2/21/2020 1910 by Chantal Canales RN  Outcome: Ongoing, Progressing     Problem: Fall Injury Risk  Goal: Absence of Fall and Fall-Related Injury  2/21/2020 1911 by Chantal Canales RN  Outcome: Ongoing, Progressing  2/21/2020 1910 by Chantal Canales RN  Outcome: Ongoing, Progressing     Problem: Adult Inpatient Plan of Care  Goal: Plan of Care Review  2/21/2020 1911 by Chantal Canales RN  Outcome: Ongoing, Progressing  2/21/2020 1910 by Chantal Canales RN  Outcome: Ongoing, Progressing  Goal: Patient-Specific Goal (Individualization)  2/21/2020 1911 by Chantal Canales RN  Outcome: Ongoing, Progressing  2/21/2020 1910 by Chantal Canales RN  Outcome: Ongoing, Progressing  Goal: Absence of Hospital-Acquired Illness or Injury  2/21/2020 1911 by Chantal Canales RN  Outcome: Ongoing, Progressing  2/21/2020 1910 by Chantal Canales RN  Outcome: Ongoing, Progressing  Goal: Optimal Comfort and Wellbeing  2/21/2020 1911 by Chantal Canales RN  Outcome: Ongoing, Progressing  2/21/2020 1910 by Chantal Canales RN  Outcome: Ongoing, Progressing  Goal: Readiness for Transition of Care  2/21/2020 1911 by Chantal Canales RN  Outcome: Ongoing, Progressing  2/21/2020 1910 by Chantal Canales RN  Outcome: Ongoing, Progressing  Goal: Rounds/Family Conference  2/21/2020 1911 by Chantal Canales RN  Outcome: Ongoing, Progressing  2/21/2020 1910 by Chantal Canales RN  Outcome: Ongoing, Progressing     Problem: Hypertension Comorbidity  Goal: Blood Pressure in Desired Range  2/21/2020 1911 by Chantal Canales RN  Outcome: Ongoing, Progressing  2/21/2020 1910 by Chantal Canales RN  Outcome: Ongoing, Progressing     Problem: Pain Chronic (Persistent) (Comorbidity Management)  Goal: Acceptable Pain Control and Functional Ability  2/21/2020 1911 by Chantal Canales RN  Outcome: Ongoing, Progressing  2/21/2020 1910 by Chantal Canales RN  Outcome: Ongoing,  Progressing     Problem: Tissue Perfusion Altered  Goal: Improved Tissue Perfusion  2/21/2020 1911 by Chantal Canales RN  Outcome: Ongoing, Progressing  2/21/2020 1910 by Chantal Canales RN  Outcome: Ongoing, Progressing     Problem: Skin Injury Risk Increased  Goal: Skin Health and Integrity  2/21/2020 1911 by Chantal Canales RN  Outcome: Ongoing, Progressing  2/21/2020 1910 by Chantal Canales RN  Outcome: Ongoing, Progressing

## 2020-02-22 NOTE — SUBJECTIVE & OBJECTIVE
Interval History: Leg stump feels warmer, suggesting improvement. Cardiology told him that they will see if he needs peripheral angiogram on Monday.    Review of Systems   Constitutional: Negative for chills and fever.   Gastrointestinal: Negative for nausea and vomiting.     Objective:     Vital Signs (Most Recent):  Temp: 96.3 °F (35.7 °C) (02/22/20 0737)  Pulse: 76 (02/22/20 0737)  Resp: 17 (02/22/20 0737)  BP: (!) 140/91 (02/22/20 0737)  SpO2: (!) 91 % (02/22/20 0737) Vital Signs (24h Range):  Temp:  [96.1 °F (35.6 °C)-97.1 °F (36.2 °C)] 96.3 °F (35.7 °C)  Pulse:  [] 76  Resp:  [17-18] 17  SpO2:  [91 %-95 %] 91 %  BP: (135-170)/() 140/91     Weight: 94.9 kg (209 lb 3.5 oz)  Body mass index is 30.9 kg/m².    Intake/Output Summary (Last 24 hours) at 2/22/2020 1008  Last data filed at 2/22/2020 0900  Gross per 24 hour   Intake 809.14 ml   Output 500 ml   Net 309.14 ml      Physical Exam   Constitutional: He appears well-developed. No distress.   Pulmonary/Chest: Effort normal. No respiratory distress.   Neurological: He is alert.   Psychiatric: He has a normal mood and affect.   Nursing note and vitals reviewed.      Significant Labs: All pertinent labs within the past 24 hours have been reviewed.    Significant Imaging: I have reviewed all pertinent imaging results/findings within the past 24 hours.

## 2020-02-22 NOTE — PROGRESS NOTES
"[facility] Hospital Medicine  Progress Note    Patient Name: Ren Grove  MRN: [id number]  Patient Class: IP- Inpatient   Admission Date: 2/20/2020  Length of Stay: 1 days  Attending Physician: Andrés Fuentes MD  Primary Care Provider: Nomi Bermeo MD        Subjective:     Principal Problem:Critical lower limb ischemia        HPI:  Ren Grove is a 39 y.o. 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 He lives in [place], [place]. His primary care physician is Dr. Nomi Bermeo.   He had been hospitalized at [facility] a year prior (24/19 to 2/15/19) for right popliteal artery stenosis and was prescribed warfarin.    He presented to [facility] Emergency Department on 2/20/20 with acute pain, swelling, and redness of his right leg stump. His prosthesis was not fitting correctly. He noted a dusky appearance and coolness to touch. He reported that he had stopped taking warfarin, saying "I did not like them" and that his primary doctor and the person who made his prosthetic thought that aspirin was probably enough. Ultrasound suggested deep femoral artery stenosis and occlusion of the distal superficial femoral artery and popliteal artery. Interventional Cardiology was contacted and recommended loading clopidrogel, giving aspirin and heparin infusion. He was admitted to [facility].    Overview/Hospital Course:  Cardiology recommended continuing the aspirin, clopidrogel, and heparin.    Interval History: Leg stump feels warmer, suggesting improvement. Cardiology told him that they will see if he needs peripheral angiogram " on Monday.    Review of Systems   Constitutional: Negative for chills and fever.   Gastrointestinal: Negative for nausea and vomiting.     Objective:     Vital Signs (Most Recent):  Temp: 96.3 °F (35.7 °C) (02/22/20 0737)  Pulse: 76 (02/22/20 0737)  Resp: 17 (02/22/20 0737)  BP: (!) 140/91 (02/22/20 0737)  SpO2: (!) 91 % (02/22/20 0737) Vital Signs (24h Range):  Temp:  [96.1 °F (35.6 °C)-97.1 °F (36.2 °C)] 96.3 °F (35.7 °C)  Pulse:  [] 76  Resp:  [17-18] 17  SpO2:  [91 %-95 %] 91 %  BP: (135-170)/() 140/91     Weight: 94.9 kg (209 lb 3.5 oz)  Body mass index is 30.9 kg/m².    Intake/Output Summary (Last 24 hours) at 2/22/2020 1008  Last data filed at 2/22/2020 0900  Gross per 24 hour   Intake 809.14 ml   Output 500 ml   Net 309.14 ml      Physical Exam   Constitutional: He appears well-developed. No distress.   Pulmonary/Chest: Effort normal. No respiratory distress.   Neurological: He is alert.   Psychiatric: He has a normal mood and affect.   Nursing note and vitals reviewed.      Significant Labs: All pertinent labs within the past 24 hours have been reviewed.    Significant Imaging: I have reviewed all pertinent imaging results/findings within the past 24 hours.       Assessment/Plan:      * Critical lower limb ischemia  Antiphospholipid antibody positive  History of right below knee amputation  Giving aspirin, clopidrogel, heparin infusion. Appreciate Cardiology.    Essential hypertension  Hyperlipidemia   Continue home aspirin, amlodipine, lisinopril-hydrochlorothiazide, atorvastatin.       VTE Risk Mitigation (From admission, onward)         Ordered     IP VTE HIGH RISK PATIENT  Once      02/21/20 0233     Reason for no Mechanical VTE Prophylaxis  Once     Question:  Reasons:  Answer:  Physician Provided (leave comment)  Comment:  on heparin gtt     02/21/20 0233     heparin 25,000 units in dextrose 5% 250 mL (100 units/mL) infusion HIGH INTENSITY nomogram - [facility]  Continuous     Question:  Heparin  Infusion Adjustment (DO NOT MODIFY ANSWER)  Answer:  [email or web address]\epic\Images\Pharmacy\HeparinInfusions\heparin HIGH INTENSITY nomogram for [facility] [id number].pdf    02/20/20 2256     heparin 25,000 units in dextrose 5% (100 units/ml) IV bolus from bag - ADDITIONAL PRN BOLUS - 60 units/kg  As needed (PRN)     Question:  Heparin Infusion Adjustment (DO NOT MODIFY ANSWER)  Answer:  [email or web address]\epic\Images\Pharmacy\HeparinInfusions\heparin HIGH INTENSITY nomogram for [facility] [id number].pdf    02/20/20 2256     heparin 25,000 units in dextrose 5% (100 units/ml) IV bolus from bag - ADDITIONAL PRN BOLUS - 30 units/kg  As needed (PRN)     Question:  Heparin Infusion Adjustment (DO NOT MODIFY ANSWER)  Answer:  [email or web address]\epic\Images\Pharmacy\HeparinInfusions\heparin HIGH INTENSITY nomogram for [facility] [id number].pdf    02/20/20 2256                      Andrés Fuentes MD  Department of Hospital Medicine   [facility]

## 2020-02-22 NOTE — PLAN OF CARE
VN note: VN cued into patient's room for introduction. Bedside Nurse Chantal in room. VN informed patient that VN would be working closely along side bedside nurse, PCT, and the rest of the care team and making rounds throughout the shift. Patient verbalized understanding. Allowed time for questions. VN will continue to be available to patient and intervene prn.

## 2020-02-22 NOTE — PLAN OF CARE
Problem: Wound  Goal: Optimal Wound Healing  Outcome: Ongoing, Progressing     Problem: Fall Injury Risk  Goal: Absence of Fall and Fall-Related Injury  Outcome: Ongoing, Progressing     Problem: Adult Inpatient Plan of Care  Goal: Plan of Care Review  Outcome: Ongoing, Progressing  Goal: Patient-Specific Goal (Individualization)  Outcome: Ongoing, Progressing  Goal: Absence of Hospital-Acquired Illness or Injury  Outcome: Ongoing, Progressing  Goal: Optimal Comfort and Wellbeing  Outcome: Ongoing, Progressing  Goal: Readiness for Transition of Care  Outcome: Ongoing, Progressing  Goal: Rounds/Family Conference  Outcome: Ongoing, Progressing     Problem: Hypertension Comorbidity  Goal: Blood Pressure in Desired Range  Outcome: Ongoing, Progressing     Problem: Pain Chronic (Persistent) (Comorbidity Management)  Goal: Acceptable Pain Control and Functional Ability  Outcome: Ongoing, Progressing     Problem: Tissue Perfusion Altered  Goal: Improved Tissue Perfusion  Outcome: Ongoing, Progressing     Problem: Skin Injury Risk Increased  Goal: Skin Health and Integrity  Outcome: Ongoing, Progressing

## 2020-02-22 NOTE — PROGRESS NOTES
[facility]  Cardiology  Progress Note    Patient Name: Ren Grove  MRN: [id number]  Admission Date: 2/20/2020  Hospital Length of Stay: 1 days  Code Status: Full Code   Attending Physician: Andrés Fuentes MD   Primary Care Physician: Nomi Bermeo MD  Expected Discharge Date:   Principal Problem:Critical lower limb ischemia    Subjective:     Hospital Course:   02/21/2020 RLE discoloration which demarcates at the proximal level of his prosthesis noted. Stump noted warm to touch. Pain persists. SBP elevated- medications adjusted. Heparin gtt continued.   2/22/20: Continues to have right leg soreness. Denies any worsening symptoms. Continued hep gtt      Review of Systems   Constitution: Negative for diaphoresis and fever.   Cardiovascular: Positive for leg swelling. Negative for chest pain, dyspnea on exertion, irregular heartbeat, near-syncope, orthopnea, palpitations, paroxysmal nocturnal dyspnea and syncope.   Respiratory: Negative for cough, shortness of breath, sputum production and wheezing.    Hematologic/Lymphatic: Negative for bleeding problem. Does not bruise/bleed easily.   Skin: Positive for color change.   Musculoskeletal: Positive for myalgias.   Gastrointestinal: Negative for hematemesis, hematochezia, melena, nausea and vomiting.   Neurological: Negative for dizziness and light-headedness.     Objective:     Vital Signs (Most Recent):  Temp: 96.3 °F (35.7 °C) (02/22/20 0737)  Pulse: 76 (02/22/20 0737)  Resp: 17 (02/22/20 0737)  BP: (!) 140/91 (02/22/20 0737)  SpO2: (!) 91 % (02/22/20 0737) Vital Signs (24h Range):  Temp:  [96.1 °F (35.6 °C)-97.1 °F (36.2 °C)] 96.3 °F (35.7 °C)  Pulse:  [] 76  Resp:  [17-18] 17  SpO2:  [91 %-95 %] 91 %  BP: (135-170)/() 140/91     Weight: 94.9 kg (209 lb 3.5 oz)  Body mass index is 30.9 kg/m².     SpO2: (!) 91 %  O2 Device (Oxygen Therapy): room air      Intake/Output Summary (Last 24 hours) at 2/22/2020 1006  Last data filed at  2/22/2020 0900  Gross per 24 hour   Intake 809.14 ml   Output 500 ml   Net 309.14 ml       Lines/Drains/Airways     Peripheral Intravenous Line                 Peripheral IV - Single Lumen 02/20/20 2157 18 G Right;Proximal Forearm 1 day         Peripheral IV - Single Lumen 02/21/20 0020 18 G Left Antecubital 1 day                Physical Exam   Constitutional: He appears well-developed. No distress.   Neck: No JVD present.   Cardiovascular: Normal rate and regular rhythm. Exam reveals no gallop and no friction rub.   No murmur heard.  Pulmonary/Chest: Effort normal. No respiratory distress.   Abdominal: Soft. Bowel sounds are normal. There is no tenderness.   Musculoskeletal: He exhibits edema (Right thigh and stump).   Right knee and stump warm to touch, tender   Skin: He is not diaphoretic.       Significant Labs:   CMP   Recent Labs   Lab 02/20/20 2158      K 3.4*      CO2 19*   GLU 81   BUN 8   CREATININE 0.8   CALCIUM 9.7   PROT 8.2   ALBUMIN 4.4   BILITOT 0.5   ALKPHOS 104   AST 35   ALT 16   ANIONGAP 17*   ESTGFRAFRICA >60   EGFRNONAA >60   , CBC   Recent Labs   Lab 02/20/20 2158 02/21/20  0543   WBC 11.50 11.29   HGB 15.9 14.5   HCT 46.8 43.4    228    and Troponin No results for input(s): TROPONINI in the last 48 hours.      Assessment and Plan:     40 yo male h/o right BKA following DVT 2012, compartment syndrome left leg sp fasciotomy 2015, HTN, active tobacco user     Presented 2/20/20 for acute onset pain and swelling right leg stump, starting between noon and 2 pm, in the setting of discontinuing coumadin a few months back, and continued aspirin 81 mg daily. Ultrasound RLE suggestive of DFA stenosis. Occlusion of distal SFA and pop arteries.      Right leg stump continues to be warm to touch, compared to on presentation. Continues to report soreness in the stump.     Continue heparin gtt, aspirin 81 mg daily, clopidogrel 75 mg daily.  Recommend increasing atorvastatin to 80 mg  daily     HTN  BP better controlled with home medication on board.  Continue lisinopril 20 mg daily, HCTZ 12.5 mg daily, amlodipine 10 mg daily.     Tobacco use - cessation referral        VTE Risk Mitigation (From admission, onward)         Ordered     IP VTE HIGH RISK PATIENT  Once      02/21/20 0233     Reason for no Mechanical VTE Prophylaxis  Once     Question:  Reasons:  Answer:  Physician Provided (leave comment)  Comment:  on heparin gtt     02/21/20 0233     heparin 25,000 units in dextrose 5% 250 mL (100 units/mL) infusion HIGH INTENSITY nomogram - [facility]  Continuous     Question:  Heparin Infusion Adjustment (DO NOT MODIFY ANSWER)  Answer:  [email or web address]\epic\Images\Pharmacy\HeparinInfusions\heparin HIGH INTENSITY nomogram for [facility] [id number].pdf    02/20/20 2256     heparin 25,000 units in dextrose 5% (100 units/ml) IV bolus from bag - ADDITIONAL PRN BOLUS - 60 units/kg  As needed (PRN)     Question:  Heparin Infusion Adjustment (DO NOT MODIFY ANSWER)  Answer:  [email or web address]\epic\Images\Pharmacy\HeparinInfusions\heparin HIGH INTENSITY nomogram for [facility] [id number].pdf    02/20/20 2256     heparin 25,000 units in dextrose 5% (100 units/ml) IV bolus from bag - ADDITIONAL PRN BOLUS - 30 units/kg  As needed (PRN)     Question:  Heparin Infusion Adjustment (DO NOT MODIFY ANSWER)  Answer:  [email or web address]\epic\Images\Pharmacy\HeparinInfusions\heparin HIGH INTENSITY nomogram for [facility] [id number].pdf    02/20/20 2256                Sarah Russell MD  Cardiology  [facility]

## 2020-02-23 LAB — APTT BLDCRRT: 49.8 SEC (ref 21–32)

## 2020-02-23 PROCEDURE — 85730 THROMBOPLASTIN TIME PARTIAL: CPT

## 2020-02-23 PROCEDURE — 11000001 HC ACUTE MED/SURG PRIVATE ROOM

## 2020-02-23 PROCEDURE — 25000003 PHARM REV CODE 250: Performed by: NURSE PRACTITIONER

## 2020-02-23 PROCEDURE — 63600175 PHARM REV CODE 636 W HCPCS: Performed by: EMERGENCY MEDICINE

## 2020-02-23 PROCEDURE — 99233 SBSQ HOSP IP/OBS HIGH 50: CPT | Mod: ,,, | Performed by: INTERNAL MEDICINE

## 2020-02-23 PROCEDURE — 25000003 PHARM REV CODE 250: Performed by: INTERNAL MEDICINE

## 2020-02-23 PROCEDURE — 25000003 PHARM REV CODE 250: Performed by: HOSPITALIST

## 2020-02-23 PROCEDURE — 99233 PR SUBSEQUENT HOSPITAL CARE,LEVL III: ICD-10-PCS | Mod: ,,, | Performed by: INTERNAL MEDICINE

## 2020-02-23 PROCEDURE — 36415 COLL VENOUS BLD VENIPUNCTURE: CPT

## 2020-02-23 PROCEDURE — 63600175 PHARM REV CODE 636 W HCPCS: Performed by: NURSE PRACTITIONER

## 2020-02-23 RX ADMIN — MORPHINE SULFATE 4 MG: 4 INJECTION INTRAVENOUS at 12:02

## 2020-02-23 RX ADMIN — HEPARIN SODIUM 14 UNITS/KG/HR: 10000 INJECTION, SOLUTION INTRAVENOUS at 12:02

## 2020-02-23 RX ADMIN — MORPHINE SULFATE 4 MG: 4 INJECTION INTRAVENOUS at 09:02

## 2020-02-23 RX ADMIN — CLOPIDOGREL 75 MG: 75 TABLET, FILM COATED ORAL at 09:02

## 2020-02-23 RX ADMIN — HYDROCHLOROTHIAZIDE 12.5 MG: 12.5 TABLET ORAL at 09:02

## 2020-02-23 RX ADMIN — MORPHINE SULFATE 4 MG: 4 INJECTION INTRAVENOUS at 03:02

## 2020-02-23 RX ADMIN — AMLODIPINE BESYLATE 10 MG: 5 TABLET ORAL at 09:02

## 2020-02-23 RX ADMIN — ATORVASTATIN CALCIUM 80 MG: 40 TABLET, FILM COATED ORAL at 09:02

## 2020-02-23 RX ADMIN — LISINOPRIL 20 MG: 20 TABLET ORAL at 09:02

## 2020-02-23 RX ADMIN — MORPHINE SULFATE 4 MG: 4 INJECTION INTRAVENOUS at 04:02

## 2020-02-23 RX ADMIN — ASPIRIN 81 MG: 81 TABLET, COATED ORAL at 09:02

## 2020-02-23 RX ADMIN — MORPHINE SULFATE 4 MG: 4 INJECTION INTRAVENOUS at 07:02

## 2020-02-23 NOTE — PROGRESS NOTES
[facility]  Cardiology  Progress Note    Patient Name: Ren Grove  MRN: [id number]  Admission Date: 2/20/2020  Hospital Length of Stay: 2 days  Code Status: Full Code   Attending Physician: nAdrés Fuentes MD   Primary Care Physician: Nomi Bermeo MD  Expected Discharge Date:   Principal Problem:Critical lower limb ischemia    Subjective:     Hospital Course:   02/21/2020 RLE discoloration which demarcates at the proximal level of his prosthesis noted. Stump noted warm to touch. Pain persists. SBP elevated- medications adjusted. Heparin gtt continued.   2/22/20: Continues to have right leg soreness. Denies any worsening symptoms. Continued hep gtt  2/23/20: Noted to have increased swelling in the right leg stump and felt cooler overnight. Better today. Soreness continues (no worsening)    Review of Systems   Constitution: Negative for diaphoresis and fever.   Cardiovascular: Positive for leg swelling. Negative for chest pain, dyspnea on exertion, irregular heartbeat, near-syncope, orthopnea, palpitations, paroxysmal nocturnal dyspnea and syncope.   Respiratory: Negative for cough, shortness of breath, sputum production and wheezing.    Hematologic/Lymphatic: Negative for bleeding problem. Does not bruise/bleed easily.   Skin: Positive for color change.   Musculoskeletal: Positive for myalgias.   Gastrointestinal: Negative for hematemesis, hematochezia, melena, nausea and vomiting.   Neurological: Negative for dizziness and light-headedness.     Objective:     Vital Signs (Most Recent):  Temp: 98 °F (36.7 °C) (02/23/20 1142)  Pulse: 86 (02/23/20 1149)  Resp: 16 (02/23/20 1142)  BP: 115/84 (02/23/20 1142)  SpO2: 95 % (02/23/20 0415) Vital Signs (24h Range):  Temp:  [96.5 °F (35.8 °C)-98 °F (36.7 °C)] 98 °F (36.7 °C)  Pulse:  [] 86  Resp:  [16-18] 16  SpO2:  [93 %-95 %] 95 %  BP: (115-166)/(84-97) 115/84     Weight: 94.9 kg (209 lb 3.5 oz)  Body mass index is 30.9 kg/m².     SpO2: 95  %  O2 Device (Oxygen Therapy): room air    No intake or output data in the 24 hours ending 02/23/20 1233    Lines/Drains/Airways     Peripheral Intravenous Line                 Peripheral IV - Single Lumen 02/20/20 2157 18 G Right;Proximal Forearm 2 days         Peripheral IV - Single Lumen 02/22/20 1950 20 G;22 G Posterior;Right Wrist less than 1 day                Physical Exam   Constitutional: He appears well-developed. No distress.   Neck: No JVD present.   Cardiovascular: Normal rate and regular rhythm. Exam reveals no gallop and no friction rub.   No murmur heard.  Pulmonary/Chest: Effort normal. No respiratory distress.   Abdominal: Soft. Bowel sounds are normal. There is no tenderness.   Musculoskeletal: He exhibits edema (Right thigh and stump).   Right knee and stump warm to touch, tender   Skin: He is not diaphoretic.       Assessment and Plan:     40 yo male h/o right BKA following DVT 2012, compartment syndrome left leg sp fasciotomy 2015, HTN, active tobacco user     Presented 2/20/20 for acute onset pain and swelling right leg stump, starting between noon and 2 pm, in the setting of discontinuing coumadin a few months back, and continued aspirin 81 mg daily. Ultrasound RLE suggestive of DFA stenosis. Occlusion of distal SFA and pop arteries.      Right leg stump continues to be warm to touch, compared to on presentation. Continues to report soreness in the stump.     Continue heparin gtt, aspirin 81 mg daily, clopidogrel 75 mg daily, atorvastatin to 80 mg daily  NPO after midnight tonight     HTN  Lisinopril 20 mg daily, HCTZ 12.5 mg daily, amlodipine 10 mg daily.     Tobacco use - cessation referral        VTE Risk Mitigation (From admission, onward)         Ordered     IP VTE HIGH RISK PATIENT  Once      02/21/20 0233     Reason for no Mechanical VTE Prophylaxis  Once     Question:  Reasons:  Answer:  Physician Provided (leave comment)  Comment:  on heparin gtt     02/21/20 0233     heparin 25,000  units in dextrose 5% 250 mL (100 units/mL) infusion HIGH INTENSITY nomogram - [facility]  Continuous     Question:  Heparin Infusion Adjustment (DO NOT MODIFY ANSWER)  Answer:  [email or web address]\epic\Images\Pharmacy\HeparinInfusions\heparin HIGH INTENSITY nomogram for [facility] [id number].pdf    02/20/20 2256     heparin 25,000 units in dextrose 5% (100 units/ml) IV bolus from bag - ADDITIONAL PRN BOLUS - 60 units/kg  As needed (PRN)     Question:  Heparin Infusion Adjustment (DO NOT MODIFY ANSWER)  Answer:  [email or web address]\epic\Images\Pharmacy\HeparinInfusions\heparin HIGH INTENSITY nomogram for [facility] [id number].pdf    02/20/20 2256     heparin 25,000 units in dextrose 5% (100 units/ml) IV bolus from bag - ADDITIONAL PRN BOLUS - 30 units/kg  As needed (PRN)     Question:  Heparin Infusion Adjustment (DO NOT MODIFY ANSWER)  Answer:  [email or web address]\epic\Images\Pharmacy\HeparinInfusions\heparin HIGH INTENSITY nomogram for [facility] [id number].pdf    02/20/20 2256                Sarah Russell MD  Cardiology  [facility]

## 2020-02-23 NOTE — PROGRESS NOTES
"      [facility] Hospital Medicine  Progress Note    Patient Name: Ren Grove  MRN: [id number]  Patient Class: IP- Inpatient   Admission Date: 2/20/2020  Length of Stay: 2 days  Attending Physician: Andrés Fuentes MD  Primary Care Provider: Nomi Bermeo MD        Subjective:     Principal Problem:Critical lower limb ischemia       HPI:  Ren Grove is a 39 y.o. 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 He lives in [place], [place]. His primary care physician is Dr. Nomi Bermeo.              He had been hospitalized at [facility] a year prior (24/19 to 2/15/19) for right popliteal artery stenosis and was prescribed warfarin.               He presented to [facility] Emergency Department on 2/20/20 with acute pain, swelling, and redness of his right leg stump. His prosthesis was not fitting correctly. He noted a dusky appearance and coolness to touch. He reported that he had stopped taking warfarin, saying "I did not like them" and that his primary doctor and the person who made his prosthetic thought that aspirin was probably enough. Ultrasound suggested deep femoral artery stenosis and occlusion of the distal superficial femoral artery and popliteal artery. Interventional Cardiology was contacted and recommended loading clopidrogel, giving aspirin and heparin infusion. He was admitted to [facility].    Interval History: no acute events overnight. Needs peripheral angiogram on Monday.    Review of Systems     Constitutional: Negative for chills and fever.   Eyes: Negative for photophobia.   Respiratory: Negative " for cough, chest tightness, shortness of breath and wheezing.    Cardiovascular: Negative for chest pain, palpitations and leg swelling.   Gastrointestinal: Negative for abdominal pain, diarrhea, nausea and vomiting.   Genitourinary: Negative for dysuria, flank pain and hematuria.   Musculoskeletal: Positive for arthralgias. Negative for back pain, myalgias and neck pain.   Skin: Positive for color change (right leg ). Negative for rash and wound.   Neurological: Negative for dizziness, syncope and headaches.   Psychiatric/Behavioral: Negative for agitation.   Objective:     Vital Signs (Most Recent):  Temp: 96.5 °F (35.8 °C) (02/23/20 0711)  Pulse: 84 (02/23/20 0711)  Resp: 16 (02/23/20 0711)  BP: 135/88 (02/23/20 0711)  SpO2: 95 % (02/23/20 0415) Vital Signs (24h Range):  Temp:  [96.5 °F (35.8 °C)-97.7 °F (36.5 °C)] 96.5 °F (35.8 °C)  Pulse:  [] 84  Resp:  [16-18] 16  SpO2:  [90 %-95 %] 95 %  BP: (133-166)/(84-97) 135/88     Weight: 94.9 kg (209 lb 3.5 oz)  Body mass index is 30.9 kg/m².    Intake/Output Summary (Last 24 hours) at 2/23/2020 1012  Last data filed at 2/22/2020 1200  Gross per 24 hour   Intake 250 ml   Output --   Net 250 ml      Physical Exam    Constitutional: He is oriented to person, place, and time. He appears well-developed and well-nourished. No distress.   HENT:   Head: Normocephalic and atraumatic.   Eyes: Pupils are equal, round, and reactive to light. Conjunctivae are normal.   Neck: Normal range of motion. Neck supple. No JVD present.   Cardiovascular: Normal rate, regular rhythm, normal heart sounds and intact distal pulses.   Well demarcated skin changes to the RLE in the proximal thigh, consisting of dusky appearance that worsens as you go distally to his BKA stump.  RLE is cold to touch    Pulmonary/Chest: Effort normal and breath sounds normal. No respiratory distress. He has no wheezes.   Abdominal: Soft. Bowel sounds are normal. He exhibits no distension. There is no  tenderness. There is no guarding.   Musculoskeletal: He exhibits no edema or tenderness.   Neurological: He is alert and oriented to person, place, and time.   Skin: Skin is warm and dry. Capillary refill takes less than 2 seconds.   Psychiatric: He has a normal mood and affect. His behavior is normal.        Significant Labs: CBC: No results for input(s): WBC, HGB, HCT, PLT in the last 48 hours.  CMP: No results for input(s): NA, K, CL, CO2, GLU, BUN, CREATININE, CALCIUM, PROT, ALBUMIN, BILITOT, ALKPHOS, AST, ALT, ANIONGAP, EGFRNONAA in the last 48 hours.    Invalid input(s): ESTGFAFRICA    Significant Imaging: I have reviewed and interpreted all pertinent imaging results/findings within the past 24 hours.    Assessment/Plan:      Active Diagnoses:    Diagnosis Date Noted POA    PRINCIPAL PROBLEM:  Critical lower limb ischemia [I99.8] 02/21/2020 Yes    Antiphospholipid antibody positive [R76.0] 03/03/2016 Yes     Chronic    Essential hypertension [I10] 01/20/2016 Yes     Chronic    History of right below knee amputation [Z89.511] 01/20/2016 Not Applicable     Chronic    HLD (hyperlipidemia) [E78.5] 01/20/2016 Yes     Chronic    Anxiety and depression [F41.9, F32.9] 01/20/2016 Yes     Chronic    Peripheral vascular disorder [I73.9] 12/09/2015 Yes     Chronic      Problems Resolved During this Admission:    Diagnosis Date Noted Date Resolved POA    Warfarin anticoagulation [Z79.01] 02/15/2019 02/21/2020 Not Applicable     VTE Risk Mitigation (From admission, onward)         Ordered     IP VTE HIGH RISK PATIENT  Once      02/21/20 0233     Reason for no Mechanical VTE Prophylaxis  Once     Question:  Reasons:  Answer:  Physician Provided (leave comment)  Comment:  on heparin gtt     02/21/20 0233     heparin 25,000 units in dextrose 5% 250 mL (100 units/mL) infusion HIGH INTENSITY nomogram - [facility]  Continuous     Question:  Heparin Infusion Adjustment (DO NOT MODIFY ANSWER)  Answer:   \\[email or web address]\epic\Images\Pharmacy\HeparinInfusions\heparin HIGH INTENSITY nomogram for [facility] [id number].pdf    02/20/20 2256     heparin 25,000 units in dextrose 5% (100 units/ml) IV bolus from bag - ADDITIONAL PRN BOLUS - 60 units/kg  As needed (PRN)     Question:  Heparin Infusion Adjustment (DO NOT MODIFY ANSWER)  Answer:  [email or web address]\epic\Images\Pharmacy\HeparinInfusions\heparin HIGH INTENSITY nomogram for [facility] [id number].pdf    02/20/20 2256     heparin 25,000 units in dextrose 5% (100 units/ml) IV bolus from bag - ADDITIONAL PRN BOLUS - 30 units/kg  As needed (PRN)     Question:  Heparin Infusion Adjustment (DO NOT MODIFY ANSWER)  Answer:  [email or web address]\epic\Images\Pharmacy\HeparinInfusions\heparin HIGH INTENSITY nomogram for [facility] [id number].pdf    02/20/20 2256               * Critical lower limb ischemia  Pain in right lower leg     Cardiology following. Continue ASA, plavix and  Heparin gtt. Keep NPO. Plan to re-evaluate to determine revascularization plan        Warfarin anticoagulation  INR 1.0. Patient noncompliant, has not taken in 1 month         Essential hypertension  Hyperlipidemia      Goal -160. Patient noncompliant with home ASA, amlodipine, lisinopril-hctz and atorvastatin (has not taken in 1 month)    Juan Woods MD  Department of Hospital Medicine   [facility]

## 2020-02-23 NOTE — SUBJECTIVE & OBJECTIVE
Review of Systems   Constitution: Negative for diaphoresis and fever.   Cardiovascular: Positive for leg swelling. Negative for chest pain, dyspnea on exertion, irregular heartbeat, near-syncope, orthopnea, palpitations, paroxysmal nocturnal dyspnea and syncope.   Respiratory: Negative for cough, shortness of breath, sputum production and wheezing.    Hematologic/Lymphatic: Negative for bleeding problem. Does not bruise/bleed easily.   Skin: Positive for color change.   Musculoskeletal: Positive for myalgias.   Gastrointestinal: Negative for hematemesis, hematochezia, melena, nausea and vomiting.   Neurological: Negative for dizziness and light-headedness.     Objective:     Vital Signs (Most Recent):  Temp: 98 °F (36.7 °C) (02/23/20 1142)  Pulse: 86 (02/23/20 1149)  Resp: 16 (02/23/20 1142)  BP: 115/84 (02/23/20 1142)  SpO2: 95 % (02/23/20 0415) Vital Signs (24h Range):  Temp:  [96.5 °F (35.8 °C)-98 °F (36.7 °C)] 98 °F (36.7 °C)  Pulse:  [] 86  Resp:  [16-18] 16  SpO2:  [93 %-95 %] 95 %  BP: (115-166)/(84-97) 115/84     Weight: 94.9 kg (209 lb 3.5 oz)  Body mass index is 30.9 kg/m².     SpO2: 95 %  O2 Device (Oxygen Therapy): room air    No intake or output data in the 24 hours ending 02/23/20 1233    Lines/Drains/Airways     Peripheral Intravenous Line                 Peripheral IV - Single Lumen 02/20/20 2157 18 G Right;Proximal Forearm 2 days         Peripheral IV - Single Lumen 02/22/20 1950 20 G;22 G Posterior;Right Wrist less than 1 day                Physical Exam   Constitutional: He appears well-developed. No distress.   Neck: No JVD present.   Cardiovascular: Normal rate and regular rhythm. Exam reveals no gallop and no friction rub.   No murmur heard.  Pulmonary/Chest: Effort normal. No respiratory distress.   Abdominal: Soft. Bowel sounds are normal. There is no tenderness.   Musculoskeletal: He exhibits edema (Right thigh and stump).   Right knee and stump warm to touch, tender   Skin: He is not  diaphoretic.

## 2020-02-24 ENCOUNTER — CLINICAL SUPPORT (OUTPATIENT)
Dept: SMOKING CESSATION | Facility: CLINIC | Age: 40
End: 2020-02-24

## 2020-02-24 DIAGNOSIS — F17.210 CIGARETTE SMOKER: Primary | ICD-10-CM

## 2020-02-24 LAB
ANION GAP SERPL CALC-SCNC: 8 MMOL/L (ref 8–16)
ANION GAP SERPL CALC-SCNC: 9 MMOL/L (ref 8–16)
APTT BLDCRRT: 45.5 SEC (ref 21–32)
APTT BLDCRRT: 47.2 SEC (ref 21–32)
BASOPHILS # BLD AUTO: 0.07 K/UL (ref 0–0.2)
BASOPHILS NFR BLD: 0.9 % (ref 0–1.9)
BUN SERPL-MCNC: 12 MG/DL (ref 6–20)
BUN SERPL-MCNC: 12 MG/DL (ref 6–20)
CALCIUM SERPL-MCNC: 10 MG/DL (ref 8.7–10.5)
CALCIUM SERPL-MCNC: 9.7 MG/DL (ref 8.7–10.5)
CHLORIDE SERPL-SCNC: 105 MMOL/L (ref 95–110)
CHLORIDE SERPL-SCNC: 106 MMOL/L (ref 95–110)
CO2 SERPL-SCNC: 24 MMOL/L (ref 23–29)
CO2 SERPL-SCNC: 26 MMOL/L (ref 23–29)
CREAT SERPL-MCNC: 0.7 MG/DL (ref 0.5–1.4)
CREAT SERPL-MCNC: 0.8 MG/DL (ref 0.5–1.4)
DIFFERENTIAL METHOD: NORMAL
EOSINOPHIL # BLD AUTO: 0.3 K/UL (ref 0–0.5)
EOSINOPHIL NFR BLD: 3.8 % (ref 0–8)
ERYTHROCYTE [DISTWIDTH] IN BLOOD BY AUTOMATED COUNT: 12.6 % (ref 11.5–14.5)
ERYTHROCYTE [DISTWIDTH] IN BLOOD BY AUTOMATED COUNT: 12.8 % (ref 11.5–14.5)
EST. GFR  (AFRICAN AMERICAN): >60 ML/MIN/1.73 M^2
EST. GFR  (AFRICAN AMERICAN): >60 ML/MIN/1.73 M^2
EST. GFR  (NON AFRICAN AMERICAN): >60 ML/MIN/1.73 M^2
EST. GFR  (NON AFRICAN AMERICAN): >60 ML/MIN/1.73 M^2
GLUCOSE SERPL-MCNC: 103 MG/DL (ref 70–110)
GLUCOSE SERPL-MCNC: 98 MG/DL (ref 70–110)
HCT VFR BLD AUTO: 45 % (ref 40–54)
HCT VFR BLD AUTO: 46.3 % (ref 40–54)
HGB BLD-MCNC: 15 G/DL (ref 14–18)
HGB BLD-MCNC: 15.6 G/DL (ref 14–18)
IMM GRANULOCYTES # BLD AUTO: 0.01 K/UL (ref 0–0.04)
IMM GRANULOCYTES NFR BLD AUTO: 0.1 % (ref 0–0.5)
LYMPHOCYTES # BLD AUTO: 3.1 K/UL (ref 1–4.8)
LYMPHOCYTES NFR BLD: 39.5 % (ref 18–48)
MCH RBC QN AUTO: 29.8 PG (ref 27–31)
MCH RBC QN AUTO: 29.9 PG (ref 27–31)
MCHC RBC AUTO-ENTMCNC: 33.3 G/DL (ref 32–36)
MCHC RBC AUTO-ENTMCNC: 33.7 G/DL (ref 32–36)
MCV RBC AUTO: 89 FL (ref 82–98)
MCV RBC AUTO: 90 FL (ref 82–98)
MONOCYTES # BLD AUTO: 0.5 K/UL (ref 0.3–1)
MONOCYTES NFR BLD: 6.9 % (ref 4–15)
NEUTROPHILS # BLD AUTO: 3.8 K/UL (ref 1.8–7.7)
NEUTROPHILS NFR BLD: 48.8 % (ref 38–73)
NRBC BLD-RTO: 0 /100 WBC
PLATELET # BLD AUTO: 238 K/UL (ref 150–350)
PLATELET # BLD AUTO: 261 K/UL (ref 150–350)
PLATELET BLD QL SMEAR: NORMAL
PMV BLD AUTO: 10.6 FL (ref 9.2–12.9)
PMV BLD AUTO: 11.2 FL (ref 9.2–12.9)
POTASSIUM SERPL-SCNC: 3.8 MMOL/L (ref 3.5–5.1)
POTASSIUM SERPL-SCNC: 3.8 MMOL/L (ref 3.5–5.1)
RBC # BLD AUTO: 5.03 M/UL (ref 4.6–6.2)
RBC # BLD AUTO: 5.22 M/UL (ref 4.6–6.2)
SODIUM SERPL-SCNC: 138 MMOL/L (ref 136–145)
SODIUM SERPL-SCNC: 140 MMOL/L (ref 136–145)
WBC # BLD AUTO: 7.07 K/UL (ref 3.9–12.7)
WBC # BLD AUTO: 7.83 K/UL (ref 3.9–12.7)

## 2020-02-24 PROCEDURE — 25000003 PHARM REV CODE 250: Performed by: NURSE PRACTITIONER

## 2020-02-24 PROCEDURE — 99153 MOD SED SAME PHYS/QHP EA: CPT | Performed by: INTERNAL MEDICINE

## 2020-02-24 PROCEDURE — 99999 PR PBB SHADOW E&M-EST. PATIENT-LVL I: ICD-10-PCS | Mod: PBBFAC,,,

## 2020-02-24 PROCEDURE — 25000003 PHARM REV CODE 250: Performed by: INTERNAL MEDICINE

## 2020-02-24 PROCEDURE — 99999 PR PBB SHADOW E&M-EST. PATIENT-LVL I: CPT | Mod: PBBFAC,,,

## 2020-02-24 PROCEDURE — 63600175 PHARM REV CODE 636 W HCPCS: Performed by: EMERGENCY MEDICINE

## 2020-02-24 PROCEDURE — 75625 PR  ANGIO AORTOGRAM ABD SERIAL: ICD-10-PCS | Mod: 26,,, | Performed by: INTERNAL MEDICINE

## 2020-02-24 PROCEDURE — 75716 ARTERY X-RAYS ARMS/LEGS: CPT | Performed by: INTERNAL MEDICINE

## 2020-02-24 PROCEDURE — 99406 PT REFUSED TOBACCO CESSATION: ICD-10-PCS | Mod: S$GLB,,,

## 2020-02-24 PROCEDURE — 75716 ARTERY X-RAYS ARMS/LEGS: CPT | Mod: 26,,, | Performed by: INTERNAL MEDICINE

## 2020-02-24 PROCEDURE — 36246 INS CATH ABD/L-EXT ART 2ND: CPT | Mod: 50,,, | Performed by: INTERNAL MEDICINE

## 2020-02-24 PROCEDURE — 80048 BASIC METABOLIC PNL TOTAL CA: CPT

## 2020-02-24 PROCEDURE — 94761 N-INVAS EAR/PLS OXIMETRY MLT: CPT

## 2020-02-24 PROCEDURE — 63600175 PHARM REV CODE 636 W HCPCS: Performed by: NURSE PRACTITIONER

## 2020-02-24 PROCEDURE — 36246 INS CATH ABD/L-EXT ART 2ND: CPT | Mod: 50 | Performed by: INTERNAL MEDICINE

## 2020-02-24 PROCEDURE — C1769 GUIDE WIRE: HCPCS | Performed by: INTERNAL MEDICINE

## 2020-02-24 PROCEDURE — 75625 CONTRAST EXAM ABDOMINL AORTA: CPT | Mod: 26,,, | Performed by: INTERNAL MEDICINE

## 2020-02-24 PROCEDURE — 85027 COMPLETE CBC AUTOMATED: CPT

## 2020-02-24 PROCEDURE — 36415 COLL VENOUS BLD VENIPUNCTURE: CPT

## 2020-02-24 PROCEDURE — 25000003 PHARM REV CODE 250: Performed by: HOSPITALIST

## 2020-02-24 PROCEDURE — C1887 CATHETER, GUIDING: HCPCS | Performed by: INTERNAL MEDICINE

## 2020-02-24 PROCEDURE — 85730 THROMBOPLASTIN TIME PARTIAL: CPT | Mod: 91

## 2020-02-24 PROCEDURE — 11000001 HC ACUTE MED/SURG PRIVATE ROOM

## 2020-02-24 PROCEDURE — 36246 PR INS CATH ABD/L-EXT ART 2ND ORDER: ICD-10-PCS | Mod: 50,,, | Performed by: INTERNAL MEDICINE

## 2020-02-24 PROCEDURE — 99152 MOD SED SAME PHYS/QHP 5/>YRS: CPT | Performed by: INTERNAL MEDICINE

## 2020-02-24 PROCEDURE — 99406 BEHAV CHNG SMOKING 3-10 MIN: CPT | Mod: S$GLB,,,

## 2020-02-24 PROCEDURE — 99152 PR MOD CONSCIOUS SEDATION, SAME PHYS, 5+ YRS, FIRST 15 MIN: ICD-10-PCS | Mod: ,,, | Performed by: INTERNAL MEDICINE

## 2020-02-24 PROCEDURE — 75716 PR  ANGIO EXTERMITY BILAT: ICD-10-PCS | Mod: 26,,, | Performed by: INTERNAL MEDICINE

## 2020-02-24 PROCEDURE — 25500020 PHARM REV CODE 255: Performed by: INTERNAL MEDICINE

## 2020-02-24 PROCEDURE — 85025 COMPLETE CBC W/AUTO DIFF WBC: CPT

## 2020-02-24 PROCEDURE — 63600175 PHARM REV CODE 636 W HCPCS: Performed by: INTERNAL MEDICINE

## 2020-02-24 PROCEDURE — 80048 BASIC METABOLIC PNL TOTAL CA: CPT | Mod: 91

## 2020-02-24 PROCEDURE — 75625 CONTRAST EXAM ABDOMINL AORTA: CPT | Performed by: INTERNAL MEDICINE

## 2020-02-24 PROCEDURE — C1894 INTRO/SHEATH, NON-LASER: HCPCS | Performed by: INTERNAL MEDICINE

## 2020-02-24 PROCEDURE — 99152 MOD SED SAME PHYS/QHP 5/>YRS: CPT | Mod: ,,, | Performed by: INTERNAL MEDICINE

## 2020-02-24 RX ORDER — MIDAZOLAM HYDROCHLORIDE 1 MG/ML
INJECTION INTRAMUSCULAR; INTRAVENOUS
Status: DISCONTINUED | OUTPATIENT
Start: 2020-02-24 | End: 2020-02-24 | Stop reason: HOSPADM

## 2020-02-24 RX ORDER — IODIXANOL 320 MG/ML
INJECTION, SOLUTION INTRAVASCULAR
Status: DISCONTINUED | OUTPATIENT
Start: 2020-02-24 | End: 2020-02-24 | Stop reason: HOSPADM

## 2020-02-24 RX ORDER — HYDROCODONE BITARTRATE AND ACETAMINOPHEN 5; 325 MG/1; MG/1
1 TABLET ORAL EVERY 4 HOURS PRN
Status: DISCONTINUED | OUTPATIENT
Start: 2020-02-24 | End: 2020-02-27 | Stop reason: HOSPADM

## 2020-02-24 RX ORDER — VERAPAMIL HYDROCHLORIDE 2.5 MG/ML
INJECTION, SOLUTION INTRAVENOUS
Status: DISCONTINUED | OUTPATIENT
Start: 2020-02-24 | End: 2020-02-24 | Stop reason: HOSPADM

## 2020-02-24 RX ORDER — HEPARIN SODIUM 200 [USP'U]/100ML
INJECTION, SOLUTION INTRAVENOUS
Status: DISCONTINUED | OUTPATIENT
Start: 2020-02-24 | End: 2020-02-27 | Stop reason: HOSPADM

## 2020-02-24 RX ORDER — ONDANSETRON 2 MG/ML
4 INJECTION INTRAMUSCULAR; INTRAVENOUS EVERY 12 HOURS PRN
Status: DISCONTINUED | OUTPATIENT
Start: 2020-02-24 | End: 2020-02-27 | Stop reason: HOSPADM

## 2020-02-24 RX ORDER — LIDOCAINE HYDROCHLORIDE 20 MG/ML
INJECTION, SOLUTION INFILTRATION; PERINEURAL
Status: DISCONTINUED | OUTPATIENT
Start: 2020-02-24 | End: 2020-02-24 | Stop reason: HOSPADM

## 2020-02-24 RX ORDER — SODIUM CHLORIDE 9 MG/ML
INJECTION, SOLUTION INTRAVENOUS ONCE
Status: COMPLETED | OUTPATIENT
Start: 2020-02-24 | End: 2020-02-24

## 2020-02-24 RX ORDER — FENTANYL CITRATE 50 UG/ML
INJECTION, SOLUTION INTRAMUSCULAR; INTRAVENOUS
Status: DISCONTINUED | OUTPATIENT
Start: 2020-02-24 | End: 2020-02-24 | Stop reason: HOSPADM

## 2020-02-24 RX ORDER — DIPHENHYDRAMINE HYDROCHLORIDE 50 MG/ML
INJECTION INTRAMUSCULAR; INTRAVENOUS
Status: DISCONTINUED | OUTPATIENT
Start: 2020-02-24 | End: 2020-02-24 | Stop reason: HOSPADM

## 2020-02-24 RX ORDER — SODIUM CHLORIDE 9 MG/ML
INJECTION, SOLUTION INTRAVENOUS CONTINUOUS
Status: ACTIVE | OUTPATIENT
Start: 2020-02-24 | End: 2020-02-24

## 2020-02-24 RX ORDER — HEPARIN SODIUM 1000 [USP'U]/ML
INJECTION, SOLUTION INTRAVENOUS; SUBCUTANEOUS
Status: DISCONTINUED | OUTPATIENT
Start: 2020-02-24 | End: 2020-02-24 | Stop reason: HOSPADM

## 2020-02-24 RX ADMIN — HYDROCODONE BITARTRATE AND ACETAMINOPHEN 1 TABLET: 5; 325 TABLET ORAL at 11:02

## 2020-02-24 RX ADMIN — CLOPIDOGREL 75 MG: 75 TABLET, FILM COATED ORAL at 08:02

## 2020-02-24 RX ADMIN — SODIUM CHLORIDE: 0.9 INJECTION, SOLUTION INTRAVENOUS at 10:02

## 2020-02-24 RX ADMIN — LISINOPRIL 20 MG: 20 TABLET ORAL at 08:02

## 2020-02-24 RX ADMIN — HEPARIN SODIUM 14 UNITS/KG/HR: 10000 INJECTION, SOLUTION INTRAVENOUS at 06:02

## 2020-02-24 RX ADMIN — MORPHINE SULFATE 4 MG: 4 INJECTION INTRAVENOUS at 06:02

## 2020-02-24 RX ADMIN — ATORVASTATIN CALCIUM 80 MG: 40 TABLET, FILM COATED ORAL at 08:02

## 2020-02-24 RX ADMIN — MORPHINE SULFATE 4 MG: 4 INJECTION INTRAVENOUS at 10:02

## 2020-02-24 RX ADMIN — SODIUM CHLORIDE 1000 ML: 0.9 INJECTION, SOLUTION INTRAVENOUS at 09:02

## 2020-02-24 RX ADMIN — MORPHINE SULFATE 4 MG: 4 INJECTION INTRAVENOUS at 01:02

## 2020-02-24 RX ADMIN — MORPHINE SULFATE 4 MG: 4 INJECTION INTRAVENOUS at 02:02

## 2020-02-24 RX ADMIN — AMLODIPINE BESYLATE 10 MG: 5 TABLET ORAL at 08:02

## 2020-02-24 RX ADMIN — ASPIRIN 81 MG: 81 TABLET, COATED ORAL at 08:02

## 2020-02-24 RX ADMIN — HYDROCHLOROTHIAZIDE 12.5 MG: 12.5 TABLET ORAL at 06:02

## 2020-02-24 NOTE — PLAN OF CARE
Patient transferred to recovery cath lab slot 4 via stretcher with side rails up x2 .  Pt AAO X3 and able to follow commands. Pt is stable when connecting to cardiac monitors.  VSS. Right radial vasc band in place with 12ml of air in band c.d.i. no bleeding or hematoma noted. +2 kevan radial pulses palpated. +2 left pedal pulse.  Skin normal in color and warm to touch, <3 sec cap refill.  Fall risk precautions given and patient acknowledges.  AIDET completed to pt.  Will continue to monitor patient.

## 2020-02-24 NOTE — PLAN OF CARE
Patient transferred to floor on tele monitor.  VSS. No c/o pain.   Patient attached to tele monitor upon arrival in room 429.  Right radial with gauze/tegaderm CDI. No bleeding or hematoma noted. 2+ kevan radial pulses. 2+left post tib and pedal pulse.  2+right pop pulse.  site reviewed with JOSE France at bedside.  All questions answered.

## 2020-02-24 NOTE — PLAN OF CARE
2 mL of air removed from radial vasc band.  No hematoma or bleeding noted.  +2 kevan radial pulses palpated. Skin normal in color, warm to touch, < 3 sec cap refill.   Will continue to monitor pt.

## 2020-02-24 NOTE — ASSESSMENT & PLAN NOTE
Antiphospholipid antibody positive  History of right below knee amputation  Giving aspirin, clopidrogel, heparin infusion. Appreciate Cardiology. Intervention on 2/26/20.

## 2020-02-24 NOTE — SUBJECTIVE & OBJECTIVE
Interval History: Cardiology told him he will go back for intervention on Wednesday.    Review of Systems   Constitutional: Negative for chills and fever.   Gastrointestinal: Negative for nausea and vomiting.     Objective:     Vital Signs (Most Recent):  Temp: 97.1 °F (36.2 °C) (02/24/20 1543)  Pulse: 92 (02/24/20 1552)  Resp: 18 (02/24/20 1543)  BP: 139/79 (02/24/20 1543)  SpO2: 97 % (02/24/20 1319) Vital Signs (24h Range):  Temp:  [96.1 °F (35.6 °C)-98.8 °F (37.1 °C)] 97.1 °F (36.2 °C)  Pulse:  [] 92  Resp:  [12-20] 18  SpO2:  [93 %-97 %] 97 %  BP: (132-172)/() 139/79     Weight: 94.8 kg (209 lb)  Body mass index is 30.86 kg/m².    Intake/Output Summary (Last 24 hours) at 2/24/2020 1718  Last data filed at 2/24/2020 1600  Gross per 24 hour   Intake 125 ml   Output --   Net 125 ml      Physical Exam   Constitutional: He appears well-developed. No distress.   Pulmonary/Chest: Effort normal. No respiratory distress.   Neurological: He is alert.   Psychiatric: He has a normal mood and affect.   Nursing note and vitals reviewed.      Significant Labs: All pertinent labs within the past 24 hours have been reviewed.    Significant Imaging: I have reviewed all pertinent imaging results/findings within the past 24 hours.

## 2020-02-24 NOTE — PROGRESS NOTES
Smoking cessation education provided.  Pt states that he is ready to quit smoking.  Handouts provided for Ambulatory Smoking Cessation program and [id number].

## 2020-02-24 NOTE — PLAN OF CARE
Problem: Wound  Goal: Optimal Wound Healing  Outcome: Ongoing, Progressing     Problem: Fall Injury Risk  Goal: Absence of Fall and Fall-Related Injury  Outcome: Ongoing, Progressing     Problem: Pain Chronic (Persistent) (Comorbidity Management)  Goal: Acceptable Pain Control and Functional Ability  Outcome: Ongoing, Progressing

## 2020-02-24 NOTE — PLAN OF CARE
Remaining air removed from right radial vasc band. Gauze and tegaderm dressing applied c.d.i.   No drainage or shadowing noted. No bleeding or hematoma noted around site. +2 kevan radial pulses palpated. Skin normal in color, warm to touch, < 3 sec cap refill.   Pt tolerated well.  Will continue to monitor pt.

## 2020-02-24 NOTE — PROGRESS NOTES
"[facility]  Progress Note    Patient Name: Ren Grove  MRN: [id number]  Patient Class: IP- Inpatient   Admission Date: 2/20/2020  Length of Stay: 3 days  Attending Physician: Andrés Fuentes MD  Primary Care Provider: Nomi Bermeo MD        Subjective:     Principal Problem:Critical lower limb ischemia        HPI:  Ren Grove is a 39 y.o. 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 He lives in [place], [place]. His primary care physician is Dr. Nomi Bermeo.   He had been hospitalized at [facility] a year prior (24/19 to 2/15/19) for right popliteal artery stenosis and was prescribed warfarin.    He presented to [facility] Emergency Department on 2/20/20 with acute pain, swelling, and redness of his right leg stump. His prosthesis was not fitting correctly. He noted a dusky appearance and coolness to touch. He reported that he had stopped taking warfarin, saying "I did not like them" and that his primary doctor and the person who made his prosthetic thought that aspirin was probably enough. Ultrasound suggested deep femoral artery stenosis and occlusion of the distal superficial femoral artery and popliteal artery. Interventional Cardiology was contacted and recommended loading clopidrogel, giving aspirin and heparin infusion. He was admitted to [facility].    Overview/Hospital Course:  Cardiology recommended continuing the aspirin, clopidrogel, and heparin. He underwent right lower extremity peripheral angiogram on 2/24/20 finding a 99% stenotic lesion of the proximal right superficial femoral " artery, 95% stenotic lesion of the mid to distal superficial femoral artery, a 100% stenotic lesion of the distal superficial femoral artery, and a 100% stenotic lesion of the proximal to mid popliteal artery.    Interval History: Cardiology told him he will go back for intervention on Wednesday.    Review of Systems   Constitutional: Negative for chills and fever.   Gastrointestinal: Negative for nausea and vomiting.     Objective:     Vital Signs (Most Recent):  Temp: 97.1 °F (36.2 °C) (02/24/20 1543)  Pulse: 92 (02/24/20 1552)  Resp: 18 (02/24/20 1543)  BP: 139/79 (02/24/20 1543)  SpO2: 97 % (02/24/20 1319) Vital Signs (24h Range):  Temp:  [96.1 °F (35.6 °C)-98.8 °F (37.1 °C)] 97.1 °F (36.2 °C)  Pulse:  [] 92  Resp:  [12-20] 18  SpO2:  [93 %-97 %] 97 %  BP: (132-172)/() 139/79     Weight: 94.8 kg (209 lb)  Body mass index is 30.86 kg/m².    Intake/Output Summary (Last 24 hours) at 2/24/2020 1718  Last data filed at 2/24/2020 1600  Gross per 24 hour   Intake 125 ml   Output --   Net 125 ml      Physical Exam   Constitutional: He appears well-developed. No distress.   Pulmonary/Chest: Effort normal. No respiratory distress.   Neurological: He is alert.   Psychiatric: He has a normal mood and affect.   Nursing note and vitals reviewed.      Significant Labs: All pertinent labs within the past 24 hours have been reviewed.    Significant Imaging: I have reviewed all pertinent imaging results/findings within the past 24 hours.       Assessment/Plan:      * Critical lower limb ischemia  Antiphospholipid antibody positive  History of right below knee amputation  Giving aspirin, clopidrogel, heparin infusion. Appreciate Cardiology. Intervention on 2/26/20.    Essential hypertension  Hyperlipidemia   Continue home aspirin, amlodipine, lisinopril-hydrochlorothiazide, atorvastatin.       VTE Risk Mitigation (From admission, onward)         Ordered     heparin infusion 1,000 units/500 ml in 0.9% NaCl (pressure  line flush)  Intra-op continuous PRN      02/24/20 0949     IP VTE HIGH RISK PATIENT  Once      02/21/20 0233     Reason for no Mechanical VTE Prophylaxis  Once     Question:  Reasons:  Answer:  Physician Provided (leave comment)  Comment:  on heparin gtt     02/21/20 0233     heparin 25,000 units in dextrose 5% 250 mL (100 units/mL) infusion HIGH INTENSITY nomogram - [facility]  Continuous     Question:  Heparin Infusion Adjustment (DO NOT MODIFY ANSWER)  Answer:  [email or web address]\epic\Images\Pharmacy\HeparinInfusions\heparin HIGH INTENSITY nomogram for [facility] [id number].pdf    02/20/20 2256     heparin 25,000 units in dextrose 5% (100 units/ml) IV bolus from bag - ADDITIONAL PRN BOLUS - 60 units/kg  As needed (PRN)     Question:  Heparin Infusion Adjustment (DO NOT MODIFY ANSWER)  Answer:  [email or web address]\epic\Images\Pharmacy\HeparinInfusions\heparin HIGH INTENSITY nomogram for [facility] [id number].pdf    02/20/20 2256     heparin 25,000 units in dextrose 5% (100 units/ml) IV bolus from bag - ADDITIONAL PRN BOLUS - 30 units/kg  As needed (PRN)     Question:  Heparin Infusion Adjustment (DO NOT MODIFY ANSWER)  Answer:  [email or web address]\epic\Images\Pharmacy\HeparinInfusions\heparin HIGH INTENSITY nomogram for [facility] [id number].pdf    02/20/20 2256                      Andrés Fuentes MD  Department of Hospital Medicine   [facility]

## 2020-02-24 NOTE — PLAN OF CARE
Pt resting quietly in bed; pt has mottling right thigh and an aud popliteal pulse noted w/ doppler; skin wm dry color pk; pt has iv access lt forearm; npo maintained; pt reports pain in rt thigh; pt has id band yellow band and fall sock left foot; left foot has palpable pulses; radials 2 + palpable bilat; preop and introp teaching done and pt verbalizes understanding

## 2020-02-25 LAB — APTT BLDCRRT: 52.8 SEC (ref 21–32)

## 2020-02-25 PROCEDURE — 25000003 PHARM REV CODE 250: Performed by: HOSPITALIST

## 2020-02-25 PROCEDURE — 94761 N-INVAS EAR/PLS OXIMETRY MLT: CPT

## 2020-02-25 PROCEDURE — 36415 COLL VENOUS BLD VENIPUNCTURE: CPT

## 2020-02-25 PROCEDURE — 99233 PR SUBSEQUENT HOSPITAL CARE,LEVL III: ICD-10-PCS | Mod: ,,, | Performed by: INTERNAL MEDICINE

## 2020-02-25 PROCEDURE — 99233 SBSQ HOSP IP/OBS HIGH 50: CPT | Mod: ,,, | Performed by: INTERNAL MEDICINE

## 2020-02-25 PROCEDURE — 25000003 PHARM REV CODE 250: Performed by: INTERNAL MEDICINE

## 2020-02-25 PROCEDURE — 25000003 PHARM REV CODE 250: Performed by: NURSE PRACTITIONER

## 2020-02-25 PROCEDURE — 63600175 PHARM REV CODE 636 W HCPCS: Performed by: NURSE PRACTITIONER

## 2020-02-25 PROCEDURE — 85730 THROMBOPLASTIN TIME PARTIAL: CPT

## 2020-02-25 PROCEDURE — 11000001 HC ACUTE MED/SURG PRIVATE ROOM

## 2020-02-25 RX ORDER — DOCUSATE SODIUM 100 MG/1
100 CAPSULE, LIQUID FILLED ORAL DAILY
Status: DISCONTINUED | OUTPATIENT
Start: 2020-02-25 | End: 2020-02-27 | Stop reason: HOSPADM

## 2020-02-25 RX ORDER — SODIUM CHLORIDE 9 MG/ML
INJECTION, SOLUTION INTRAVENOUS ONCE
Status: CANCELLED | OUTPATIENT
Start: 2020-02-25 | End: 2020-02-25

## 2020-02-25 RX ADMIN — ATORVASTATIN CALCIUM 80 MG: 40 TABLET, FILM COATED ORAL at 08:02

## 2020-02-25 RX ADMIN — LISINOPRIL 20 MG: 20 TABLET ORAL at 08:02

## 2020-02-25 RX ADMIN — MORPHINE SULFATE 4 MG: 4 INJECTION INTRAVENOUS at 04:02

## 2020-02-25 RX ADMIN — HEPARIN SODIUM 14.04 UNITS/KG/HR: 10000 INJECTION, SOLUTION INTRAVENOUS at 05:02

## 2020-02-25 RX ADMIN — MORPHINE SULFATE 4 MG: 4 INJECTION INTRAVENOUS at 02:02

## 2020-02-25 RX ADMIN — MORPHINE SULFATE 4 MG: 4 INJECTION INTRAVENOUS at 11:02

## 2020-02-25 RX ADMIN — CLOPIDOGREL 75 MG: 75 TABLET, FILM COATED ORAL at 08:02

## 2020-02-25 RX ADMIN — AMLODIPINE BESYLATE 10 MG: 5 TABLET ORAL at 08:02

## 2020-02-25 RX ADMIN — HYDROCODONE BITARTRATE AND ACETAMINOPHEN 1 TABLET: 5; 325 TABLET ORAL at 12:02

## 2020-02-25 RX ADMIN — HYDROCHLOROTHIAZIDE 12.5 MG: 12.5 TABLET ORAL at 08:02

## 2020-02-25 RX ADMIN — MORPHINE SULFATE 4 MG: 4 INJECTION INTRAVENOUS at 08:02

## 2020-02-25 RX ADMIN — MORPHINE SULFATE 4 MG: 4 INJECTION INTRAVENOUS at 07:02

## 2020-02-25 RX ADMIN — DOCUSATE SODIUM 100 MG: 100 CAPSULE, LIQUID FILLED ORAL at 02:02

## 2020-02-25 RX ADMIN — HYDROCODONE BITARTRATE AND ACETAMINOPHEN 1 TABLET: 5; 325 TABLET ORAL at 11:02

## 2020-02-25 RX ADMIN — ASPIRIN 81 MG: 81 TABLET, COATED ORAL at 08:02

## 2020-02-25 RX ADMIN — HYDROCODONE BITARTRATE AND ACETAMINOPHEN 1 TABLET: 5; 325 TABLET ORAL at 05:02

## 2020-02-25 NOTE — PLAN OF CARE
Received patient upon rounds at 1930H, patient seen in bed on semi-dunn's position.Wife at bedside. Conscious , coherent to time, date, place, person and situation. GCS 15.Bed alarm noted to be off, alarm set ON, but patient strongly refused bed alarm, fall risk scale of (16). Patient uses crutches and has right leg prosthesis. Patient verbalized I don't need the alarm, my wife is here if I need to move. Education given regarding risk for fall, still patient strongly refused. Charge Nurse NITZA Prasad, and NP David updated. Advised patient to call for any assistance. With saline lock gauge 20 left forearm, with ongoing heparin drip at 14 units/kg/hr infusing well.  With subjective complaint of right leg pain, 9/10 moderately relieved with norco and morphine IV.Right BKA stump  pinkish to red  in color, warm to touch.Right radial post angio site with clean and dry dressing, no hematoma, no edema on the site. Advised patient to call for any assistance. Telemetry Normal sinus rhythm 80's.Oxygen saturation 94% on room air.Call bell within reach. Visited at regular intervals.Safety fall precaution maintained.Will continue to monitor patient.  0631H- Patient stable VS over the night, afebrile. No untoward signs and symptoms noted over the night. Still refusing for bed alarm. Telemetry no ectopy. Free from fall. IV line patent.Heparin ongoing at 14units /kg/hr therapeutic at 52.8 aptt this morning. Next Aptt for tomorrow morning.  Will endorse patient to day shift Nurse.

## 2020-02-25 NOTE — SUBJECTIVE & OBJECTIVE
Interval History: Cardiology told him he will go back for intervention on Wednesday.    Review of Systems   Constitutional: Negative for chills and fever.   HENT: Negative for trouble swallowing.    Eyes: Negative for visual disturbance.   Respiratory: Negative for cough, shortness of breath and wheezing.    Cardiovascular: Negative for chest pain and palpitations.   Gastrointestinal: Negative for nausea and vomiting.   Genitourinary: Negative for difficulty urinating and hematuria.   Musculoskeletal: Positive for gait problem and myalgias.   Skin: Positive for color change.   Neurological: Negative for dizziness, light-headedness and numbness.   Hematological: Does not bruise/bleed easily.   Psychiatric/Behavioral: Negative for agitation, confusion and hallucinations.     Objective:     Vital Signs (Most Recent):  Temp: 97.6 °F (36.4 °C) (02/25/20 1150)  Pulse: 88 (02/25/20 1150)  Resp: 18 (02/25/20 1150)  BP: 112/62 (02/25/20 1150)  SpO2: 95 % (02/25/20 1150) Vital Signs (24h Range):  Temp:  [97.1 °F (36.2 °C)-98.2 °F (36.8 °C)] 97.6 °F (36.4 °C)  Pulse:  [] 88  Resp:  [18] 18  SpO2:  [92 %-95 %] 95 %  BP: (101-159)/(59-96) 112/62     Weight: 94.8 kg (209 lb)  Body mass index is 30.86 kg/m².    Intake/Output Summary (Last 24 hours) at 2/25/2020 1319  Last data filed at 2/25/2020 0030  Gross per 24 hour   Intake 1503.7 ml   Output --   Net 1503.7 ml      Physical Exam   Constitutional: He appears well-developed. No distress.   HENT:   Head: Normocephalic and atraumatic.   Eyes: Pupils are equal, round, and reactive to light.   Neck: Normal range of motion. No JVD present.   Cardiovascular: Normal rate and intact distal pulses.   Pulmonary/Chest: Effort normal. No respiratory distress.   Abdominal: Soft. Bowel sounds are normal. He exhibits no distension. There is no tenderness. There is no guarding.   Musculoskeletal: He exhibits tenderness (LLE) and deformity (Left BKA).   Neurological: He is alert.   Skin:  Capillary refill takes 2 to 3 seconds. There is erythema (LLE).   Psychiatric: He has a normal mood and affect. His behavior is normal.   Nursing note and vitals reviewed.      Significant Labs: All pertinent labs within the past 24 hours have been reviewed.    Significant Imaging: I have reviewed all pertinent imaging results/findings within the past 24 hours.

## 2020-02-25 NOTE — NURSING
VN Round: Cued into patients room and adjusted camera with patients permission. Introduced myself as VN and explained I would be working with the bedside nurse. Bed low, locked and call bell within reach. Patient verbalized understanding to call for any needs or assistance. Patient complains of pain 5/10 at  The moment, prn pain medication administered at 0857.  No questions at this time. Will continue to monitor patient.

## 2020-02-25 NOTE — PROGRESS NOTES
"[facility]  Progress Note    Patient Name: Ren Grove  MRN: [id number]  Patient Class: IP- Inpatient   Admission Date: 2/20/2020  Length of Stay: 4 days  Attending Physician: Clara Hanson*  Primary Care Provider: Nomi Bermeo MD        Subjective:     Principal Problem:Critical lower limb ischemia        HPI:  Ren Grove is a 39 y.o. 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 He lives in [place], [place]. His primary care physician is Dr. Nomi Bermeo.   He had been hospitalized at [facility] a year prior (24/19 to 2/15/19) for right popliteal artery stenosis and was prescribed warfarin.    He presented to [facility] Emergency Department on 2/20/20 with acute pain, swelling, and redness of his right leg stump. His prosthesis was not fitting correctly. He noted a dusky appearance and coolness to touch. He reported that he had stopped taking warfarin, saying "I did not like them" and that his primary doctor and the person who made his prosthetic thought that aspirin was probably enough. Ultrasound suggested deep femoral artery stenosis and occlusion of the distal superficial femoral artery and popliteal artery. Interventional Cardiology was contacted and recommended loading clopidrogel, giving aspirin and heparin infusion. He was admitted to [facility].    Overview/Hospital Course:  Cardiology recommended continuing the aspirin, clopidrogel, and heparin. He underwent right lower extremity peripheral angiogram on 2/24/20 finding a 99% stenotic lesion of the proximal right superficial " femoral artery, 95% stenotic lesion of the mid to distal superficial femoral artery, a 100% stenotic lesion of the distal superficial femoral artery, and a 100% stenotic lesion of the proximal to mid popliteal artery.    Interval History: Cardiology told him he will go back for intervention on Wednesday.    Review of Systems   Constitutional: Negative for chills and fever.   HENT: Negative for trouble swallowing.    Eyes: Negative for visual disturbance.   Respiratory: Negative for cough, shortness of breath and wheezing.    Cardiovascular: Negative for chest pain and palpitations.   Gastrointestinal: Negative for nausea and vomiting.   Genitourinary: Negative for difficulty urinating and hematuria.   Musculoskeletal: Positive for gait problem and myalgias.   Skin: Positive for color change.   Neurological: Negative for dizziness, light-headedness and numbness.   Hematological: Does not bruise/bleed easily.   Psychiatric/Behavioral: Negative for agitation, confusion and hallucinations.     Objective:     Vital Signs (Most Recent):  Temp: 97.6 °F (36.4 °C) (02/25/20 1150)  Pulse: 88 (02/25/20 1150)  Resp: 18 (02/25/20 1150)  BP: 112/62 (02/25/20 1150)  SpO2: 95 % (02/25/20 1150) Vital Signs (24h Range):  Temp:  [97.1 °F (36.2 °C)-98.2 °F (36.8 °C)] 97.6 °F (36.4 °C)  Pulse:  [] 88  Resp:  [18] 18  SpO2:  [92 %-95 %] 95 %  BP: (101-159)/(59-96) 112/62     Weight: 94.8 kg (209 lb)  Body mass index is 30.86 kg/m².    Intake/Output Summary (Last 24 hours) at 2/25/2020 1319  Last data filed at 2/25/2020 0030  Gross per 24 hour   Intake 1503.7 ml   Output --   Net 1503.7 ml      Physical Exam   Constitutional: He appears well-developed. No distress.   HENT:   Head: Normocephalic and atraumatic.   Eyes: Pupils are equal, round, and reactive to light.   Neck: Normal range of motion. No JVD present.   Cardiovascular: Normal rate and intact distal pulses.   Pulmonary/Chest: Effort normal. No respiratory distress.    Abdominal: Soft. Bowel sounds are normal. He exhibits no distension. There is no tenderness. There is no guarding.   Musculoskeletal: He exhibits tenderness (RLE) and deformity (Right BKA).   Neurological: He is alert.   Skin: Capillary refill takes 2 to 3 seconds. There is erythema (RLE).   Psychiatric: He has a normal mood and affect. His behavior is normal.   Nursing note and vitals reviewed.      Significant Labs: All pertinent labs within the past 24 hours have been reviewed.    Significant Imaging: I have reviewed all pertinent imaging results/findings within the past 24 hours.       Assessment/Plan:      * Critical lower limb ischemia  Antiphospholipid antibody positive  History of right below knee amputation  Appreciate Cardiology. Continue heparin gtt, aspirin 81 mg daily, clopidogrel 75 mg daily, atorvastatin to 80 mg daily. He will be NPO after midnight tonight for planned revascularization tomorrow.    Essential hypertension  Hyperlipidemia   Continue home aspirin, amlodipine, lisinopril-hydrochlorothiazide, atorvastatin.       VTE Risk Mitigation (From admission, onward)         Ordered     heparin infusion 1,000 units/500 ml in 0.9% NaCl (pressure line flush)  Intra-op continuous PRN      02/24/20 0949     IP VTE HIGH RISK PATIENT  Once      02/21/20 0233     Reason for no Mechanical VTE Prophylaxis  Once     Question:  Reasons:  Answer:  Physician Provided (leave comment)  Comment:  on heparin gtt     02/21/20 0233     heparin 25,000 units in dextrose 5% 250 mL (100 units/mL) infusion HIGH INTENSITY nomogram - [facility]  Continuous     Question:  Heparin Infusion Adjustment (DO NOT MODIFY ANSWER)  Answer:  [email or web address]\epic\Images\Pharmacy\HeparinInfusions\heparin HIGH INTENSITY nomogram for [facility] [id number].pdf    02/20/20 7462     heparin 25,000 units in dextrose 5% (100 units/ml) IV bolus from bag - ADDITIONAL PRN BOLUS - 60 units/kg  As needed (PRN)     Question:  Heparin Infusion Adjustment (DO NOT MODIFY  ANSWER)  Answer:  [email or web address]\epic\Images\Pharmacy\HeparinInfusions\heparin HIGH INTENSITY nomogram for [facility] [id number].pdf    02/20/20 0570     heparin 25,000 units in dextrose 5% (100 units/ml) IV bolus from bag - ADDITIONAL PRN BOLUS - 30 units/kg  As needed (PRN)     Question:  Heparin Infusion Adjustment (DO NOT MODIFY ANSWER)  Answer:  [email or web address]\epic\Images\Pharmacy\HeparinInfusions\heparin HIGH INTENSITY nomogram for [facility] [id number].pdf    02/20/20 7269                      Keke Bates NP  Department of Hospital Medicine   [facility]

## 2020-02-25 NOTE — ASSESSMENT & PLAN NOTE
Antiphospholipid antibody positive  History of right below knee amputation  Appreciate Cardiology. Continue heparin gtt, aspirin 81 mg daily, clopidogrel 75 mg daily, atorvastatin to 80 mg daily. He will be NPO after midnight tonight for planned revascularization tomorrow.

## 2020-02-25 NOTE — PROGRESS NOTES
[facility]  Cardiology  Progress Note    Patient Name: Ren Grove  MRN: [id number]  Admission Date: 2/20/2020  Hospital Length of Stay: 4 days  Code Status: Full Code   Attending Physician: Clara Hanson*   Primary Care Physician: Nomi Bermeo MD  Expected Discharge Date:   Principal Problem:Critical lower limb ischemia    Subjective:     Hospital Course:   02/21/2020 RLE discoloration which demarcates at the proximal level of his prosthesis noted. Stump noted warm to touch. Pain persists. SBP elevated- medications adjusted. Heparin gtt continued.   2/22/20: Continues to have right leg soreness. Denies any worsening symptoms. Continued hep gtt  2/23/20: Noted to have increased swelling in the right leg stump and felt cooler overnight. Better today. Soreness continues (no worsening)  2/25/20: peripheral angiogram yesterday with SFA and POP lesions. Discussed with Dr Good. Planned for revascularization tomorrow. Soreness continues (no worsening). Popliteal pulses positive by Doppler    Review of Systems   Constitution: Negative for diaphoresis and fever.   Cardiovascular: Positive for leg swelling. Negative for chest pain, dyspnea on exertion, irregular heartbeat, near-syncope, orthopnea, palpitations, paroxysmal nocturnal dyspnea and syncope.   Respiratory: Negative for cough, shortness of breath, sputum production and wheezing.    Hematologic/Lymphatic: Negative for bleeding problem. Does not bruise/bleed easily.   Skin: Positive for color change.   Musculoskeletal: Positive for myalgias.   Gastrointestinal: Negative for hematemesis, hematochezia, melena, nausea and vomiting.   Neurological: Negative for dizziness and light-headedness.     Objective:     Vital Signs (Most Recent):  Temp: 98.2 °F (36.8 °C) (02/25/20 0729)  Pulse: 81 (02/25/20 0729)  Resp: 18 (02/25/20 0729)  BP: 128/75 (02/25/20 0729)  SpO2: (!) 94 % (02/25/20 0827) Vital Signs (24h Range):  Temp:  [97.1 °F  (36.2 °C)-98.2 °F (36.8 °C)] 98.2 °F (36.8 °C)  Pulse:  [] 81  Resp:  [12-18] 18  SpO2:  [92 %-97 %] 94 %  BP: (101-159)/(59-96) 128/75     Weight: 94.8 kg (209 lb)  Body mass index is 30.86 kg/m².     SpO2: (!) 94 %  O2 Device (Oxygen Therapy): room air      Intake/Output Summary (Last 24 hours) at 2/25/2020 1052  Last data filed at 2/25/2020 0030  Gross per 24 hour   Intake 1503.7 ml   Output --   Net 1503.7 ml       Lines/Drains/Airways     Peripheral Intravenous Line                 Peripheral IV - Single Lumen 02/24/20 0920 20 G Left Forearm 1 day                Physical Exam   Constitutional: He appears well-developed. No distress.   Neck: No JVD present.   Cardiovascular: Normal rate and regular rhythm. Exam reveals no gallop and no friction rub.   No murmur heard.  Pulmonary/Chest: Effort normal. No respiratory distress.   Abdominal: Soft. Bowel sounds are normal. There is no tenderness.   Musculoskeletal: He exhibits edema (Right thigh and stump).   Right knee and stump warm to touch, tender   Neurological: He is alert.   Skin: Skin is warm. He is not diaphoretic.   Psychiatric: He has a normal mood and affect.       Significant Labs:   CMP   Recent Labs   Lab 02/24/20  0613 02/24/20  0910    140   K 3.8 3.8    105   CO2 24 26   GLU 98 103   BUN 12 12   CREATININE 0.7 0.8   CALCIUM 9.7 10.0   ANIONGAP 8 9   ESTGFRAFRICA >60 >60   EGFRNONAA >60 >60   , CBC   Recent Labs   Lab 02/24/20  0613 02/24/20  0910   WBC 7.07 7.83   HGB 15.0 15.6   HCT 45.0 46.3    261     Assessment and Plan:     40 yo male h/o right BKA following DVT 2012, compartment syndrome left leg sp fasciotomy 2015, HTN, active tobacco user     Presented 2/20/20 for acute onset pain and swelling right leg stump, starting between noon and 2 pm, in the setting of discontinuing coumadin a few months back, and continued aspirin 81 mg daily. Ultrasound RLE suggestive of DFA stenosis. Occlusion of distal SFA and pop  arteries.      Right leg stump continues to be warm to touch, compared to on presentation. Continues to report soreness in the stump. Popliteal pulses positive by Doppler.    Peripheral angiogram 2/25/20: 99% stenotic lesion of the proximal right superficial femoral artery, 95% stenotic lesion of the mid to distal superficial femoral artery, a 100% stenotic lesion of the distal superficial femoral artery, and a 100% stenotic lesion of the proximal to mid popliteal artery.     Continue heparin gtt, aspirin 81 mg daily, clopidogrel 75 mg daily, atorvastatin to 80 mg daily  NPO after midnight tonight for planned revascularization tomorrow     HTN  Lisinopril 20 mg daily, HCTZ 12.5 mg daily, amlodipine 10 mg daily.     Tobacco use - cessation referral        VTE Risk Mitigation (From admission, onward)         Ordered     heparin infusion 1,000 units/500 ml in 0.9% NaCl (pressure line flush)  Intra-op continuous PRN      02/24/20 0949     IP VTE HIGH RISK PATIENT  Once      02/21/20 0233     Reason for no Mechanical VTE Prophylaxis  Once     Question:  Reasons:  Answer:  Physician Provided (leave comment)  Comment:  on heparin gtt     02/21/20 0233     heparin 25,000 units in dextrose 5% 250 mL (100 units/mL) infusion HIGH INTENSITY nomogram - [facility]  Continuous     Question:  Heparin Infusion Adjustment (DO NOT MODIFY ANSWER)  Answer:  \[email or web address]\epic\Images\Pharmacy\HeparinInfusions\heparin HIGH INTENSITY nomogram for [facility] [id number].pdf    02/20/20 2256     heparin 25,000 units in dextrose 5% (100 units/ml) IV bolus from bag - ADDITIONAL PRN BOLUS - 60 units/kg  As needed (PRN)     Question:  Heparin Infusion Adjustment (DO NOT MODIFY ANSWER)  Answer:  \[email or web address]\epic\Images\Pharmacy\HeparinInfusions\heparin HIGH INTENSITY nomogram for [facility] [id number].pdf    02/20/20 2256     heparin 25,000 units in dextrose 5% (100 units/ml) IV bolus from bag - ADDITIONAL PRN BOLUS - 30 units/kg  As needed (PRN)     Question:  Heparin  Infusion Adjustment (DO NOT MODIFY ANSWER)  Answer:  \\[email or web address]\epic\Images\Pharmacy\HeparinInfusions\heparin HIGH INTENSITY nomogram for [facility] [id number].pdf    02/20/20 5519                Sarah Russell MD  Cardiology  [facility]

## 2020-02-25 NOTE — SUBJECTIVE & OBJECTIVE
Review of Systems   Constitution: Negative for diaphoresis and fever.   Cardiovascular: Positive for leg swelling. Negative for chest pain, dyspnea on exertion, irregular heartbeat, near-syncope, orthopnea, palpitations, paroxysmal nocturnal dyspnea and syncope.   Respiratory: Negative for cough, shortness of breath, sputum production and wheezing.    Hematologic/Lymphatic: Negative for bleeding problem. Does not bruise/bleed easily.   Skin: Positive for color change.   Musculoskeletal: Positive for myalgias.   Gastrointestinal: Negative for hematemesis, hematochezia, melena, nausea and vomiting.   Neurological: Negative for dizziness and light-headedness.     Objective:     Vital Signs (Most Recent):  Temp: 98.2 °F (36.8 °C) (02/25/20 0729)  Pulse: 81 (02/25/20 0729)  Resp: 18 (02/25/20 0729)  BP: 128/75 (02/25/20 0729)  SpO2: (!) 94 % (02/25/20 0827) Vital Signs (24h Range):  Temp:  [97.1 °F (36.2 °C)-98.2 °F (36.8 °C)] 98.2 °F (36.8 °C)  Pulse:  [] 81  Resp:  [12-18] 18  SpO2:  [92 %-97 %] 94 %  BP: (101-159)/(59-96) 128/75     Weight: 94.8 kg (209 lb)  Body mass index is 30.86 kg/m².     SpO2: (!) 94 %  O2 Device (Oxygen Therapy): room air      Intake/Output Summary (Last 24 hours) at 2/25/2020 1052  Last data filed at 2/25/2020 0030  Gross per 24 hour   Intake 1503.7 ml   Output --   Net 1503.7 ml       Lines/Drains/Airways     Peripheral Intravenous Line                 Peripheral IV - Single Lumen 02/24/20 0920 20 G Left Forearm 1 day                Physical Exam   Constitutional: He appears well-developed. No distress.   Neck: No JVD present.   Cardiovascular: Normal rate and regular rhythm. Exam reveals no gallop and no friction rub.   No murmur heard.  Pulmonary/Chest: Effort normal. No respiratory distress.   Abdominal: Soft. Bowel sounds are normal. There is no tenderness.   Musculoskeletal: He exhibits edema (Right thigh and stump).   Right knee and stump warm to touch, tender   Neurological: He  is alert.   Skin: Skin is warm. He is not diaphoretic.   Psychiatric: He has a normal mood and affect.       Significant Labs:   CMP   Recent Labs   Lab 02/24/20  0613 02/24/20  0910    140   K 3.8 3.8    105   CO2 24 26   GLU 98 103   BUN 12 12   CREATININE 0.7 0.8   CALCIUM 9.7 10.0   ANIONGAP 8 9   ESTGFRAFRICA >60 >60   EGFRNONAA >60 >60   , CBC   Recent Labs   Lab 02/24/20  0613 02/24/20  0910   WBC 7.07 7.83   HGB 15.0 15.6   HCT 45.0 46.3    261

## 2020-02-25 NOTE — PLAN OF CARE
For 6:45 to 19:45 shift 02/24/2020:  Pt right wrist mildly swollen with mild discomfort with movement   capillary refill remains brisk   pt aware may have repeated procedure on Wednesday   pt aware to protect arm /hand from any weight bearing , only limited use for 3 days   heparin resumed at previous rate before procedure   ptt to be done at 8 pm   telemetry cont as documented

## 2020-02-26 LAB
ANION GAP SERPL CALC-SCNC: 10 MMOL/L (ref 8–16)
APTT BLDCRRT: 50.9 SEC (ref 21–32)
BUN SERPL-MCNC: 9 MG/DL (ref 6–20)
CALCIUM SERPL-MCNC: 9.4 MG/DL (ref 8.7–10.5)
CHLORIDE SERPL-SCNC: 105 MMOL/L (ref 95–110)
CO2 SERPL-SCNC: 24 MMOL/L (ref 23–29)
CREAT SERPL-MCNC: 0.8 MG/DL (ref 0.5–1.4)
EST. GFR  (AFRICAN AMERICAN): >60 ML/MIN/1.73 M^2
EST. GFR  (NON AFRICAN AMERICAN): >60 ML/MIN/1.73 M^2
GLUCOSE SERPL-MCNC: 103 MG/DL (ref 70–110)
POC ACTIVATED CLOTTING TIME K: 208 SEC (ref 74–137)
POC ACTIVATED CLOTTING TIME K: 213 SEC (ref 74–137)
POC ACTIVATED CLOTTING TIME K: 263 SEC (ref 74–137)
POC ACTIVATED CLOTTING TIME K: 301 SEC (ref 74–137)
POC ACTIVATED CLOTTING TIME K: 318 SEC (ref 74–137)
POTASSIUM SERPL-SCNC: 3.5 MMOL/L (ref 3.5–5.1)
SAMPLE: ABNORMAL
SODIUM SERPL-SCNC: 139 MMOL/L (ref 136–145)

## 2020-02-26 PROCEDURE — 37252 PR IVUS, NON-CORONARY, 1ST VESSEL: ICD-10-PCS | Mod: RT,,, | Performed by: INTERNAL MEDICINE

## 2020-02-26 PROCEDURE — 99152 MOD SED SAME PHYS/QHP 5/>YRS: CPT | Mod: ,,, | Performed by: INTERNAL MEDICINE

## 2020-02-26 PROCEDURE — 63600175 PHARM REV CODE 636 W HCPCS: Performed by: INTERNAL MEDICINE

## 2020-02-26 PROCEDURE — 37224 PR FEM/POPL REVAS W/TLA: ICD-10-PCS | Mod: RT,,, | Performed by: INTERNAL MEDICINE

## 2020-02-26 PROCEDURE — 25000003 PHARM REV CODE 250: Performed by: NURSE PRACTITIONER

## 2020-02-26 PROCEDURE — 75710 ARTERY X-RAYS ARM/LEG: CPT | Mod: 59,RT | Performed by: INTERNAL MEDICINE

## 2020-02-26 PROCEDURE — C1753 CATH, INTRAVAS ULTRASOUND: HCPCS | Performed by: INTERNAL MEDICINE

## 2020-02-26 PROCEDURE — 99152 PR MOD CONSCIOUS SEDATION, SAME PHYS, 5+ YRS, FIRST 15 MIN: ICD-10-PCS | Mod: ,,, | Performed by: INTERNAL MEDICINE

## 2020-02-26 PROCEDURE — 99153 MOD SED SAME PHYS/QHP EA: CPT | Performed by: INTERNAL MEDICINE

## 2020-02-26 PROCEDURE — 99152 MOD SED SAME PHYS/QHP 5/>YRS: CPT | Performed by: INTERNAL MEDICINE

## 2020-02-26 PROCEDURE — 11000001 HC ACUTE MED/SURG PRIVATE ROOM

## 2020-02-26 PROCEDURE — 36415 COLL VENOUS BLD VENIPUNCTURE: CPT

## 2020-02-26 PROCEDURE — 25000003 PHARM REV CODE 250: Performed by: HOSPITALIST

## 2020-02-26 PROCEDURE — C1887 CATHETER, GUIDING: HCPCS | Performed by: INTERNAL MEDICINE

## 2020-02-26 PROCEDURE — 75710 ARTERY X-RAYS ARM/LEG: CPT | Mod: 26,59,RT, | Performed by: INTERNAL MEDICINE

## 2020-02-26 PROCEDURE — 75710 PR  ANGIO EXTREMITY UNILAT: ICD-10-PCS | Mod: 26,59,RT, | Performed by: INTERNAL MEDICINE

## 2020-02-26 PROCEDURE — 37252 INTRVASC US NONCORONARY 1ST: CPT | Mod: RT,,, | Performed by: INTERNAL MEDICINE

## 2020-02-26 PROCEDURE — 63600175 PHARM REV CODE 636 W HCPCS: Performed by: NURSE PRACTITIONER

## 2020-02-26 PROCEDURE — 85347 COAGULATION TIME ACTIVATED: CPT | Performed by: INTERNAL MEDICINE

## 2020-02-26 PROCEDURE — C1725 CATH, TRANSLUMIN NON-LASER: HCPCS | Performed by: INTERNAL MEDICINE

## 2020-02-26 PROCEDURE — C2623 CATH, TRANSLUMIN, DRUG-COAT: HCPCS | Performed by: INTERNAL MEDICINE

## 2020-02-26 PROCEDURE — 37224 HC FEM/POPL REVAS W/TLA: CPT | Mod: RT | Performed by: INTERNAL MEDICINE

## 2020-02-26 PROCEDURE — 25000003 PHARM REV CODE 250: Performed by: INTERNAL MEDICINE

## 2020-02-26 PROCEDURE — 80048 BASIC METABOLIC PNL TOTAL CA: CPT

## 2020-02-26 PROCEDURE — 85730 THROMBOPLASTIN TIME PARTIAL: CPT

## 2020-02-26 PROCEDURE — C1894 INTRO/SHEATH, NON-LASER: HCPCS | Performed by: INTERNAL MEDICINE

## 2020-02-26 PROCEDURE — 94761 N-INVAS EAR/PLS OXIMETRY MLT: CPT

## 2020-02-26 PROCEDURE — 27201423 OPTIME MED/SURG SUP & DEVICES STERILE SUPPLY: Performed by: INTERNAL MEDICINE

## 2020-02-26 PROCEDURE — 25500020 PHARM REV CODE 255: Performed by: INTERNAL MEDICINE

## 2020-02-26 PROCEDURE — C1769 GUIDE WIRE: HCPCS | Performed by: INTERNAL MEDICINE

## 2020-02-26 PROCEDURE — 37224 PR FEM/POPL REVAS W/TLA: CPT | Mod: RT,,, | Performed by: INTERNAL MEDICINE

## 2020-02-26 PROCEDURE — C1760 CLOSURE DEV, VASC: HCPCS | Performed by: INTERNAL MEDICINE

## 2020-02-26 RX ORDER — IODIXANOL 320 MG/ML
INJECTION, SOLUTION INTRAVASCULAR
Status: DISCONTINUED | OUTPATIENT
Start: 2020-02-26 | End: 2020-02-26 | Stop reason: HOSPADM

## 2020-02-26 RX ORDER — CEFAZOLIN SODIUM 1 G/3ML
INJECTION, POWDER, FOR SOLUTION INTRAMUSCULAR; INTRAVENOUS
Status: DISCONTINUED | OUTPATIENT
Start: 2020-02-26 | End: 2020-02-26 | Stop reason: HOSPADM

## 2020-02-26 RX ORDER — HEPARIN SODIUM 1000 [USP'U]/ML
INJECTION, SOLUTION INTRAVENOUS; SUBCUTANEOUS
Status: DISCONTINUED | OUTPATIENT
Start: 2020-02-26 | End: 2020-02-26 | Stop reason: HOSPADM

## 2020-02-26 RX ORDER — HYDROMORPHONE HYDROCHLORIDE 1 MG/ML
INJECTION, SOLUTION INTRAMUSCULAR; INTRAVENOUS; SUBCUTANEOUS
Status: DISCONTINUED | OUTPATIENT
Start: 2020-02-26 | End: 2020-02-26 | Stop reason: HOSPADM

## 2020-02-26 RX ORDER — LIDOCAINE HCL/EPINEPHRINE/PF 2%-1:200K
10 VIAL (ML) INJECTION ONCE
Status: COMPLETED | OUTPATIENT
Start: 2020-02-26 | End: 2020-02-26

## 2020-02-26 RX ORDER — MIDAZOLAM HYDROCHLORIDE 1 MG/ML
INJECTION INTRAMUSCULAR; INTRAVENOUS
Status: DISCONTINUED | OUTPATIENT
Start: 2020-02-26 | End: 2020-02-26 | Stop reason: HOSPADM

## 2020-02-26 RX ORDER — DIPHENHYDRAMINE HYDROCHLORIDE 50 MG/ML
INJECTION INTRAMUSCULAR; INTRAVENOUS
Status: DISCONTINUED | OUTPATIENT
Start: 2020-02-26 | End: 2020-02-26 | Stop reason: HOSPADM

## 2020-02-26 RX ORDER — FENTANYL CITRATE 50 UG/ML
INJECTION, SOLUTION INTRAMUSCULAR; INTRAVENOUS
Status: DISCONTINUED | OUTPATIENT
Start: 2020-02-26 | End: 2020-02-26 | Stop reason: HOSPADM

## 2020-02-26 RX ADMIN — SODIUM CHLORIDE 284.4 ML: 0.9 INJECTION, SOLUTION INTRAVENOUS at 11:02

## 2020-02-26 RX ADMIN — MORPHINE SULFATE 4 MG: 4 INJECTION INTRAVENOUS at 05:02

## 2020-02-26 RX ADMIN — DOCUSATE SODIUM 100 MG: 100 CAPSULE, LIQUID FILLED ORAL at 04:02

## 2020-02-26 RX ADMIN — MORPHINE SULFATE 4 MG: 4 INJECTION INTRAVENOUS at 06:02

## 2020-02-26 RX ADMIN — CLOPIDOGREL 75 MG: 75 TABLET, FILM COATED ORAL at 08:02

## 2020-02-26 RX ADMIN — ASPIRIN 81 MG: 81 TABLET, COATED ORAL at 08:02

## 2020-02-26 RX ADMIN — RIVAROXABAN 20 MG: 20 TABLET, FILM COATED ORAL at 04:02

## 2020-02-26 RX ADMIN — HYDROCODONE BITARTRATE AND ACETAMINOPHEN 1 TABLET: 5; 325 TABLET ORAL at 08:02

## 2020-02-26 RX ADMIN — ATORVASTATIN CALCIUM 80 MG: 40 TABLET, FILM COATED ORAL at 04:02

## 2020-02-26 RX ADMIN — LISINOPRIL 20 MG: 20 TABLET ORAL at 04:02

## 2020-02-26 RX ADMIN — LIDOCAINE HYDROCHLORIDE,EPINEPHRINE BITARTRATE 10 ML: 20; .005 INJECTION, SOLUTION EPIDURAL; INFILTRATION; INTRACAUDAL; PERINEURAL at 12:02

## 2020-02-26 RX ADMIN — MORPHINE SULFATE 4 MG: 4 INJECTION INTRAVENOUS at 02:02

## 2020-02-26 RX ADMIN — HYDROCODONE BITARTRATE AND ACETAMINOPHEN 1 TABLET: 5; 325 TABLET ORAL at 04:02

## 2020-02-26 RX ADMIN — HYDROCHLOROTHIAZIDE 12.5 MG: 12.5 TABLET ORAL at 04:02

## 2020-02-26 RX ADMIN — AMLODIPINE BESYLATE 10 MG: 5 TABLET ORAL at 04:02

## 2020-02-26 NOTE — PLAN OF CARE
Dr. Good at bedside. Ok to inject lido w/ epi left groin slight slow ooze. No swelling noted. Site is soft. Will continue to monitor pt. Notified Dr. Good pt c/o of right stump pain 9/10. Pt states he had stump pain pre op. Pt fell back asleep. dopplered right popiteal pulse and  Left pedal. Skin is warm to touch and normal in color. Will continue to monitor pt.

## 2020-02-26 NOTE — PLAN OF CARE
Patient transferred to floor on tele monitor.  VSS. No c/o pain.   Patient attached to tele monitor upon arrival in room.  Left groin gauze/tegaderm site reviewed with JOSE Cui at bedside site CDI. No bleeding, no shadowing, no swelling noted.  Left pedal pulse dopplered. Right popliteal pulses dopplered.  All questions answered. Updated pt's wife at bedside.

## 2020-02-26 NOTE — PLAN OF CARE
Patient transferred to recovery cath lab slot 3 via stretcher with side rails up x2 .  Pt drowsy but able to follow commands.  Pt is stable when connecting to cardiac monitors.  VSS.  Left groin site with gauze, tegaderm dry intact with shadowing noted.  No swelling noted. Pressure applied 10 minutes.  Dopplered left pedal pulse and right popliteal pulse.  Palpated +2 kevan radial pulses.  Skin is normal in color, warm to touch, < 3 sec cap refill.  Fall risk precautions given and patient acknowledges.  AIDET completed to pt.  Will continue to monitor patient.    Updated pt's wife in sx waiting room.

## 2020-02-26 NOTE — ASSESSMENT & PLAN NOTE
Antiphospholipid antibody positive  History of right below knee amputation  Appreciate Cardiology. Continue heparin gtt, aspirin 81 mg daily, clopidogrel 75 mg daily, atorvastatin to 80 mg daily. He is  NPO planned revascularization today.

## 2020-02-26 NOTE — PLAN OF CARE
Received patient upon rounds at 1915H, patient seen in bed on semi-dunn's position.Wife at bedside. Conscious , coherent to time, date, place, person and situation. GCS 15.Bed alarm strongly refused,  fall risk scale of (16). Patient uses crutches and has right leg prosthesis.  Education given regarding risk for fall, still patient strongly refused for bed alarm. Advised patient to call for any assistance. With saline lock gauge 20 left forearm, with ongoing heparin drip at 14 units/kg/hr infusing well.  With subjective complaint of right leg pain, 9/10 moderately relieved with norco and morphine IV.Right BKA stump  pinkish to red  in color, warm to touch.Right radial post angio site with clean and dry dressing, no hematoma, no edema on the site. Advised patient to call for any assistance. Telemetry Normal sinus rhythm 80's.Oxygen saturation 94% on room air.Call bell within reach. Visited at regular intervals.Safety fall precaution maintained.Will continue to monitor patient.  0614H- Patient stable VS over the night, afebrile. No untoward signs and symptoms noted over the night. Still refusing for bed alarm. Telemetry no ectopy. Free from fall. IV line patent.Heparin ongoing at 14units /kg/hr therapeutic at 50.8 aptt this morning. Next Aptt for tomorrow morning. Kept NPO post midnight. Will endorse patient to day shift Nurse.

## 2020-02-26 NOTE — NURSING
All progress notes, labs and vital signs reviewed. VN cued into room. No response to VN's verbal cues x 2 attempts. Pt not visualized in room. Pt off unit. Will attempt again as time allows.

## 2020-02-26 NOTE — NURSING
Permission attained to enter room via virtual system.  Virtual rounds completed as documented.  Today's lab values, notes, and vital signs up to now have been reviewed.  Pt stated can already feel his r leg getting warm   Hoping for discharge tomorrow   Reminded not to get out of bed unassisted   Will be checking with bedside nurse to see if heparin to be continued

## 2020-02-26 NOTE — PROGRESS NOTES
"[facility] Hospital Medicine  Progress Note    Patient Name: Ren Grove  MRN: [id number]  Patient Class: IP- Inpatient   Admission Date: 2/20/2020  Length of Stay: 5 days  Attending Physician: Twin Mendez MD*  Primary Care Provider: Nomi Bermeo MD        Subjective:     Principal Problem:Critical lower limb ischemia        HPI:  Ren Grove is a 39 y.o. 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 He lives in [place], [place]. His primary care physician is Dr. Nomi Bermeo.   He had been hospitalized at [facility] a year prior (24/19 to 2/15/19) for right popliteal artery stenosis and was prescribed warfarin.    He presented to [facility] Emergency Department on 2/20/20 with acute pain, swelling, and redness of his right leg stump. His prosthesis was not fitting correctly. He noted a dusky appearance and coolness to touch. He reported that he had stopped taking warfarin, saying "I did not like them" and that his primary doctor and the person who made his prosthetic thought that aspirin was probably enough. Ultrasound suggested deep femoral artery stenosis and occlusion of the distal superficial femoral artery and popliteal artery. Interventional Cardiology was contacted and recommended loading clopidrogel, giving aspirin and heparin infusion. He was admitted to [facility].    Overview/Hospital Course:  Cardiology recommended continuing the aspirin, clopidrogel, and heparin. He underwent right lower extremity peripheral angiogram on 2/24/20 finding a 99% stenotic lesion of the proximal right superficial " femoral artery, 95% stenotic lesion of the mid to distal superficial femoral artery, a 100% stenotic lesion of the distal superficial femoral artery, and a 100% stenotic lesion of the proximal to mid popliteal artery.  Cardiology is planning a revascularization on today.     Interval History: Resting well, no distress noted. No complaints of pain noted. Will follow.     Review of Systems   Constitutional: Negative for chills and fever.   HENT: Negative for trouble swallowing.    Eyes: Negative for visual disturbance.   Respiratory: Negative for cough, shortness of breath and wheezing.    Cardiovascular: Negative for chest pain and palpitations.   Gastrointestinal: Negative for nausea and vomiting.   Genitourinary: Negative for difficulty urinating and hematuria.   Musculoskeletal: Positive for gait problem and myalgias.   Skin: Positive for color change.   Neurological: Negative for dizziness, light-headedness and numbness.   Hematological: Does not bruise/bleed easily.   Psychiatric/Behavioral: Negative for agitation, confusion and hallucinations.     Objective:     Vital Signs (Most Recent):  Temp: 97.3 °F (36.3 °C) (02/26/20 0739)  Pulse: 84 (02/26/20 0750)  Resp: 16 (02/26/20 0739)  BP: 128/71 (02/26/20 0739)  SpO2: 98 % (02/26/20 0739) Vital Signs (24h Range):  Temp:  [97 °F (36.1 °C)-98.2 °F (36.8 °C)] 97.3 °F (36.3 °C)  Pulse:  [72-95] 84  Resp:  [16-18] 16  SpO2:  [93 %-98 %] 98 %  BP: (112-159)/(62-99) 128/71     Weight: 94.8 kg (209 lb)  Body mass index is 30.86 kg/m².    Intake/Output Summary (Last 24 hours) at 2/26/2020 0905  Last data filed at 2/25/2020 2350  Gross per 24 hour   Intake 500 ml   Output --   Net 500 ml      Physical Exam   Constitutional: He is oriented to person, place, and time. He appears well-developed. No distress.   HENT:   Head: Normocephalic and atraumatic.   Eyes: Pupils are equal, round, and reactive to light.   Neck: Normal range of motion. No JVD present.   Cardiovascular:  Normal rate and intact distal pulses.   Pulmonary/Chest: Effort normal. No respiratory distress.   Abdominal: Soft. Bowel sounds are normal. He exhibits no distension. There is no tenderness. There is no guarding.   Musculoskeletal: He exhibits tenderness (RLE) and deformity (Right BKA).   Neurological: He is alert and oriented to person, place, and time.   Skin: Capillary refill takes 2 to 3 seconds. There is erythema (RLE).   Psychiatric: He has a normal mood and affect. His behavior is normal.   Nursing note and vitals reviewed.      Significant Labs: All pertinent labs within the past 24 hours have been reviewed.    Significant Imaging: I have reviewed all pertinent imaging results/findings within the past 24 hours.       Assessment/Plan:      * Critical lower limb ischemia  Antiphospholipid antibody positive  History of right below knee amputation  Appreciate Cardiology. Continue heparin gtt, aspirin 81 mg daily, clopidogrel 75 mg daily, atorvastatin to 80 mg daily. He is  NPO planned revascularization today.    Essential hypertension  Hyperlipidemia   Continue home aspirin, amlodipine, lisinopril-hydrochlorothiazide, atorvastatin.       VTE Risk Mitigation (From admission, onward)         Ordered     heparin infusion 1,000 units/500 ml in 0.9% NaCl (pressure line flush)  Intra-op continuous PRN      02/24/20 0949     IP VTE HIGH RISK PATIENT  Once      02/21/20 0233     Reason for no Mechanical VTE Prophylaxis  Once     Question:  Reasons:  Answer:  Physician Provided (leave comment)  Comment:  on heparin gtt     02/21/20 0233     heparin 25,000 units in dextrose 5% 250 mL (100 units/mL) infusion HIGH INTENSITY nomogram - [facility]  Continuous     Question:  Heparin Infusion Adjustment (DO NOT MODIFY ANSWER)  Answer:  [email or web address]\epic\Images\Pharmacy\HeparinInfusions\heparin HIGH INTENSITY nomogram for [facility] [id number].pdf    02/20/20 7353     heparin 25,000 units in dextrose 5% (100 units/ml) IV bolus from bag -  ADDITIONAL PRN BOLUS - 60 units/kg  As needed (PRN)     Question:  Heparin Infusion Adjustment (DO NOT MODIFY ANSWER)  Answer:  \\[email or web address]\epic\Images\Pharmacy\HeparinInfusions\heparin HIGH INTENSITY nomogram for [facility] [id number].pdf    02/20/20 2256     heparin 25,000 units in dextrose 5% (100 units/ml) IV bolus from bag - ADDITIONAL PRN BOLUS - 30 units/kg  As needed (PRN)     Question:  Heparin Infusion Adjustment (DO NOT MODIFY ANSWER)  Answer:  \\[email or web address]\epic\Images\Pharmacy\HeparinInfusions\heparin HIGH INTENSITY nomogram for [facility] [id number].pdf    02/20/20 2256                      Keke Bates NP  Department of Hospital Medicine   [facility]

## 2020-02-26 NOTE — PROCEDURES
: Pete Good MD  Pre-procedure diagnosis: PAD  Post-procedure diagnosis: PAD    Post Procedure Note: Peripheral PTA    The pt was brought to the cath lab and under sterile technique, LCFA access was obtained without difficulty. Images were obtained in multiple views and successful IVUS guided PTA with DCB with excellent results. Please see full report for details. The pt tolerated the procedure well without complications. Perclose closure device used with successful hemostasis.     Vitals:    02/26/20 0421 02/26/20 0559 02/26/20 0739 02/26/20 0750   BP:  124/64 128/71    BP Location:  Right arm Right arm    Patient Position:  Lying Lying    Pulse:  78 86 84   Resp:  18 16    Temp:  97 °F (36.1 °C) 97.3 °F (36.3 °C)    TempSrc:  Oral Oral    SpO2: (!) 94% 95% 98%    Weight:       Height:             Gen: NAD  Ext: 2+ fem pulses, no evidence of hematoma  Estimated blood loss: < 50 cc    Plan:  -Post cath care per protocol  -ASA/plavix/DOAC

## 2020-02-26 NOTE — SUBJECTIVE & OBJECTIVE
Interval History: Resting well, no distress noted. No complaints of pain noted. Will follow.     Review of Systems   Constitutional: Negative for chills and fever.   HENT: Negative for trouble swallowing.    Eyes: Negative for visual disturbance.   Respiratory: Negative for cough, shortness of breath and wheezing.    Cardiovascular: Negative for chest pain and palpitations.   Gastrointestinal: Negative for nausea and vomiting.   Genitourinary: Negative for difficulty urinating and hematuria.   Musculoskeletal: Positive for gait problem and myalgias.   Skin: Positive for color change.   Neurological: Negative for dizziness, light-headedness and numbness.   Hematological: Does not bruise/bleed easily.   Psychiatric/Behavioral: Negative for agitation, confusion and hallucinations.     Objective:     Vital Signs (Most Recent):  Temp: 97.3 °F (36.3 °C) (02/26/20 0739)  Pulse: 84 (02/26/20 0750)  Resp: 16 (02/26/20 0739)  BP: 128/71 (02/26/20 0739)  SpO2: 98 % (02/26/20 0739) Vital Signs (24h Range):  Temp:  [97 °F (36.1 °C)-98.2 °F (36.8 °C)] 97.3 °F (36.3 °C)  Pulse:  [72-95] 84  Resp:  [16-18] 16  SpO2:  [93 %-98 %] 98 %  BP: (112-159)/(62-99) 128/71     Weight: 94.8 kg (209 lb)  Body mass index is 30.86 kg/m².    Intake/Output Summary (Last 24 hours) at 2/26/2020 0905  Last data filed at 2/25/2020 2350  Gross per 24 hour   Intake 500 ml   Output --   Net 500 ml      Physical Exam   Constitutional: He is oriented to person, place, and time. He appears well-developed. No distress.   HENT:   Head: Normocephalic and atraumatic.   Eyes: Pupils are equal, round, and reactive to light.   Neck: Normal range of motion. No JVD present.   Cardiovascular: Normal rate and intact distal pulses.   Pulmonary/Chest: Effort normal. No respiratory distress.   Abdominal: Soft. Bowel sounds are normal. He exhibits no distension. There is no tenderness. There is no guarding.   Musculoskeletal: He exhibits tenderness (LLE) and deformity  (Left BKA).   Neurological: He is alert and oriented to person, place, and time.   Skin: Capillary refill takes 2 to 3 seconds. There is erythema (LLE).   Psychiatric: He has a normal mood and affect. His behavior is normal.   Nursing note and vitals reviewed.      Significant Labs: All pertinent labs within the past 24 hours have been reviewed.    Significant Imaging: I have reviewed all pertinent imaging results/findings within the past 24 hours.

## 2020-02-26 NOTE — INTERVAL H&P NOTE
The patient has been examined and the H&P has been reviewed:    I concur with the findings and no changes have occurred since H&P was written.    Anesthesia/Surgery risks, benefits and alternative options discussed and understood by patient/family.          Active Hospital Problems    Diagnosis  POA    *Critical lower limb ischemia [I99.8]  Yes    Antiphospholipid antibody positive [R76.0]  Yes     Chronic    Essential hypertension [I10]  Yes     Chronic    History of right below knee amputation [Z89.511]  Not Applicable     Chronic    HLD (hyperlipidemia) [E78.5]  Yes     Chronic    Anxiety and depression [F41.9, F32.9]  Yes     Chronic    Peripheral vascular disorder [I73.9]  Yes     Chronic      Resolved Hospital Problems    Diagnosis Date Resolved POA    Warfarin anticoagulation [Z79.01] 02/21/2020 Not Applicable

## 2020-02-27 ENCOUNTER — TELEPHONE (OUTPATIENT)
Dept: CARDIOLOGY | Facility: CLINIC | Age: 40
End: 2020-02-27

## 2020-02-27 VITALS
BODY MASS INDEX: 30.96 KG/M2 | RESPIRATION RATE: 17 BRPM | OXYGEN SATURATION: 96 % | SYSTOLIC BLOOD PRESSURE: 129 MMHG | HEIGHT: 69 IN | TEMPERATURE: 96 F | WEIGHT: 209 LBS | DIASTOLIC BLOOD PRESSURE: 76 MMHG | HEART RATE: 106 BPM

## 2020-02-27 PROCEDURE — 25000003 PHARM REV CODE 250: Performed by: INTERNAL MEDICINE

## 2020-02-27 PROCEDURE — 25000003 PHARM REV CODE 250: Performed by: NURSE PRACTITIONER

## 2020-02-27 PROCEDURE — 25000003 PHARM REV CODE 250: Performed by: HOSPITALIST

## 2020-02-27 PROCEDURE — 94761 N-INVAS EAR/PLS OXIMETRY MLT: CPT

## 2020-02-27 RX ORDER — ATORVASTATIN CALCIUM 80 MG/1
80 TABLET, FILM COATED ORAL DAILY
Qty: 90 TABLET | Refills: 3 | Status: SHIPPED | OUTPATIENT
Start: 2020-02-28 | End: 2021-02-27

## 2020-02-27 RX ORDER — CLOPIDOGREL BISULFATE 75 MG/1
75 TABLET ORAL DAILY
Qty: 30 TABLET | Refills: 11 | Status: SHIPPED | OUTPATIENT
Start: 2020-02-28 | End: 2021-02-27

## 2020-02-27 RX ADMIN — DOCUSATE SODIUM 100 MG: 100 CAPSULE, LIQUID FILLED ORAL at 08:02

## 2020-02-27 RX ADMIN — HYDROCHLOROTHIAZIDE 12.5 MG: 12.5 TABLET ORAL at 08:02

## 2020-02-27 RX ADMIN — HYDROCODONE BITARTRATE AND ACETAMINOPHEN 1 TABLET: 5; 325 TABLET ORAL at 01:02

## 2020-02-27 RX ADMIN — ATORVASTATIN CALCIUM 80 MG: 40 TABLET, FILM COATED ORAL at 08:02

## 2020-02-27 RX ADMIN — AMLODIPINE BESYLATE 10 MG: 5 TABLET ORAL at 08:02

## 2020-02-27 RX ADMIN — LISINOPRIL 20 MG: 20 TABLET ORAL at 08:02

## 2020-02-27 RX ADMIN — ASPIRIN 81 MG: 81 TABLET, COATED ORAL at 08:02

## 2020-02-27 RX ADMIN — CLOPIDOGREL 75 MG: 75 TABLET, FILM COATED ORAL at 08:02

## 2020-02-27 RX ADMIN — HYDROCODONE BITARTRATE AND ACETAMINOPHEN 1 TABLET: 5; 325 TABLET ORAL at 08:02

## 2020-02-27 NOTE — PLAN OF CARE
Received patient upon rounds at 1925[facility], patient seen in bed on semi-dunn's position.Wife at bedside. Conscious , coherent to time, date, place, person and situation. GCS 15.Bed alarm strongly refused,  fall risk scale of (16). Patient uses crutches and has right leg prosthesis.  Education given regarding risk for fall, still patient strongly refused for bed alarm. Advised patient to call for any assistance. With saline lock gauge 20 left forearm, with ongoing heparin drip at 14 units/kg/hr infusing well.  With subjective complaint of right leg pain, 8/10 moderately relieved with norco. Right BKA stump  pinkish to red  in color, warm to touch.Left groin post angio site with clean and dry dressing, no hematoma, no edema on the site. Advised patient to call for any assistance. Telemetry Normal sinus rhythm 80's.Oxygen saturation 94% on room air.Call bell within reach. Visited at regular intervals.Safety fall precaution maintained.Will continue to monitor patient.  0613H- Patient stable VS over the night, afebrile. No untoward signs and symptoms noted over the night. Still refusing for bed alarm.Telemetry no ectopy. Free from fall. IV line patent. Currently asleep.Will endorse patient to day shift Nurse.

## 2020-02-27 NOTE — DISCHARGE SUMMARY
"[facility] Hospital Medicine  Discharge Summary      Patient Name: Ren Grove  MRN: [id number]  Admission Date: 2/20/2020  Hospital Length of Stay: 6 days  Discharge Date and Time:  02/27/2020 1:37 PM  Attending Physician: Iglesia Gonzales DO   Discharging Provider: Keke Bates NP  Primary Care Provider: Nomi Bermeo MD      HPI:   Ren Grove is a 39 y.o. 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 He lives in [place], [place]. His primary care physician is Dr. Nomi Bermeo.   He had been hospitalized at [facility] a year prior (24/19 to 2/15/19) for right popliteal artery stenosis and was prescribed warfarin.    He presented to [facility] Emergency Department on 2/20/20 with acute pain, swelling, and redness of his right leg stump. His prosthesis was not fitting correctly. He noted a dusky appearance and coolness to touch. He reported that he had stopped taking warfarin, saying "I did not like them" and that his primary doctor and the person who made his prosthetic thought that aspirin was probably enough. Ultrasound suggested deep femoral artery stenosis and occlusion of the distal superficial femoral artery and popliteal artery. Interventional Cardiology was contacted and recommended loading clopidrogel, giving aspirin and heparin infusion. He was admitted to [facility].    Procedure(s) (LRB):  PTA, Popliteal  PTA, Femoral Artery  Angiogram Extremity Unilateral (Right)      Hospital Course:   Cardiology recommended continuing the aspirin, clopidrogel, and heparin. He underwent right lower extremity " peripheral angiogram on 2/24/20 finding a 99% stenotic lesion of the proximal right superficial femoral artery, 95% stenotic lesion of the mid to distal superficial femoral artery, a 100% stenotic lesion of the distal superficial femoral artery, and a 100% stenotic lesion of the proximal to mid popliteal artery. POD#1 Revascularization. Will d/c home with Plavix and Rivaroxaban. Will need to follow up with Dr. Good and his PCP.      Consults:   Consults (From admission, onward)        Status Ordering Provider     Inpatient consult to Interventional Cardiology  Once     Provider:  Navi Vallecillo MD    Completed PORESJENNY     IP consult to case management  Once     Provider:  (Not yet assigned)    Acknowledged JENNY DUFFY          * Critical lower limb ischemia  Antiphospholipid antibody positive  History of right below knee amputation  Appreciate Cardiology. The heparin gtt has been stopped. Will cont with  aspirin 81 mg daily, clopidogrel 75 mg daily, atorvastatin to 80 mg daily. He is POD#1 revascularization. Will cont statin and will add rivaroxaban 20 mg with dinner.    Essential hypertension  Hyperlipidemia   Continue home aspirin, amlodipine, lisinopril-hydrochlorothiazide, atorvastatin.       Final Active Diagnoses:    Diagnosis Date Noted POA    PRINCIPAL PROBLEM:  Critical lower limb ischemia [I99.8] 02/21/2020 Yes    Antiphospholipid antibody positive [R76.0] 03/03/2016 Yes     Chronic    Essential hypertension [I10] 01/20/2016 Yes     Chronic    History of right below knee amputation [Z89.511] 01/20/2016 Not Applicable     Chronic    HLD (hyperlipidemia) [E78.5] 01/20/2016 Yes     Chronic    Anxiety and depression [F41.9, F32.9] 01/20/2016 Yes     Chronic    Peripheral vascular disorder [I73.9] 12/09/2015 Yes     Chronic      Problems Resolved During this Admission:    Diagnosis Date Noted Date Resolved POA    Warfarin anticoagulation [Z79.01] 02/15/2019 02/21/2020 Not Applicable        Discharged Condition: stable    Disposition: Home or Self Care    Follow Up:  Follow-up Information     Nomi Bermeo MD In 1 week.    Specialty:  Internal Medicine  Why:  hospital follow up  Contact information:  [address] [facility] [address]  [place] [address]  [phone]             Pete Good MD In 2 weeks.    Specialties:  INTERVENTIONAL CARDIOLOGY, Cardiology  Why:  hospital follow up  Contact information:  [address]  [address]  [facility] [id number] [phone]                 Patient Instructions:      Diet Cardiac     Notify your health care provider if you experience any of the following:  temperature >100.4     Notify your health care provider if you experience any of the following:  persistent nausea and vomiting or diarrhea     Notify your health care provider if you experience any of the following:  severe uncontrolled pain     Notify your health care provider if you experience any of the following:  redness, tenderness, or signs of infection (pain, swelling, redness, odor or green/yellow discharge around incision site)     Notify your health care provider if you experience any of the following:  difficulty breathing or increased cough     Notify your health care provider if you experience any of the following:  severe persistent headache     Notify your health care provider if you experience any of the following:  worsening rash     Notify your health care provider if you experience any of the following:  persistent dizziness, light-headedness, or visual disturbances     Notify your health care provider if you experience any of the following:  increased confusion or weakness     Activity as tolerated       Significant Diagnostic Studies: Labs:   BMP:   Recent Labs   Lab 02/26/20  1930         K 3.5      CO2 24   BUN 9   CREATININE 0.8   CALCIUM 9.4    and CBC No results for input(s): WBC, HGB, HCT, PLT in the last 48 hours.    Pending Diagnostic Studies:      None         Medications:  Reconciled Home Medications:      Medication List      START taking these medications    clopidogreL 75 mg tablet  Commonly known as:  PLAVIX  Take 1 tablet (75 mg total) by mouth once daily.  Start taking on:  February 28, 2020     Xarelto 20 mg Tab  Generic drug:  rivaroxaban  Take 1 tablet (20 mg total) by mouth daily with dinner or evening meal.        CHANGE how you take these medications    atorvastatin 80 MG tablet  Commonly known as:  LIPITOR  Take 1 tablet (80 mg total) by mouth once daily.  Start taking on:  February 28, 2020  What changed:    · medication strength  · how much to take        CONTINUE taking these medications    amLODIPine 10 MG tablet  Commonly known as:  NORVASC  Take 1 tablet (10 mg total) by mouth once daily.     lisinopril-hydrochlorothiazide 20-12.5 mg per tablet  Commonly known as:  PRINZIDE,ZESTORETIC  Take 1 tablet by mouth once daily.     metoprolol tartrate 50 MG tablet  Commonly known as:  LOPRESSOR  Take 50 mg by mouth 2 (two) times daily.        STOP taking these medications    aspirin 81 MG Chew     ibuprofen 200 MG tablet  Commonly known as:  ADVIL,MOTRIN     warfarin 5 MG tablet  Commonly known as:  COUMADIN            Indwelling Lines/Drains at time of discharge:   Lines/Drains/Airways     None                 Time spent on the discharge of patient: 35 minutes  Patient was seen and examined on the date of discharge and determined to be suitable for discharge.         Keke Bates NP  Department of Hospital Medicine  [facility]

## 2020-02-27 NOTE — TELEPHONE ENCOUNTER
Can you help me get this pt scheduled at the [facility] location with Dr. Good on Thursday March 12 at 11:40     Thanks

## 2020-02-27 NOTE — PLAN OF CARE
Pt has his crutches and his prosthetic leg at his bedside.  He said he tried his prosthesis on and his leg is still swollen but he knows that will go down with time.  He also has a wheelchair at home.  Pt remains self-sufficient at this time.  He says he feels he is ready to discharge home.  His wife Jennifer will be on her way to the hospital soon.  Pt says [facility] called him about discharge medications and he denies problems affording them.  Pt has my contact information and encouraged to call with questions.       02/27/20 1111   Discharge Reassessment   Assessment Type Discharge Planning Reassessment   Provided patient/caregiver education on the expected discharge date and the discharge plan Yes   Do you have any problems affording any of your prescribed medications? No   Discharge Plan A Home   Discharge Plan B Home with family   DME Needed Upon Discharge  none   Anticipated Discharge Disposition Home   Can the patient/caregiver answer the patient profile reliably? Yes, cognitively intact     Live Lane RN,   [phone]

## 2020-02-27 NOTE — ASSESSMENT & PLAN NOTE
Antiphospholipid antibody positive  History of right below knee amputation  Appreciate Cardiology. The heparin gtt has been stopped. Will cont with  aspirin 81 mg daily, clopidogrel 75 mg daily, atorvastatin to 80 mg daily. He is POD#1 revascularization. Will cont statin and will add rivaroxaban 20 mg with dinner.

## 2020-02-27 NOTE — NURSING
VN entered into the patient's room with permission via tele health system.. I have reviewed the AVS in detail with the patient and his wife using the teach back method. The patient was informed about his diagnosis, along with how and when to follow up with his physician. His medications and education were thoroughly explained. Time was allotted for questions. All questions and concerns were addressed. Patient is currently awaiting transportation.

## 2020-02-27 NOTE — PLAN OF CARE
Rounds completed on pt.  All questions addressed.  Bedside nurse to discuss d/c medications.  Discussed importance to attend all f/u appts and take medications as prescribed.  Verbalized understanding.    Follow up with Pete Good MD   The office will call you to set up appointment.  [address]   [address]   [place] [address]   [phone]     Mar10 Follow up with Shannan Vega MD   Tuesday Mar 10, 2020   9:00am  [address]   [facility] [place] [address]   [phone]         02/27/20 1434   Final Note   Assessment Type Final Discharge Note   Anticipated Discharge Disposition Home   Hospital Follow Up  Appt(s) scheduled? Yes   Discharge plans and expectations educations in teach back method with documentation complete? Yes   Right Care Referral Info   Post Acute Recommendation No Care     Live Lane RN,   [phone]

## 2020-02-27 NOTE — TELEPHONE ENCOUNTER
----- Message from Ena Lane RN sent at 2/27/2020  2:30 PM CST -----  Keshia,  Pt had a procedure done with Dr. Good during his hospital stay.  He will need a hospital follow up appt please.  Please contact the pt to set up appt.  Thank you for your help,  Live Lane RN,   [phone]

## 2020-02-27 NOTE — CARE UPDATE
No cath lab related complications noted.  Patient appropriate for discharge from cardiology perspective.

## 2020-02-27 NOTE — SUBJECTIVE & OBJECTIVE
Interval History: Resting well, no distress noted. No complaints of pain noted. Will follow.     Review of Systems   Constitutional: Negative for chills and fever.   HENT: Negative for trouble swallowing.    Eyes: Negative for visual disturbance.   Respiratory: Negative for cough, shortness of breath and wheezing.    Cardiovascular: Negative for chest pain and palpitations.   Gastrointestinal: Negative for nausea and vomiting.   Genitourinary: Negative for difficulty urinating and hematuria.   Musculoskeletal: Positive for gait problem. Negative for myalgias.   Skin: Positive for color change and wound (left groin).   Neurological: Negative for dizziness, light-headedness and numbness.   Hematological: Does not bruise/bleed easily.   Psychiatric/Behavioral: Negative for agitation, confusion and hallucinations.     Objective:     Vital Signs (Most Recent):  Temp: 97 °F (36.1 °C) (02/27/20 0834)  Pulse: (!) 111 (02/27/20 0834)  Resp: 16 (02/27/20 0834)  BP: 137/72 (02/27/20 0834)  SpO2: 97 % (02/27/20 0834) Vital Signs (24h Range):  Temp:  [96.4 °F (35.8 °C)-98.3 °F (36.8 °C)] 97 °F (36.1 °C)  Pulse:  [] 111  Resp:  [11-20] 16  SpO2:  [94 %-97 %] 97 %  BP: (124-158)/(72-92) 137/72     Weight: 94.8 kg (209 lb)  Body mass index is 30.86 kg/m².    Intake/Output Summary (Last 24 hours) at 2/27/2020 0946  Last data filed at 2/27/2020 0101  Gross per 24 hour   Intake 250 ml   Output --   Net 250 ml      Physical Exam   Constitutional: He is oriented to person, place, and time. He appears well-developed. No distress.   HENT:   Head: Normocephalic and atraumatic.   Eyes: Pupils are equal, round, and reactive to light.   Neck: Normal range of motion. No JVD present.   Cardiovascular: Normal rate and intact distal pulses.   Pulmonary/Chest: Effort normal. No respiratory distress.   Abdominal: Soft. Bowel sounds are normal. He exhibits no distension. There is no tenderness. There is no guarding.   Musculoskeletal: He  exhibits tenderness and deformity (right BKA).   Neurological: He is alert and oriented to person, place, and time.   Skin: Capillary refill takes 2 to 3 seconds. There is erythema (Right lower ext).   Left groin is soft to palpate. No drainage noted and no hematoma   Psychiatric: He has a normal mood and affect. His behavior is normal.   Nursing note and vitals reviewed.      Significant Labs: All pertinent labs within the past 24 hours have been reviewed.    Significant Imaging: I have reviewed all pertinent imaging results/findings within the past 24 hours.

## 2020-12-21 ENCOUNTER — HOSPITAL ENCOUNTER (EMERGENCY)
Facility: HOSPITAL | Age: 40
Discharge: HOME | End: 2020-12-22
Attending: EMERGENCY MEDICINE
Payer: MEDICARE

## 2020-12-21 DIAGNOSIS — R07.89 ATYPICAL CHEST PAIN: ICD-10-CM

## 2020-12-21 DIAGNOSIS — R07.9 CHEST PAIN: ICD-10-CM

## 2020-12-21 DIAGNOSIS — R09.1 PLEURISY: Primary | ICD-10-CM

## 2020-12-21 LAB
ALBUMIN SERPL BCP-MCNC: 4.3 G/DL (ref 3.5–5.2)
ALP SERPL-CCNC: 138 U/L (ref 55–135)
ALT SERPL W/O P-5'-P-CCNC: 21 U/L (ref 10–44)
ANION GAP SERPL CALC-SCNC: 14 MMOL/L (ref 8–16)
AST SERPL-CCNC: 30 U/L (ref 10–40)
BASOPHILS # BLD AUTO: 0.07 K/UL (ref 0–0.2)
BASOPHILS NFR BLD: 0.6 % (ref 0–1.9)
BILIRUB SERPL-MCNC: 0.8 MG/DL (ref 0.1–1)
BNP SERPL-MCNC: <10 PG/ML (ref 0–99)
BUN SERPL-MCNC: 12 MG/DL (ref 6–20)
CALCIUM SERPL-MCNC: 9.6 MG/DL (ref 8.7–10.5)
CHLORIDE SERPL-SCNC: 103 MMOL/L (ref 95–110)
CO2 SERPL-SCNC: 21 MMOL/L (ref 23–29)
CREAT SERPL-MCNC: 0.8 MG/DL (ref 0.5–1.4)
DIFFERENTIAL METHOD: ABNORMAL
EOSINOPHIL # BLD AUTO: 0.2 K/UL (ref 0–0.5)
EOSINOPHIL NFR BLD: 1.6 % (ref 0–8)
ERYTHROCYTE [DISTWIDTH] IN BLOOD BY AUTOMATED COUNT: 13.5 % (ref 11.5–14.5)
EST. GFR  (AFRICAN AMERICAN): >60 ML/MIN/1.73 M^2
EST. GFR  (NON AFRICAN AMERICAN): >60 ML/MIN/1.73 M^2
GLUCOSE SERPL-MCNC: 87 MG/DL (ref 70–110)
HCT VFR BLD AUTO: 46.6 % (ref 40–54)
HGB BLD-MCNC: 15.6 G/DL (ref 14–18)
IMM GRANULOCYTES # BLD AUTO: 0.05 K/UL (ref 0–0.04)
IMM GRANULOCYTES NFR BLD AUTO: 0.4 % (ref 0–0.5)
LYMPHOCYTES # BLD AUTO: 3.8 K/UL (ref 1–4.8)
LYMPHOCYTES NFR BLD: 31.9 % (ref 18–48)
MCH RBC QN AUTO: 29.6 PG (ref 27–31)
MCHC RBC AUTO-ENTMCNC: 33.5 G/DL (ref 32–36)
MCV RBC AUTO: 88 FL (ref 82–98)
MONOCYTES # BLD AUTO: 0.8 K/UL (ref 0.3–1)
MONOCYTES NFR BLD: 6.2 % (ref 4–15)
NEUTROPHILS # BLD AUTO: 7.2 K/UL (ref 1.8–7.7)
NEUTROPHILS NFR BLD: 59.3 % (ref 38–73)
NRBC BLD-RTO: 0 /100 WBC
PLATELET # BLD AUTO: 330 K/UL (ref 150–350)
PMV BLD AUTO: 10.1 FL (ref 9.2–12.9)
POTASSIUM SERPL-SCNC: 3.9 MMOL/L (ref 3.5–5.1)
PROT SERPL-MCNC: 8.1 G/DL (ref 6–8.4)
RBC # BLD AUTO: 5.27 M/UL (ref 4.6–6.2)
SODIUM SERPL-SCNC: 138 MMOL/L (ref 136–145)
TROPONIN I SERPL DL<=0.01 NG/ML-MCNC: <0.006 NG/ML (ref 0–0.03)
TROPONIN I SERPL DL<=0.01 NG/ML-MCNC: <0.006 NG/ML (ref 0–0.03)
WBC # BLD AUTO: 12.05 K/UL (ref 3.9–12.7)

## 2020-12-21 PROCEDURE — 83880 ASSAY OF NATRIURETIC PEPTIDE: CPT

## 2020-12-21 PROCEDURE — 80053 COMPREHEN METABOLIC PANEL: CPT

## 2020-12-21 PROCEDURE — 93010 EKG 12-LEAD: ICD-10-PCS | Mod: ,,, | Performed by: INTERNAL MEDICINE

## 2020-12-21 PROCEDURE — 93010 ELECTROCARDIOGRAM REPORT: CPT | Mod: ,,, | Performed by: INTERNAL MEDICINE

## 2020-12-21 PROCEDURE — 93005 ELECTROCARDIOGRAM TRACING: CPT

## 2020-12-21 PROCEDURE — 99285 EMERGENCY DEPT VISIT HI MDM: CPT | Mod: 25

## 2020-12-21 PROCEDURE — 84484 ASSAY OF TROPONIN QUANT: CPT | Mod: 91

## 2020-12-21 PROCEDURE — 96374 THER/PROPH/DIAG INJ IV PUSH: CPT

## 2020-12-21 PROCEDURE — 85379 FIBRIN DEGRADATION QUANT: CPT

## 2020-12-21 PROCEDURE — 25000003 PHARM REV CODE 250: Performed by: NURSE PRACTITIONER

## 2020-12-21 PROCEDURE — 96375 TX/PRO/DX INJ NEW DRUG ADDON: CPT

## 2020-12-21 PROCEDURE — 85025 COMPLETE CBC W/AUTO DIFF WBC: CPT

## 2020-12-21 PROCEDURE — 63600175 PHARM REV CODE 636 W HCPCS: Performed by: EMERGENCY MEDICINE

## 2020-12-21 RX ORDER — ASPIRIN 325 MG
325 TABLET ORAL
Status: COMPLETED | OUTPATIENT
Start: 2020-12-21 | End: 2020-12-21

## 2020-12-21 RX ORDER — MORPHINE SULFATE 4 MG/ML
4 INJECTION, SOLUTION INTRAMUSCULAR; INTRAVENOUS
Status: COMPLETED | OUTPATIENT
Start: 2020-12-21 | End: 2020-12-21

## 2020-12-21 RX ORDER — KETOROLAC TROMETHAMINE 30 MG/ML
15 INJECTION, SOLUTION INTRAMUSCULAR; INTRAVENOUS
Status: COMPLETED | OUTPATIENT
Start: 2020-12-21 | End: 2020-12-21

## 2020-12-21 RX ORDER — IBUPROFEN 800 MG/1
800 TABLET ORAL EVERY 6 HOURS PRN
Qty: 20 TABLET | Refills: 0 | Status: SHIPPED | OUTPATIENT
Start: 2020-12-21

## 2020-12-21 RX ORDER — LIDOCAINE 50 MG/G
1 PATCH TOPICAL DAILY
Qty: 30 PATCH | Refills: 0 | Status: SHIPPED | OUTPATIENT
Start: 2020-12-21

## 2020-12-21 RX ORDER — LIDOCAINE 50 MG/G
1 PATCH TOPICAL
Status: DISCONTINUED | OUTPATIENT
Start: 2020-12-22 | End: 2020-12-22 | Stop reason: HOSPADM

## 2020-12-21 RX ADMIN — LIDOCAINE 1 PATCH: 50 PATCH TOPICAL at 11:12

## 2020-12-21 RX ADMIN — ASPIRIN 325 MG ORAL TABLET 325 MG: 325 PILL ORAL at 08:12

## 2020-12-21 RX ADMIN — MORPHINE SULFATE 4 MG: 4 INJECTION INTRAVENOUS at 11:12

## 2020-12-21 RX ADMIN — KETOROLAC TROMETHAMINE 15 MG: 30 INJECTION, SOLUTION INTRAMUSCULAR at 11:12

## 2020-12-21 NOTE — Clinical Note
"Ren Sandersonmert Grove was seen and treated in our emergency department on 12/21/2020.  He may return to work on 12/23/2020.       If you have any questions or concerns, please don't hesitate to call.      Mara Maloney MD"

## 2020-12-22 VITALS
RESPIRATION RATE: 16 BRPM | OXYGEN SATURATION: 99 % | HEART RATE: 85 BPM | BODY MASS INDEX: 32.58 KG/M2 | SYSTOLIC BLOOD PRESSURE: 143 MMHG | HEIGHT: 69 IN | WEIGHT: 220 LBS | TEMPERATURE: 98 F | DIASTOLIC BLOOD PRESSURE: 91 MMHG

## 2020-12-22 LAB — D DIMER PPP IA.FEU-MCNC: 0.42 MG/L FEU

## 2020-12-22 PROCEDURE — 25000003 PHARM REV CODE 250: Performed by: EMERGENCY MEDICINE

## 2020-12-22 NOTE — ED PROVIDER NOTES
Encounter Date: 12/21/2020    SCRIBE #1 NOTE: I, Kerrie Donahue, am scribing for, and in the presence of,  Mara Maloney MD. I have scribed the entire note.       History     Chief Complaint   Patient presents with    Chest Pain     c/o left chest pain since Saturday, worse since yesterday. Also c/o feeling lightheaded and of left arm pain.     Time seen by provider: 11:27 PM    This is a 40 y.o. male with PMHx of DVT, HTN, tachycardia, L BKA, and depression, who presents with complaint of worsening mid-sternal CP that began Saturday (12/19). He states it feels like he got beat up, and the pain is exacerbated with deep breath. His associated symptoms include a little SOB. The patient denies any other concerning symptoms. He has been compliant with his medications and denies any h/o blood clots in lungs or any recent illness.    The history is provided by the patient.     Review of patient's allergies indicates:  No Known Allergies  Past Medical History:   Diagnosis Date    Amputation of lower limb     left below the knee    Compartment syndrome of left lower extremity     Compartment syndrome of right lower extremity     Depression 3/18/2016    DVT (deep venous thrombosis)     Encounter for cholesteral screening for cardiovascular disease     Hypertension     Tachycardia      Past Surgical History:   Procedure Laterality Date    AMPUTATION, LOWER LIMB  2012    right BKA    ANGIOGRAPHY OF ABDOMEN N/A 2/24/2020    Procedure: Angiogram, Abdomen;  Surgeon: Sarah Russell MD;  [facility] CATH LAB[facility]EP;  Service: Cardiology;  Laterality: N/A;    ANGIOGRAPHY OF LOWER EXTREMITY Right 2/26/2020    Procedure: Angiogram Extremity Unilateral;  Surgeon: Pete Good MD;  Location: [facility] CATH LAB/EP;  Service: Cardiology;  Laterality: Right;    KNEE SURGERY      TIBIAL FASCIOTOMY Right 12/08/2015     Family History   Problem Relation Age of Onset    Hypertension Mother     Heart disease Father       Social History     Tobacco Use    Smoking status: Current Every Day Smoker     Packs/day: 1.00     Years: 32.00     Pack years: 32.00     Types: Cigarettes     Start date: 1988    Smokeless tobacco: Never Used    Tobacco comment: Handout provided for Ambulatory Smoking Cessation program.    Substance Use Topics    Alcohol use: Yes     Comment: occasional use    Drug use: No     Review of Systems   Respiratory: Positive for shortness of breath.    Cardiovascular: Positive for chest pain.   All other systems reviewed and are negative.      Physical Exam     Initial Vitals [12/21/20 1822]   BP Pulse Resp Temp SpO2   (!) 156/99 86 18 98.1 °F (36.7 °C) 96 %      MAP       --         Physical Exam    Nursing note and vitals reviewed.  Constitutional: He appears well-developed and well-nourished. He is not diaphoretic. No distress.   HENT:   Head: Normocephalic and atraumatic.   Mouth/Throat: Oropharynx is clear and moist.   Eyes: Conjunctivae and EOM are normal.   Neck: Normal range of motion. Neck supple.   Cardiovascular: Normal rate, regular rhythm and normal heart sounds. Exam reveals no gallop and no friction rub.    No murmur heard.  Pulmonary/Chest: Breath sounds normal. He has no wheezes. He has no rhonchi. He has no rales. He exhibits tenderness (reproducible L sided ).   Abdominal: Soft. There is no abdominal tenderness. There is no rebound and no guarding.   Musculoskeletal: Normal range of motion. No tenderness or edema.      Comments: L BKA   Lymphadenopathy:     He has no cervical adenopathy.   Neurological: He is alert and oriented to person, place, and time. He has normal strength.   Skin: Skin is warm and dry. No rash noted.         ED Course   Procedures  Labs Reviewed   CBC W/ AUTO DIFFERENTIAL - Abnormal; Notable for the following components:       Result Value    Immature Grans (Abs) 0.05 (*)     All other components within normal limits   COMPREHENSIVE METABOLIC PANEL - Abnormal; Notable for  the following components:    CO2 21 (*)     Alkaline Phosphatase 138 (*)     All other components within normal limits   TROPONIN I   B-TYPE NATRIURETIC PEPTIDE   TROPONIN I   D DIMER, QUANTITATIVE     EKG Readings: (Independently Interpreted)   EKG sinus rhythm 86 beats per minute,  no ischemic changes, no significant change from previous EKGs         Imaging Results          X-Ray Chest 1 View (Final result)  Result time 12/21/20 23:08:15    Final result by Víctor Zimmerman MD (12/21/20 23:08:15)                 Impression:      No convincing evidence of acute cardiopulmonary disease.      Electronically signed by: Víctor Zimmerman  Date:    12/21/2020  Time:    23:08             Narrative:    EXAMINATION:  XR CHEST 1 VIEW    CLINICAL HISTORY:  Other chest pain    TECHNIQUE:  Single frontal view of the chest was performed.    COMPARISON:  Chest radiograph performed 03/15/2016    FINDINGS:  EKG leads overlie the chest.  The cardiomediastinal contour appears unchanged.  Lungs appear essentially clear.  No pneumothorax or pleural effusion.  No acute findings are suggested in the visualized upper abdomen.  Osseous soft tissue structures without definite acute change.                                 Medical Decision Making:   History:   Old Medical Records: I decided to obtain old medical records.  Old Records Summarized: records from previous admission(s).       <> Summary of Records: 02/20 - PT presented to [facility] ED with RLE pain  Initial Assessment:   40-year-old male presents the ER for evaluation of left-sided chest pain.  Onset since Sunday.  Constant, pleuritic.  Reproducible.  Came to the ER for further evaluation.  No diaphoresis, no nausea vomiting, no shortness of breath with exertion.  Does have a history of arterial thrombosis, multiple DVTs, requiring below-knee amputation of right lower extremity.  Arrived in the ER, no acute distress.  Well-appearing.  Reproducible left-sided chest pain noted on  exam.  Differential includes pleurisy, costochondritis, NSTEMI, ACS, pneumonia, PE versus other cause.  Will obtain blood work, symptomatic control reassess.    Clinical Tests:   Lab Tests: Reviewed and Ordered  Radiological Study: Reviewed and Ordered  Medical Tests: Reviewed and Ordered                   ED Course as of Dec 22 0136   Mon Dec 21, 2020   2349 Resting in bed, no acute distress.  Patient reports he feels much better which to go home.  Discussed with patient diagnosis of pleurisy.  Discussed plan discharge home, return precautions, primary care follow-up.  Will give Lidoderm patch and Motrin.  Return precautions discussed.  Patient will be discharged.    [SE]      ED Course User Index  [SE] Mara Maloney MD            Clinical Impression:       ICD-10-CM ICD-9-CM   1. Pleurisy  R09.1 511.0   2. Chest pain  R07.9 786.50   3. Atypical chest pain  R07.89 786.59                          ED Disposition Condition    Discharge Stable        ED Prescriptions     Medication Sig Dispense Start Date End Date Auth. Provider    ibuprofen (ADVIL,MOTRIN) 800 MG tablet Take 1 tablet (800 mg total) by mouth every 6 (six) hours as needed. 20 tablet 12/21/2020  Mara Maloney MD    lidocaine (LIDODERM) 5 % Place 1 patch onto the skin once daily. Remove & Discard patch within 12 hours or as directed by MD 30 patch 12/21/2020  Mara Maloney MD        Follow-up Information     Follow up With Specialties Details Why Contact Info    Nomi Bermeo MD Internal Medicine Schedule an appointment as soon as possible for a visit  As needed [address] [address] [id number] [phone]                                      My Scribe Attestation: I acknowledge that the documentation on this chart was provided by described on the date of service noted above and that the documentation in the chart accurately reflects work and decisions made by me alone.         Mara Maloney MD  12/22/20 0136

## 2020-12-22 NOTE — FIRST PROVIDER EVALUATION
Emergency Department TeleTriage Encounter Note      CHIEF COMPLAINT    Chief Complaint   Patient presents with    Chest Pain     c/o left chest pain since Saturday, worse since yesterday. Also c/o feeling lightheaded and of left arm pain.       VITAL SIGNS   Initial Vitals [12/21/20 1822]   BP Pulse Resp Temp SpO2   (!) 156/99 86 18 98.1 °F (36.7 °C) 96 %      MAP       --            ALLERGIES    Review of patient's allergies indicates:  No Known Allergies    PROVIDER TRIAGE NOTE  39 y/o male which presents with chest pain, SOB and left arm pain that began yesterday and has worsened today. Patient is also having dizziness if he moves to fast.       ORDERS  Labs Reviewed - No data to display    ED Orders (720h ago, onward)    Start Ordered     Status Ordering Provider    12/21/20 2223 12/21/20 1922  Troponin I #2  Once Timed      Ordered LILLIANRODY    12/21/20 1930 12/21/20 1922  aspirin tablet 325 mg  ED 1 Time      Ordered LILLIANRODY    12/21/20 1923 12/21/20 1922  Vital signs  Every 15 min      Ordered LILLIANRODY    12/21/20 1923 12/21/20 1922  Cardiac Monitoring - Adult  Continuous     Comments: Notify Physician If:    Ordered LILLIANRODY    12/21/20 1923 12/21/20 1922  Pulse Oximetry Continuous  Continuous      Ordered LILLIANRODY    12/21/20 1923 12/21/20 1922  Diet NPO  Diet effective now      Ordered LILLIANRODY    12/21/20 1923 12/21/20 1922  Saline lock IV  Once      Ordered LILLIANRODY    12/21/20 1923 12/21/20 1922  CBC auto differential  STAT      Ordered LILLIANRODY    12/21/20 1923 12/21/20 1922  Comprehensive metabolic panel  STAT      Ordered LILLIANRODY    12/21/20 1923 12/21/20 1922  Troponin I #1  STAT      Ordered LILLIANRODY    12/21/20 1923 12/21/20 1922  BNP  STAT      Ordered LILLIANRODY    12/21/20 1850 12/21/20 1850  EKG 12-lead  Once  Completed    Completed by SIMONE DEAN on 12/21/2020 at  6:50 PM CARLA  LORE KIRBY III            Virtual Visit Note: The provider triage portion of this emergency department evaluation and documentation was performed via [employer], a HIPAA-compliant telemedicine application, in concert with a tele-presenter in the room. A face to face patient evaluation with one of my colleagues will occur once the patient is placed in an emergency department room.      DISCLAIMER: This note was prepared with [employer] voice recognition transcription software. Garbled syntax, mangled pronouns, and other bizarre constructions may be attributed to that software system.

## 2022-01-30 ENCOUNTER — HOSPITAL ENCOUNTER (EMERGENCY)
Facility: HOSPITAL | Age: 42
Discharge: HOME | End: 2022-01-31
Attending: EMERGENCY MEDICINE
Payer: MEDICARE

## 2022-01-30 DIAGNOSIS — S30.0XXA TRAUMATIC HEMATOMA OF LOWER BACK, INITIAL ENCOUNTER: ICD-10-CM

## 2022-01-30 DIAGNOSIS — M54.32 SCIATICA OF LEFT SIDE: Primary | ICD-10-CM

## 2022-01-30 DIAGNOSIS — S39.012A LUMBAR STRAIN, INITIAL ENCOUNTER: ICD-10-CM

## 2022-01-30 PROCEDURE — 99284 EMERGENCY DEPT VISIT MOD MDM: CPT | Mod: 25

## 2022-01-30 PROCEDURE — 96372 THER/PROPH/DIAG INJ SC/IM: CPT

## 2022-01-30 PROCEDURE — 99285 EMERGENCY DEPT VISIT HI MDM: CPT | Mod: ,,, | Performed by: EMERGENCY MEDICINE

## 2022-01-30 PROCEDURE — 99285 PR EMERGENCY DEPT VISIT,LEVEL V: ICD-10-PCS | Mod: ,,, | Performed by: EMERGENCY MEDICINE

## 2022-01-31 VITALS
WEIGHT: 220 LBS | RESPIRATION RATE: 18 BRPM | HEIGHT: 69 IN | DIASTOLIC BLOOD PRESSURE: 91 MMHG | HEART RATE: 118 BPM | SYSTOLIC BLOOD PRESSURE: 171 MMHG | OXYGEN SATURATION: 95 % | BODY MASS INDEX: 32.58 KG/M2 | TEMPERATURE: 98 F

## 2022-01-31 PROCEDURE — 63600175 PHARM REV CODE 636 W HCPCS: Performed by: EMERGENCY MEDICINE

## 2022-01-31 PROCEDURE — 25000003 PHARM REV CODE 250: Performed by: EMERGENCY MEDICINE

## 2022-01-31 RX ORDER — HYDROCODONE BITARTRATE AND ACETAMINOPHEN 5; 325 MG/1; MG/1
1 TABLET ORAL EVERY 4 HOURS PRN
Qty: 12 TABLET | Refills: 0 | Status: SHIPPED | OUTPATIENT
Start: 2022-01-31

## 2022-01-31 RX ORDER — METHOCARBAMOL 500 MG/1
1000 TABLET, FILM COATED ORAL
Status: COMPLETED | OUTPATIENT
Start: 2022-01-31 | End: 2022-01-31

## 2022-01-31 RX ORDER — METHOCARBAMOL 500 MG/1
1000 TABLET, FILM COATED ORAL 3 TIMES DAILY
Qty: 30 TABLET | Refills: 0 | Status: SHIPPED | OUTPATIENT
Start: 2022-01-31 | End: 2022-02-05

## 2022-01-31 RX ORDER — HYDROMORPHONE HYDROCHLORIDE 1 MG/ML
1 INJECTION, SOLUTION INTRAMUSCULAR; INTRAVENOUS; SUBCUTANEOUS
Status: COMPLETED | OUTPATIENT
Start: 2022-01-31 | End: 2022-01-31

## 2022-01-31 RX ORDER — DEXAMETHASONE SODIUM PHOSPHATE 4 MG/ML
8 INJECTION, SOLUTION INTRA-ARTICULAR; INTRALESIONAL; INTRAMUSCULAR; INTRAVENOUS; SOFT TISSUE
Status: COMPLETED | OUTPATIENT
Start: 2022-01-31 | End: 2022-01-31

## 2022-01-31 RX ADMIN — DEXAMETHASONE SODIUM PHOSPHATE 8 MG: 4 INJECTION INTRA-ARTICULAR; INTRALESIONAL; INTRAMUSCULAR; INTRAVENOUS; SOFT TISSUE at 12:01

## 2022-01-31 RX ADMIN — METHOCARBAMOL 1000 MG: 500 TABLET ORAL at 12:01

## 2022-01-31 RX ADMIN — HYDROMORPHONE HYDROCHLORIDE 1 MG: 1 INJECTION, SOLUTION INTRAMUSCULAR; INTRAVENOUS; SUBCUTANEOUS at 12:01

## 2022-01-31 NOTE — DISCHARGE INSTRUCTIONS
Can try heat heat for pain relief.  Prescription for Norco, Robaxin given.  Please only take when having severe pain otherwise can take Tylenol/ibuprofen.  Robaxin can make you drowsy, take with caution.  Do not drive while using these medications.  Follow-up with your back surgeon if started to experience worsening symptoms.  Return to ED immediately for bladder or bowel incontinence.  Weakness, loss of sensation of the lower extremity
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
## 2022-01-31 NOTE — ED TRIAGE NOTES
Ren INDIGO Grove, a 41 y.o. male presents to the ED w/ complaint of back pain that started after moving furniture earlier tonight. Pt has hx of back surgeries.     Triage note:  Chief Complaint   Patient presents with    Back Pain     Beginning tonight after moving furniture, lower back radiating down LLE     Review of patient's allergies indicates:  No Known Allergies  Past Medical History:   Diagnosis Date    Amputation of lower limb     left below the knee    Compartment syndrome of left lower extremity     Compartment syndrome of right lower extremity     Depression 3/18/2016    DVT (deep venous thrombosis)     Encounter for cholesteral screening for cardiovascular disease     Hypertension     Tachycardia

## 2022-02-14 NOTE — PROGRESS NOTES
"[facility] Hospital Medicine  Progress Note    Patient Name: Ren Grove  MRN: [id number]  Patient Class: IP- Inpatient   Admission Date: 2/20/2020  Length of Stay: 6 days  Attending Physician: Iglesia Gonzales DO  Primary Care Provider: Nomi Bermeo MD        Subjective:     Principal Problem:Critical lower limb ischemia        HPI:  Ren Grove is a 39 y.o. 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 He lives in [place], [place]. His primary care physician is Dr. Nomi Bermeo.   He had been hospitalized at [facility] a year prior (24/19 to 2/15/19) for right popliteal artery stenosis and was prescribed warfarin.    He presented to [facility] Emergency Department on 2/20/20 with acute pain, swelling, and redness of his right leg stump. His prosthesis was not fitting correctly. He noted a dusky appearance and coolness to touch. He reported that he had stopped taking warfarin, saying "I did not like them" and that his primary doctor and the person who made his prosthetic thought that aspirin was probably enough. Ultrasound suggested deep femoral artery stenosis and occlusion of the distal superficial femoral artery and popliteal artery. Interventional Cardiology was contacted and recommended loading clopidrogel, giving aspirin and heparin infusion. He was admitted to [facility].    Overview/Hospital Course:  Cardiology recommended continuing the aspirin, clopidrogel, and heparin. He underwent right lower extremity peripheral angiogram on 2/24/20 finding a 99% stenotic lesion of the proximal right superficial femoral " Case Management Assessment & Discharge Planning Note    Patient name Hilario Villa  Location /-64 MRN [id number]  : 1975 Date 2022       Current Admission Date: 2022  Current Admission Diagnosis:Right hemiparesis [facility])   Patient Active Problem List    Diagnosis Date Noted    Dysarthria 2022    Lower facial weakness 2022    CNS demyelinating disease ([facility] [address] ) 2022    Right hemiparesis ([facility] [address] ) 2022      LOS (days): 1  Geometric Mean LOS (GMLOS) (days):   Days to GMLOS:     OBJECTIVE:    Risk of Unplanned Readmission Score: 7     Current admission status: Inpatient    Preferred Pharmacy:   [facility] [address] [facility]  [facility], [place] - [address]  [address]  [place] [facility] [address]  Phone: [phone] Fax: [phone]    Primary Care Provider: Tito Boyd MD    Primary Insurance: [employer]  Secondary Insurance:     ASSESSMENT:  [address] [place] [address]   Primary Phone: [phone] (Home)               Advance Directives  Does patient have a [address] [facility] [address]?: No  Was patient offered paperwork?: Yes (Pt already has paperwork)  Does patient currently have a Health Care decision maker?: Yes, please see Health Care Proxy section  Does patient have Advance Directives?: No  Was patient offered paperwork?: Yes (Pt already has papework)  Primary Contact: Poonam Snyder: sister: [phone]    Readmission Root Cause  30 Day Readmission: No    Patient Information  Admitted from[de-identified] Home  Mental Status: Alert  During Assessment patient was accompanied by: Not accompanied during assessment  Assessment information provided by[de-identified] Patient  Primary Caregiver: Self  Support Systems: [address] of Residence: [facility] [place] entry access options   Select all that apply : Stairs  Number of steps to enter home : 3  Do the steps have railings?: Yes  Type of Current Residence: 2 story artery, 95% stenotic lesion of the mid to distal superficial femoral artery, a 100% stenotic lesion of the distal superficial femoral artery, and a 100% stenotic lesion of the proximal to mid popliteal artery. POD#1 Revascularization.     Interval History: Resting well, no distress noted. No complaints of pain noted. Will follow.     Review of Systems   Constitutional: Negative for chills and fever.   HENT: Negative for trouble swallowing.    Eyes: Negative for visual disturbance.   Respiratory: Negative for cough, shortness of breath and wheezing.    Cardiovascular: Negative for chest pain and palpitations.   Gastrointestinal: Negative for nausea and vomiting.   Genitourinary: Negative for difficulty urinating and hematuria.   Musculoskeletal: Positive for gait problem. Negative for myalgias.   Skin: Positive for color change and wound (left groin).   Neurological: Negative for dizziness, light-headedness and numbness.   Hematological: Does not bruise/bleed easily.   Psychiatric/Behavioral: Negative for agitation, confusion and hallucinations.     Objective:     Vital Signs (Most Recent):  Temp: 97 °F (36.1 °C) (02/27/20 0834)  Pulse: (!) 111 (02/27/20 0834)  Resp: 16 (02/27/20 0834)  BP: 137/72 (02/27/20 0834)  SpO2: 97 % (02/27/20 0834) Vital Signs (24h Range):  Temp:  [96.4 °F (35.8 °C)-98.3 °F (36.8 °C)] 97 °F (36.1 °C)  Pulse:  [] 111  Resp:  [11-20] 16  SpO2:  [94 %-97 %] 97 %  BP: (124-158)/(72-92) 137/72     Weight: 94.8 kg (209 lb)  Body mass index is 30.86 kg/m².    Intake/Output Summary (Last 24 hours) at 2/27/2020 0946  Last data filed at 2/27/2020 0101  Gross per 24 hour   Intake 250 ml   Output --   Net 250 ml      Physical Exam   Constitutional: He is oriented to person, place, and time. He appears well-developed. No distress.   HENT:   Head: Normocephalic and atraumatic.   Eyes: Pupils are equal, round, and reactive to light.   Neck: Normal range of motion. No JVD present.   Cardiovascular:  home  Upon entering residence, is there a bedroom on the main floor (no further steps)?: No  A bedroom is located on the following floor levels of residence (select all that apply):: 2nd Floor  Upon entering residence, is there a bathroom on the main floor (no further steps)?: Yes  Number of steps to 2nd floor from main floor: One Flight  In the last 12 months, was there a time when you were not able to pay the mortgage or rent on time?: No  In the last 12 months, how many places have you lived?: 1  In the last 12 months, was there a time when you did not have a steady place to sleep or slept in a shelter (including now)?: No  Homeless/housing insecurity resource given?: N/A  Living Arrangements: Lives w/ Parent(s),Lives w/ Daughter    Activities of Daily Living Prior to Admission  Functional Status: Independent  Completes ADLs independently?: Yes  Ambulates independently?: Yes (uses walker)  Does patient use assisted devices?: Yes  Assisted Devices (DME) used: Mary Witt  Does patient currently own DME?: Yes  What DME does the patient currently own?: Delfina Henry (Comment) (transport chair)  Does patient have a history of Outpatient Therapy (PT/OT)?: No  Does the patient have a history of Short-Term Rehab?: No  Does patient have a history of HHC?: Yes ([address])  Does patient currently have [place] ?: Yes    Current Home Health Care  Type of Current Home Care Services: Home OT,Nurse visit  [address] [place] [address][de-identified] Other (please enter name in comment) ([facility])  [facility] Provider[de-identified] PCP    Patient Information Continued  Income Source: Unemployed  Does patient have prescription coverage?: Yes  Within the past 12 months, you worried that your food would run out before you got the money to buy more : Never true  Within the past 12 months, the food you bought just didnt last and you didnt have money to get more : Never true  Food insecurity resource given?: N/A  Does patient Normal rate and intact distal pulses.   Pulmonary/Chest: Effort normal. No respiratory distress.   Abdominal: Soft. Bowel sounds are normal. He exhibits no distension. There is no tenderness. There is no guarding.   Musculoskeletal: He exhibits tenderness and deformity (right BKA).   Neurological: He is alert and oriented to person, place, and time.   Skin: Capillary refill takes 2 to 3 seconds. There is erythema (Right lower ext).   Left groin is soft to palpate. No drainage noted and no hematoma   Psychiatric: He has a normal mood and affect. His behavior is normal.   Nursing note and vitals reviewed.      Significant Labs: All pertinent labs within the past 24 hours have been reviewed.    Significant Imaging: I have reviewed all pertinent imaging results/findings within the past 24 hours.       Assessment/Plan:      * Critical lower limb ischemia  Antiphospholipid antibody positive  History of right below knee amputation  Appreciate Cardiology. The heparin gtt has been stopped. Will cont with  aspirin 81 mg daily, clopidogrel 75 mg daily, atorvastatin to 80 mg daily. He is POD#1 revascularization. Will cont statin and will add rivaroxaban 20 mg with dinner.    Essential hypertension  Hyperlipidemia   Continue home aspirin, amlodipine, lisinopril-hydrochlorothiazide, atorvastatin.       VTE Risk Mitigation (From admission, onward)         Ordered     rivaroxaban tablet 20 mg  With dinner      02/26/20 1159     heparin infusion 1,000 units/500 ml in 0.9% NaCl (pressure line flush)  Intra-op continuous PRN      02/24/20 0949     IP VTE HIGH RISK PATIENT  Once      02/21/20 0233     Reason for no Mechanical VTE Prophylaxis  Once     Question:  Reasons:  Answer:  Physician Provided (leave comment)  Comment:  on heparin gtt     02/21/20 0233                      Keke Bates NP  Department of Hospital Medicine   [facility]   receive dialysis treatments?: No  Does patient have a history of substance abuse?: No  Does patient have a history of Mental Health Diagnosis?: No      Means of Transportation  Means of Transport to Appts[de-identified] Family transport  In the past 12 months, has lack of transportation kept you from medical appointments or from getting medications?: No  In the past 12 months, has lack of transportation kept you from meetings, work, or from getting things needed for daily living?: No  Was application for public transport provided?: N/A    DISCHARGE DETAILS:    [address]         [facility] the [facility] at [facility]?: Yes  [address] requested[de-identified] [address] Name[de-identified] Other  [facility] Sal Provider[de-identified] PCP  Home Health Services Needed[de-identified] Evaluate Functional Status and Safety,Gait/ADL Training,Strengthening/Theraputic Exercises to Improve Function  Homebound Criteria Met[de-identified] Requires the Assistance of Another Person for Safe Ambulation or to Leave the Home,Uses an Assist Device (i e  cane, walker, etc)  Supporting Clincal Findings[de-identified] Limited Endurance    Additional Comments: Lives with parents, 23 yo dtr in [address] [place] [address], 3 pritesh with railings  Uses walker  Sister provides transportation  Has [facility] for OT/SN  Referral sent via [facility]   Sister to transport home

## 2022-04-12 ENCOUNTER — TELEPHONE (OUTPATIENT)
Dept: GENETICS | Facility: CLINIC | Age: 42
End: 2022-04-12

## 2024-05-11 ENCOUNTER — HOSPITAL ENCOUNTER (EMERGENCY)
Facility: HOSPITAL | Age: 44
Discharge: HOME | End: 2024-05-11
Attending: EMERGENCY MEDICINE
Payer: MEDICARE

## 2024-05-11 VITALS
BODY MASS INDEX: 31.84 KG/M2 | HEIGHT: 69 IN | RESPIRATION RATE: 18 BRPM | HEART RATE: 93 BPM | SYSTOLIC BLOOD PRESSURE: 149 MMHG | WEIGHT: 215 LBS | OXYGEN SATURATION: 97 % | TEMPERATURE: 98 F | DIASTOLIC BLOOD PRESSURE: 87 MMHG

## 2024-05-11 DIAGNOSIS — S00.33XA CONTUSION OF NOSE, INITIAL ENCOUNTER: Primary | ICD-10-CM
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
## 2024-05-11 PROCEDURE — 99283 EMERGENCY DEPT VISIT LOW MDM: CPT | Mod: 25,ER

## 2024-05-11 PROCEDURE — 25000003 PHARM REV CODE 250: Mod: ER | Performed by: EMERGENCY MEDICINE

## 2024-05-11 RX ORDER — KETOROLAC TROMETHAMINE 10 MG/1
10 TABLET, FILM COATED ORAL
Status: COMPLETED | OUTPATIENT
Start: 2024-05-11 | End: 2024-05-11

## 2024-05-11 RX ORDER — KETOROLAC TROMETHAMINE 10 MG/1
10 TABLET, FILM COATED ORAL EVERY 8 HOURS PRN
Qty: 15 TABLET | Refills: 0 | Status: SHIPPED | OUTPATIENT
Start: 2024-05-11

## 2024-05-11 RX ADMIN — KETOROLAC TROMETHAMINE 10 MG: 10 TABLET, FILM COATED ORAL at 07:05
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
## (undated) DEVICE — KIT LEFT HEART MANIFOLD CUSTOM

## (undated) DEVICE — CATH LUTONIX DCB 018X130X4X150

## (undated) DEVICE — INFLATOR ENCORE 26 BLLN INFL

## (undated) DEVICE — SEE MEDLINE ITEM 157187

## (undated) DEVICE — CATH ULTRAVERSE 014 4X300X150

## (undated) DEVICE — HEMOSTAT VASC BAND REG 24CM

## (undated) DEVICE — CATH STERLING OTW 6X220X150

## (undated) DEVICE — GUIDEWIRE FIELDER XT.014X300CM

## (undated) DEVICE — SET MICRO PUNCT 4FR/MPIS-401

## (undated) DEVICE — GUIDEWIRE ADVNTG 035X260CM ANG

## (undated) DEVICE — KIT INTRODUCER W/GUIDEWIRE

## (undated) DEVICE — CATH EAGLE EYE ST .014X20X150

## (undated) DEVICE — CATH IMPULSE 5FR PIGTAIL 125CM

## (undated) DEVICE — CATH LUTONIX DCB 018X130X6X220

## (undated) DEVICE — SHEATH DESTINATION 6F X 65CM

## (undated) DEVICE — SHEATH INTRODUCER 6FR 11CM

## (undated) DEVICE — CATH STERLING MR 6X60X135

## (undated) DEVICE — TAPE RADIOPLAQUE VIPER TRAC

## (undated) DEVICE — VISE RADIFOCUS MULTI TORQUE

## (undated) DEVICE — CATH CXI ANG 2.6F .018IN 150CM

## (undated) DEVICE — CATH LUTONIX DCB 018X130X6X100

## (undated) DEVICE — CATH IMA INFINITI 4FRX100CM

## (undated) DEVICE — CATH GLIDECATH PV MLTCRV 4FR

## (undated) DEVICE — GUIDEWIRE EMERALD .035IN 260CM

## (undated) DEVICE — DEVICE PERCLOSE SUT CLSR 6FR

## (undated) DEVICE — GUIDEWIRE EMERALD 150CM PTFE

## (undated) DEVICE — PAD DEFIB CADENCE ADULT R2

## (undated) DEVICE — CONTRAST VISIPAQUE 150ML

## (undated) DEVICE — Device

## (undated) DEVICE — COVER PROBE US 5.5X58L NON LTX

## (undated) DEVICE — GLIDESHEATH SLENDER SS 5FR10CM

## (undated) DEVICE — PADS RADI PERIPHERAL SHIELD